# Patient Record
Sex: MALE | Race: WHITE | NOT HISPANIC OR LATINO | URBAN - METROPOLITAN AREA
[De-identification: names, ages, dates, MRNs, and addresses within clinical notes are randomized per-mention and may not be internally consistent; named-entity substitution may affect disease eponyms.]

---

## 2017-10-05 ENCOUNTER — EMERGENCY (EMERGENCY)
Facility: HOSPITAL | Age: 72
LOS: 1 days | Discharge: HOME | End: 2017-10-05
Attending: EMERGENCY MEDICINE | Admitting: EMERGENCY MEDICINE
Payer: MEDICARE

## 2017-10-05 ENCOUNTER — INPATIENT (INPATIENT)
Facility: HOSPITAL | Age: 72
LOS: 1 days | Discharge: HOME | DRG: 982 | End: 2017-10-07
Attending: OTOLARYNGOLOGY | Admitting: OTOLARYNGOLOGY
Payer: MEDICARE

## 2017-10-05 VITALS
HEART RATE: 90 BPM | WEIGHT: 190.04 LBS | DIASTOLIC BLOOD PRESSURE: 100 MMHG | HEIGHT: 71 IN | SYSTOLIC BLOOD PRESSURE: 182 MMHG | RESPIRATION RATE: 18 BRPM | TEMPERATURE: 97 F | OXYGEN SATURATION: 97 %

## 2017-10-05 VITALS
SYSTOLIC BLOOD PRESSURE: 149 MMHG | HEART RATE: 84 BPM | RESPIRATION RATE: 18 BRPM | OXYGEN SATURATION: 100 % | TEMPERATURE: 99 F | DIASTOLIC BLOOD PRESSURE: 85 MMHG

## 2017-10-05 VITALS
HEART RATE: 76 BPM | SYSTOLIC BLOOD PRESSURE: 146 MMHG | DIASTOLIC BLOOD PRESSURE: 94 MMHG | TEMPERATURE: 98 F | RESPIRATION RATE: 18 BRPM | OXYGEN SATURATION: 100 %

## 2017-10-05 LAB
ALBUMIN SERPL ELPH-MCNC: 4 G/DL — SIGNIFICANT CHANGE UP (ref 3.3–5)
ALP SERPL-CCNC: 62 U/L — SIGNIFICANT CHANGE UP (ref 40–120)
ALT FLD-CCNC: 18 U/L — SIGNIFICANT CHANGE UP (ref 10–45)
ANION GAP SERPL CALC-SCNC: 15 MMOL/L — SIGNIFICANT CHANGE UP (ref 5–17)
AST SERPL-CCNC: 25 U/L — SIGNIFICANT CHANGE UP (ref 10–40)
BASOPHILS NFR BLD AUTO: 0.1 % — SIGNIFICANT CHANGE UP (ref 0–2)
BILIRUB SERPL-MCNC: 1.3 MG/DL — HIGH (ref 0.2–1.2)
BUN SERPL-MCNC: 16 MG/DL — SIGNIFICANT CHANGE UP (ref 7–23)
CALCIUM SERPL-MCNC: 9.6 MG/DL — SIGNIFICANT CHANGE UP (ref 8.4–10.5)
CHLORIDE SERPL-SCNC: 102 MMOL/L — SIGNIFICANT CHANGE UP (ref 96–108)
CO2 SERPL-SCNC: 23 MMOL/L — SIGNIFICANT CHANGE UP (ref 22–31)
CREAT SERPL-MCNC: 1.01 MG/DL — SIGNIFICANT CHANGE UP (ref 0.5–1.3)
EOSINOPHIL NFR BLD AUTO: 0.4 % — SIGNIFICANT CHANGE UP (ref 0–6)
GLUCOSE SERPL-MCNC: 135 MG/DL — HIGH (ref 70–99)
HCT VFR BLD CALC: 42 % — SIGNIFICANT CHANGE UP (ref 39–50)
HGB BLD-MCNC: 14.8 G/DL — SIGNIFICANT CHANGE UP (ref 13–17)
LYMPHOCYTES # BLD AUTO: 18 % — SIGNIFICANT CHANGE UP (ref 13–44)
MCHC RBC-ENTMCNC: 30.4 PG — SIGNIFICANT CHANGE UP (ref 27–34)
MCHC RBC-ENTMCNC: 35.2 G/DL — SIGNIFICANT CHANGE UP (ref 32–36)
MCV RBC AUTO: 86.2 FL — SIGNIFICANT CHANGE UP (ref 80–100)
MONOCYTES NFR BLD AUTO: 6.5 % — SIGNIFICANT CHANGE UP (ref 2–14)
NEUTROPHILS NFR BLD AUTO: 75 % — SIGNIFICANT CHANGE UP (ref 43–77)
PLATELET # BLD AUTO: 207 K/UL — SIGNIFICANT CHANGE UP (ref 150–400)
POTASSIUM SERPL-MCNC: 4.2 MMOL/L — SIGNIFICANT CHANGE UP (ref 3.5–5.3)
POTASSIUM SERPL-SCNC: 4.2 MMOL/L — SIGNIFICANT CHANGE UP (ref 3.5–5.3)
PROT SERPL-MCNC: 7.7 G/DL — SIGNIFICANT CHANGE UP (ref 6–8.3)
RBC # BLD: 4.87 M/UL — SIGNIFICANT CHANGE UP (ref 4.2–5.8)
RBC # FLD: 13.5 % — SIGNIFICANT CHANGE UP (ref 10.3–16.9)
SODIUM SERPL-SCNC: 140 MMOL/L — SIGNIFICANT CHANGE UP (ref 135–145)
WBC # BLD: 10.5 K/UL — SIGNIFICANT CHANGE UP (ref 3.8–10.5)
WBC # FLD AUTO: 10.5 K/UL — SIGNIFICANT CHANGE UP (ref 3.8–10.5)

## 2017-10-05 PROCEDURE — 99284 EMERGENCY DEPT VISIT MOD MDM: CPT | Mod: 25

## 2017-10-05 PROCEDURE — 30903 CONTROL OF NOSEBLEED: CPT | Mod: 50

## 2017-10-05 PROCEDURE — 99285 EMERGENCY DEPT VISIT HI MDM: CPT

## 2017-10-05 RX ORDER — MORPHINE SULFATE 50 MG/1
2 CAPSULE, EXTENDED RELEASE ORAL ONCE
Qty: 0 | Refills: 0 | Status: DISCONTINUED | OUTPATIENT
Start: 2017-10-05 | End: 2017-10-05

## 2017-10-05 RX ORDER — LOSARTAN POTASSIUM 100 MG/1
50 TABLET, FILM COATED ORAL ONCE
Qty: 0 | Refills: 0 | Status: COMPLETED | OUTPATIENT
Start: 2017-10-05 | End: 2017-10-05

## 2017-10-05 RX ORDER — METOPROLOL TARTRATE 50 MG
25 TABLET ORAL ONCE
Qty: 0 | Refills: 0 | Status: COMPLETED | OUTPATIENT
Start: 2017-10-05 | End: 2017-10-05

## 2017-10-05 RX ADMIN — MORPHINE SULFATE 2 MILLIGRAM(S): 50 CAPSULE, EXTENDED RELEASE ORAL at 21:48

## 2017-10-05 RX ADMIN — Medication 0.5 MILLIGRAM(S): at 14:21

## 2017-10-05 RX ADMIN — LOSARTAN POTASSIUM 50 MILLIGRAM(S): 100 TABLET, FILM COATED ORAL at 07:32

## 2017-10-05 RX ADMIN — Medication 25 MILLIGRAM(S): at 07:32

## 2017-10-05 NOTE — ED PROVIDER NOTE - ENMT, MLM
Airway patent, Nasal mucosa clear. Mouth with normal mucosa.  pos epistaxis  - mild trickling of blood noted--Throat has no vesicles, no oropharyngeal exudates and uvula is midline.

## 2017-10-05 NOTE — ED ADULT NURSE NOTE - CHPI ED SYMPTOMS NEG
no chills/no blurred vision/no weakness/no numbness/no change in level of consciousness/no vomiting/no loss of consciousness/no syncope/no fever/no nausea

## 2017-10-05 NOTE — ED ADULT NURSE REASSESSMENT NOTE - NS ED NURSE REASSESS COMMENT FT1
Received pt from Night CHARITO Cochran, patient stable, in NAD, ENT at bedside.  Will continue with plan of care.

## 2017-10-05 NOTE — ED PROVIDER NOTE - MEDICAL DECISION MAKING DETAILS
70 y/o m epistaxis right nostril; ENT called after blood in posterior pharynx and oozing post packing in ED, bleeding controlled, pt stable for d/c to f/u with ENT

## 2017-10-05 NOTE — ED ADULT NURSE NOTE - OBJECTIVE STATEMENT
71 yr old male presents to the ed with c.o intermittent epistaxis since yesterday morning. states he has hx of epistatxis since 2008. denies any headache, pain, injury, difficulty in breathing, fever/chills or any other medical complaints. pt tx by anthony Rueda in triage area, packing placed in nostrils.

## 2017-10-05 NOTE — ED ADULT TRIAGE NOTE - CHIEF COMPLAINT QUOTE
nose bleeding since yesterday three times  hx of previous epistaxis in 2008, HTN. pt takes aspirin 81 mg, denies H/A, dizziness, no N/V

## 2017-10-05 NOTE — CONSULT NOTE ADULT - SUBJECTIVE AND OBJECTIVE BOX
HPI: 71y male with h/o HTN/HLD and prior epistaxis presenting with intermittent epistaxis x 1 day. He reports a history of frequent epistaxis many years ago for which he was cauterized in the office on multiple occasions resulting in some nasal deformity. He underwent skin grafting of the septum at [facility] in 2001 after which he did well. He then had an episode of severe epistaxis in 2008 and underwent embolization. He has not had any episodes since that time. Yesterday morning he was in [place] and had a mild nosebleed in the morning. He applied ice and held pressure with resolution. He flew home yesterday, used Afrin prior to getting on the plane and had no issues. Then woke up around 3am with significant bleeding and presented to the ER. On arrival BP was 190/100, came down to 150s with medication. Denies trouble breathing, not coughing or swallowing blood, spitting up some clots. Surgicel placed in bilateral nasal cavities by ER prior to consultation.     Allergies    Vibramycin (Hives)    MEDICATIONS:  Antiinfectives: none      Hematologic/Anticoagulation: Aspirin 81mg      Pain medications/Neuro: none      IV fluids: none      Endocrine Medications: none      All other standing medications:       All other PRN medications:      Vital Signs Last 24 Hrs  T(C): 37 (05 Oct 2017 13:29), Max: 37 (05 Oct 2017 13:29)  T(F): 98.6 (05 Oct 2017 13:29), Max: 98.6 (05 Oct 2017 13:29)  HR: 84 (05 Oct 2017 13:29) (73 - 90)  BP: 149/85 (05 Oct 2017 13:29) (135/81 - 182/100)  BP(mean): --  RR: 18 (05 Oct 2017 13:29) (17 - 18)  SpO2: 100% (05 Oct 2017 13:29) (96% - 100%)    LABS:  CBC-    10-05    140  |  102  |  16  ----------------------------<  135<H>  4.2   |  23  |  1.01    Ca    9.6      05 Oct 2017 07:29    TPro  7.7  /  Alb  4.0  /  TBili  1.3<H>  /  DBili  x   /  AST  25  /  ALT  18  /  AlkPhos  62  10-05    Coagulation Studies-  PT/INR - ( 05 Oct 2017 07:47 )   PT: 12.0 sec;   INR: 1.08          PTT - ( 05 Oct 2017 07:47 )  PTT:30.3 sec  Endocrine Panel-  --  --  9.6 mg/dL        PHYSICAL EXAM:    ENT EXAM-   NAD, AAOx3  Breathing comfortably on room air   Surgicel packing removed from bilateral nasal cavities   Large clot suctioned from nasopharynx   Afrin soaked pledgets placed in the nose  No active bleeding seen     On repeat exam patient with active bleeding from R side  Scope exam showed source of bleeding from posterior middle turbinate on the right, cauterized with silver nitrate   Fibrillar placed between middle turbinate and septum and along floor of nasal cavity    Upon leaving the hospital mild dripping noted from the nose.   Re-examined, no active bleeding, some drops of residual blood coming out on tissue   Surgiflo placed in bilateral nasal cavities   No further bleeding        Assessment/Plan:  71y Male with epistaxis s/p cauterization of R posterior middle turbinate and placement of absorbable packing and surgiflo.   - Instructed to avoid nose blowing, bearing down, or any strenuous activity   - If bleeding at home spray afrin and hold pressure, if doesn't stop come back to ER   - Follow up on Tuesday with Dr. Louis, contact information given to patient   - Sleep with humidifier, apply bacitracin to inside of nares twice daily   - Discussed with attending Dr. Isael Reeves ENT at [phone] with any questions/concerns.

## 2017-10-05 NOTE — ED PROVIDER NOTE - PROGRESS NOTE DETAILS
Pt seen by ent and r nare cauterized with good hemostasis, no further bleeding. Pt cleared for dc to home w/ outpt f/u. Pt requesting med for anxiety- will give low dose ativan and pt to f/u with his pcp/ psych walk in clinic at [facility] for re-evaluation.

## 2017-10-05 NOTE — ED PROVIDER NOTE - ATTENDING CONTRIBUTION TO CARE
Pt is a 72yo m, h/o htn, skin graft to r nare 2/2 epistaxis in '00, embolization to r nare in '08, who p/w intermittent atraumatic r sided epistaxis starting last night, now with bleeding from b/l nares r > l, not relieved w/ ice. + blood to back of throat. No cp, sob. Pt is not on ac. Afebrile. Hypertensive on arrival, improved with oral bp meds in ed. Packing w/ surgicel placed to b/l nares by PA. WNWD m in nad. + slight ooze from nares despite packing. + blood to posterior pharynx. + s1, s2, rrr. Lungs cta b/l. Labs reviewed w/ findings of stable hg/hct and plts. Nasal tong applied for compression. ENT consult requested for evaluation for possible posterior epistaxis.

## 2017-10-05 NOTE — ED ADULT TRIAGE NOTE - CHIEF COMPLAINT QUOTE
Patient status post repeating nose bleed.  Was here earlier for same symptoms. Denies difficulty breathing.

## 2017-10-06 LAB
HCT VFR BLD CALC: 37.2 % — LOW (ref 39–50)
HGB BLD-MCNC: 12.8 G/DL — LOW (ref 13–17)
MCHC RBC-ENTMCNC: 29.8 PG — SIGNIFICANT CHANGE UP (ref 27–34)
MCHC RBC-ENTMCNC: 34.4 G/DL — SIGNIFICANT CHANGE UP (ref 32–36)
MCV RBC AUTO: 86.7 FL — SIGNIFICANT CHANGE UP (ref 80–100)
PLATELET # BLD AUTO: 205 K/UL — SIGNIFICANT CHANGE UP (ref 150–400)
RBC # BLD: 4.29 M/UL — SIGNIFICANT CHANGE UP (ref 4.2–5.8)
RBC # FLD: 13.5 % — SIGNIFICANT CHANGE UP (ref 10.3–16.9)
WBC # BLD: 13.4 K/UL — HIGH (ref 3.8–10.5)
WBC # FLD AUTO: 13.4 K/UL — HIGH (ref 3.8–10.5)

## 2017-10-06 PROCEDURE — 75894 X-RAYS TRANSCATH THERAPY: CPT | Mod: 26

## 2017-10-06 PROCEDURE — 61626 TCAT PERM OCCLS/EMBOL NONCNS: CPT

## 2017-10-06 PROCEDURE — 36227 PLACE CATH XTRNL CAROTID: CPT | Mod: RT

## 2017-10-06 PROCEDURE — 36223 PLACE CATH CAROTID/INOM ART: CPT | Mod: RT

## 2017-10-06 PROCEDURE — 75898 FOLLOW-UP ANGIOGRAPHY: CPT | Mod: 26

## 2017-10-06 RX ORDER — METOPROLOL TARTRATE 50 MG
25 TABLET ORAL DAILY
Qty: 0 | Refills: 0 | Status: DISCONTINUED | OUTPATIENT
Start: 2017-10-06 | End: 2017-10-07

## 2017-10-06 RX ORDER — LABETALOL HCL 100 MG
100 TABLET ORAL EVERY 4 HOURS
Qty: 0 | Refills: 0 | Status: DISCONTINUED | OUTPATIENT
Start: 2017-10-06 | End: 2017-10-07

## 2017-10-06 RX ORDER — ONDANSETRON 8 MG/1
4 TABLET, FILM COATED ORAL EVERY 6 HOURS
Qty: 0 | Refills: 0 | Status: DISCONTINUED | OUTPATIENT
Start: 2017-10-06 | End: 2017-10-07

## 2017-10-06 RX ORDER — CEFAZOLIN SODIUM 1 G
1000 VIAL (EA) INJECTION ONCE
Qty: 0 | Refills: 0 | Status: COMPLETED | OUTPATIENT
Start: 2017-10-06 | End: 2017-10-06

## 2017-10-06 RX ORDER — INFLUENZA VIRUS VACCINE 15; 15; 15; 15 UG/.5ML; UG/.5ML; UG/.5ML; UG/.5ML
0.5 SUSPENSION INTRAMUSCULAR ONCE
Qty: 0 | Refills: 0 | Status: DISCONTINUED | OUTPATIENT
Start: 2017-10-06 | End: 2017-10-06

## 2017-10-06 RX ORDER — CEFAZOLIN SODIUM 1 G
VIAL (EA) INJECTION
Qty: 0 | Refills: 0 | Status: DISCONTINUED | OUTPATIENT
Start: 2017-10-06 | End: 2017-10-07

## 2017-10-06 RX ORDER — CEFAZOLIN SODIUM 1 G
1000 VIAL (EA) INJECTION EVERY 8 HOURS
Qty: 0 | Refills: 0 | Status: DISCONTINUED | OUTPATIENT
Start: 2017-10-06 | End: 2017-10-07

## 2017-10-06 RX ORDER — OXYCODONE AND ACETAMINOPHEN 5; 325 MG/1; MG/1
1 TABLET ORAL EVERY 6 HOURS
Qty: 0 | Refills: 0 | Status: DISCONTINUED | OUTPATIENT
Start: 2017-10-06 | End: 2017-10-07

## 2017-10-06 RX ORDER — ACETAMINOPHEN 500 MG
650 TABLET ORAL EVERY 6 HOURS
Qty: 0 | Refills: 0 | Status: DISCONTINUED | OUTPATIENT
Start: 2017-10-06 | End: 2017-10-07

## 2017-10-06 RX ORDER — DEXTROSE MONOHYDRATE, SODIUM CHLORIDE, AND POTASSIUM CHLORIDE 50; .745; 4.5 G/1000ML; G/1000ML; G/1000ML
1000 INJECTION, SOLUTION INTRAVENOUS
Qty: 0 | Refills: 0 | Status: DISCONTINUED | OUTPATIENT
Start: 2017-10-06 | End: 2017-10-06

## 2017-10-06 RX ORDER — SODIUM CHLORIDE 9 MG/ML
1000 INJECTION INTRAMUSCULAR; INTRAVENOUS; SUBCUTANEOUS
Qty: 0 | Refills: 0 | Status: DISCONTINUED | OUTPATIENT
Start: 2017-10-06 | End: 2017-10-07

## 2017-10-06 RX ORDER — LOSARTAN POTASSIUM 100 MG/1
50 TABLET, FILM COATED ORAL DAILY
Qty: 0 | Refills: 0 | Status: DISCONTINUED | OUTPATIENT
Start: 2017-10-06 | End: 2017-10-07

## 2017-10-06 RX ORDER — INFLUENZA VIRUS VACCINE 15; 15; 15; 15 UG/.5ML; UG/.5ML; UG/.5ML; UG/.5ML
0.5 SUSPENSION INTRAMUSCULAR ONCE
Qty: 0 | Refills: 0 | Status: DISCONTINUED | OUTPATIENT
Start: 2017-10-06 | End: 2017-10-07

## 2017-10-06 RX ORDER — MORPHINE SULFATE 50 MG/1
2 CAPSULE, EXTENDED RELEASE ORAL EVERY 4 HOURS
Qty: 0 | Refills: 0 | Status: DISCONTINUED | OUTPATIENT
Start: 2017-10-06 | End: 2017-10-07

## 2017-10-06 RX ADMIN — OXYCODONE AND ACETAMINOPHEN 1 TABLET(S): 5; 325 TABLET ORAL at 02:00

## 2017-10-06 RX ADMIN — DEXTROSE MONOHYDRATE, SODIUM CHLORIDE, AND POTASSIUM CHLORIDE 75 MILLILITER(S): 50; .745; 4.5 INJECTION, SOLUTION INTRAVENOUS at 01:09

## 2017-10-06 RX ADMIN — Medication 650 MILLIGRAM(S): at 04:01

## 2017-10-06 RX ADMIN — Medication 1 MILLIGRAM(S): at 23:00

## 2017-10-06 RX ADMIN — Medication 25 MILLIGRAM(S): at 06:41

## 2017-10-06 RX ADMIN — LOSARTAN POTASSIUM 50 MILLIGRAM(S): 100 TABLET, FILM COATED ORAL at 06:41

## 2017-10-06 RX ADMIN — Medication 0.5 MILLIGRAM(S): at 11:23

## 2017-10-06 RX ADMIN — OXYCODONE AND ACETAMINOPHEN 1 TABLET(S): 5; 325 TABLET ORAL at 01:09

## 2017-10-06 RX ADMIN — Medication 100 MILLIGRAM(S): at 12:07

## 2017-10-06 RX ADMIN — DEXTROSE MONOHYDRATE, SODIUM CHLORIDE, AND POTASSIUM CHLORIDE 75 MILLILITER(S): 50; .745; 4.5 INJECTION, SOLUTION INTRAVENOUS at 22:21

## 2017-10-06 NOTE — H&P ADULT - ASSESSMENT
71M with right-sided epistaxis refractory to cautery in ED requiring nasal packing. Given several repeat bleeds and poor accibility to area of bleeding without operative intervention, will observe in hospital for further bleeding and also control BP.   -NPO w/ meds  -BP control  -IV fluids  -will restart home BP meds in AM  -nasal precautions

## 2017-10-06 NOTE — H&P ADULT - HISTORY OF PRESENT ILLNESS
HPI: 71y male with h/o HTN/HLD and prior epistaxis presenting with intermittent epistaxis x 1 day. He reports a history of frequent epistaxis many years ago for which he was cauterized in the office on multiple occasions resulting in some nasal deformity. He underwent skin grafting of the septum at [facility] in 2001 after which he did well. He then had an episode of severe epistaxis in 2008 and underwent embolization. He has not had any episodes since that time. Yesterday morning he was in [place] and had a mild nosebleed in the morning. He applied ice and held pressure with resolution. He flew home yesterday, used Afrin prior to getting on the plane and had no issues. Then woke up around 3am with significant bleeding and presented to the ER. On arrival BP was 190/100, came down to 150s with medication. Denies trouble breathing, not coughing or swallowing blood, spitting up some clots. Surgicel placed in bilateral nasal cavities by ER prior to consultation. The patient was cauterized twice in the ED and discharged early afternoon on 10/5. Pt returned with repeat bleed at 8pm.     Exam:  Constant moderate bleeding from right nasal cavity.  Right: small caudal septal perforation superiorly. Area of previous skin graft evident on septum. Area of bleeding on septum directly adjacent to middle turbinate. Area not directly visualized 2/2 mild septal deflection. Area packed with gelfoam and small merocel.  Left: no active bleeding. Old clot suctioned.   OC/OP: several large clots suctioned. after packing placed, no active bleeding in OP.

## 2017-10-06 NOTE — BRIEF OPERATIVE NOTE - PROCEDURE
<<-----Click on this checkbox to enter Procedure Cerebral angiography  10/06/2017  Cerebral angiography with embolization of right sphenopallatine artery and glue of right infraorbital artery.  [facility]

## 2017-10-06 NOTE — PATIENT PROFILE ADULT. - ABILITY TO HEAR (WITH HEARING AID OR HEARING APPLIANCE IF NORMALLY USED):
Mildly to Moderately Impaired: difficulty hearing in some environments or speaker may need to increase volume or speak distinctly/Hard of hearing

## 2017-10-06 NOTE — CONSULT NOTE ADULT - SUBJECTIVE AND OBJECTIVE BOX
NP Note Neuro Endovascualr Dr Jorgensen        Consult  Note     HPI: 71y male with h/o HTN/HLD and prior epistaxis presenting with intermittent epistaxis x 1 day. He reports a history of frequent epistaxis many years ago for which he was cauterized in the office on multiple occasions resulting in some nasal deformity. He underwent skin grafting of the septum at [facility] in 2001 after which he did well. He then had an episode of severe epistaxis in 2008 and underwent embolization. He has not had any episodes since that time. Yesterday morning he was in [place] and had a mild nosebleed in the morning. He applied ice and held pressure with resolution. He flew home yesterday, used Afrin prior to getting on the plane and had no issues. Then woke up around 3am with significant bleeding and presented to the ER. On arrival BP was 190/100, came down to 150s with medication. Denies trouble breathing, not coughing or swallowing blood, spitting up some clots. Surgicel placed in bilateral nasal cavities by ER prior to consultation. The patient was cauterized twice in the ED and discharged early afternoon on 10/5. Pt returned with repeat bleed at 8pm.   Allergies and Intolerances:        Allergies:  	Vibramycin: Drug, Hives  Past Medical History:  Hyperlipidemia, unspecified hyperlipidemia type    Hypertension, unspecified type.      VSS    MEDICATIONS  (STANDING):  ceFAZolin   IVPB      ceFAZolin   IVPB 1000 milliGRAM(s) IV Intermittent once  ceFAZolin   IVPB 1000 milliGRAM(s) IV Intermittent every 8 hours  dextrose 5% + sodium chloride 0.45% with potassium chloride 20 mEq/L 1000 milliLiter(s) (75 mL/Hr) IV Continuous <Continuous>  influenza  Vaccine (HIGH DOSE) 0.5 milliLiter(s) IntraMuscular once  losartan 50 milliGRAM(s) Oral daily  metoprolol succinate ER 25 milliGRAM(s) Oral daily    MEDICATIONS  (PRN):  acetaminophen   Tablet. 650 milliGRAM(s) Oral every 6 hours PRN Mild Pain (1 - 3)  labetalol 100 milliGRAM(s) Oral every 4 hours PRN SBP>150  morphine  - Injectable 2 milliGRAM(s) IV Push every 4 hours PRN Severe Pain (7 - 10)  ondansetron Injectable 4 milliGRAM(s) IV Push every 6 hours PRN Nausea and/or Vomiting  oxyCODONE    5 mG/acetaminophen 325 mG 1 Tablet(s) Oral every 6 hours PRN Moderate Pain (4 - 6)    Activated Partial Thromboplastin Time (10.05.17 @ 07:47)    Activated Partial Thromboplastin Time: 30.3:   Prothrombin Time, Plasma: 12.0: Effective March 21st, the reference range for PT has changed. sec (10.05.17 @ 07:47)    Prothrombin Time and INR, Plasma (10.05.17 @ 07:47)      INR: 1.08:   Comprehensive Metabolic Panel (10.05.17 @ 07:29)    Sodium, Serum: 140 mmol/L    Potassium, Serum: 4.2 mmol/L    Chloride, Serum: 102 mmol/L    Carbon Dioxide, Serum: 23 mmol/L    Anion Gap, Serum: 15 mmol/L    Blood Urea Nitrogen, Serum: 16 mg/dL    Creatinine, Serum: 1.01 mg/dL    Glucose, Serum: 135 mg/dL    Calcium, Total Serum: 9.6 mg/dL    Protein Total, Serum: 7.7 g/dL    Albumin, Serum: 4.0 g/dL    Bilirubin Total, Serum: 1.3 mg/dL    Alkaline Phosphatase, Serum: 62 U/L    Aspartate Aminotransferase (AST/SGOT): 25 U/L    Alanine Aminotransferase (ALT/SGPT): 18 U/L    eGFR if Non : 75: The units for eGFR are ml/min/1.73m2 (normalized body surface area). The  eGFR is calculated from a serum creatinine using the CKD-EPI equation.  Other variables required for calculation are race, age and sex. Among  patients with chronic kidney disease (CKD), the eGFR is useful in  determining the stage of disease according to KDOQI CKD classification.  All eGFR results are reported numerically with the following  interpretation.          GFR                    With                        Without     (ml/min/1.73 m2)    Kidney Damage       Kidney Damage        >= 90                    Stage 1                     Normal        60-89                    Stage 2                     Decreased GFR        30-59                    Stage 3         Stage 3        15-29                    Stage 4                     Stage 4        < 15                      Stage 5                     Stage 5  Each stage of CKD assumes that the associated GFR level has been in  effect for at least 3 months. Determination of stages one and two (with  eGFR > 59 ml/min/m2) requires estimation of kidney damage for at least 3  months as defined by structural or functional abnormalities.  Limitations: All estimates of GFR will be less accurate for patientsat  extremes of muscle mass (including but not limited to frail elderly,  critically ill, or cancer patients), those with unusual diets, and those  with conditions associated with reduced secretion or extrarenal  elimination of creatinine. The eGFR equation is not recommended for use  in patients with unstable creatinine levels. mL/min/1.73M2    eGFR if : 87: The units for eGFR are ml/min/1.73m2 (normalized body surface area). The  eGFR is calculated from a serum creatinine using the CKD-EPI equation.  Other variables required for calculation are race, age and sex. Among  patients with chronic kidney disease (CKD), the eGFR is useful in  determining the stage of disease according to KDOQI CKD classification.  All eGFR results are reported numerically with the following  interpretation.          GFR                    With                        Without     (ml/min/1.73 m2)    Kidney Damage       Kidney Damage        >= 90                    Stage 1                     Normal        60-89                    Stage 2                     Decreased GFR        30-59                    Stage 3         Stage 3        15-29                    Stage 4                     Stage 4        < 15                      Stage 5                     Stage 5  Complete Blood Count + Automated Diff (10.05.17 @ 07:29)    WBC Count: 10.5 K/uL    RBC Count: 4.87 M/uL    Hemoglobin: 14.8 g/dL    Hematocrit: 42.0 %    Mean Cell Volume: 86.2 fL    Mean Cell Hemoglobin: 30.4 pg    Mean Cell Hemoglobin Conc: 35.2 g/dL    Red Cell Distrib Width: 13.5 %    Platelet Count - Automated: 207 K/uL       ROS:  CV: RRR  Pulm: Lungs CTAB O2 Sats 100% on Room air  PV:+ peripheal pulsebilaterally warm to touch + Capillary refill  GI: Abdomen round soft +BS NPO  : Voiding without difficulty  Skin warm and ddry  R nasal packing in palce small amounts blood staining from nare  Neuro: A&OX3 Cranial nerves intact  LIRA 5/5 without drift or dysmetria            Assessment:  · 		  71M with right-sided epistaxis refractory to cautery in ED requiring nasal packing. Given several repeat bleeds and poor accibility to area of bleeding without operative intervention, will observe in hospital for further bleeding and also control BP.     -NPO w/ meds  -BP control  -IV fluids  -will restart home BP meds in AM  -nasal precautions  -Will discuss for any endovascular intervention with  Dr Jorgensen

## 2017-10-06 NOTE — BRIEF OPERATIVE NOTE - OPERATION/FINDINGS
Right CFA approach for cerebral angiography with embolization of right sphenopallatine artery with embospheres and right infraorbital artery glue embolization. Findings suspicious of small AVM. Right groin perclosed.

## 2017-10-07 ENCOUNTER — TRANSCRIPTION ENCOUNTER (OUTPATIENT)
Age: 72
End: 2017-10-07

## 2017-10-07 VITALS
OXYGEN SATURATION: 98 % | DIASTOLIC BLOOD PRESSURE: 77 MMHG | SYSTOLIC BLOOD PRESSURE: 144 MMHG | TEMPERATURE: 98 F | HEART RATE: 98 BPM | RESPIRATION RATE: 16 BRPM

## 2017-10-07 DIAGNOSIS — R04.0 EPISTAXIS: ICD-10-CM

## 2017-10-07 DIAGNOSIS — D50.0 IRON DEFICIENCY ANEMIA SECONDARY TO BLOOD LOSS (CHRONIC): ICD-10-CM

## 2017-10-07 LAB
ANION GAP SERPL CALC-SCNC: 15 MMOL/L — SIGNIFICANT CHANGE UP (ref 5–17)
BASOPHILS NFR BLD AUTO: 0 % — SIGNIFICANT CHANGE UP (ref 0–2)
BUN SERPL-MCNC: 18 MG/DL — SIGNIFICANT CHANGE UP (ref 7–23)
CALCIUM SERPL-MCNC: 9.3 MG/DL — SIGNIFICANT CHANGE UP (ref 8.4–10.5)
CHLORIDE SERPL-SCNC: 102 MMOL/L — SIGNIFICANT CHANGE UP (ref 96–108)
CO2 SERPL-SCNC: 21 MMOL/L — LOW (ref 22–31)
CREAT SERPL-MCNC: 0.87 MG/DL — SIGNIFICANT CHANGE UP (ref 0.5–1.3)
EOSINOPHIL NFR BLD AUTO: 0 % — SIGNIFICANT CHANGE UP (ref 0–6)
GLUCOSE SERPL-MCNC: 185 MG/DL — HIGH (ref 70–99)
HCT VFR BLD CALC: 36.1 % — LOW (ref 39–50)
HGB BLD-MCNC: 12.6 G/DL — LOW (ref 13–17)
LYMPHOCYTES # BLD AUTO: 11.3 % — LOW (ref 13–44)
MCHC RBC-ENTMCNC: 30 PG — SIGNIFICANT CHANGE UP (ref 27–34)
MCHC RBC-ENTMCNC: 34.9 G/DL — SIGNIFICANT CHANGE UP (ref 32–36)
MCV RBC AUTO: 86 FL — SIGNIFICANT CHANGE UP (ref 80–100)
MONOCYTES NFR BLD AUTO: 4.2 % — SIGNIFICANT CHANGE UP (ref 2–14)
NEUTROPHILS NFR BLD AUTO: 84.5 % — HIGH (ref 43–77)
PLATELET # BLD AUTO: 198 K/UL — SIGNIFICANT CHANGE UP (ref 150–400)
POTASSIUM SERPL-MCNC: 4.4 MMOL/L — SIGNIFICANT CHANGE UP (ref 3.5–5.3)
POTASSIUM SERPL-SCNC: 4.4 MMOL/L — SIGNIFICANT CHANGE UP (ref 3.5–5.3)
RBC # BLD: 4.2 M/UL — SIGNIFICANT CHANGE UP (ref 4.2–5.8)
RBC # FLD: 13.6 % — SIGNIFICANT CHANGE UP (ref 10.3–16.9)
SODIUM SERPL-SCNC: 138 MMOL/L — SIGNIFICANT CHANGE UP (ref 135–145)
WBC # BLD: 8.1 K/UL — SIGNIFICANT CHANGE UP (ref 3.8–10.5)
WBC # FLD AUTO: 8.1 K/UL — SIGNIFICANT CHANGE UP (ref 3.8–10.5)

## 2017-10-07 PROCEDURE — 99221 1ST HOSP IP/OBS SF/LOW 40: CPT

## 2017-10-07 RX ORDER — DOCUSATE SODIUM 100 MG
1 CAPSULE ORAL
Qty: 28 | Refills: 0
Start: 2017-10-07 | End: 2017-10-21

## 2017-10-07 RX ORDER — SODIUM CHLORIDE 0.65 %
1 AEROSOL, SPRAY (ML) NASAL EVERY 4 HOURS
Qty: 0 | Refills: 0 | Status: DISCONTINUED | OUTPATIENT
Start: 2017-10-07 | End: 2017-10-07

## 2017-10-07 RX ORDER — LOSARTAN POTASSIUM 100 MG/1
50 TABLET, FILM COATED ORAL DAILY
Qty: 0 | Refills: 0 | Status: DISCONTINUED | OUTPATIENT
Start: 2017-10-07 | End: 2017-10-07

## 2017-10-07 RX ORDER — SENNA PLUS 8.6 MG/1
1 TABLET ORAL DAILY
Qty: 0 | Refills: 0 | Status: DISCONTINUED | OUTPATIENT
Start: 2017-10-07 | End: 2017-10-07

## 2017-10-07 RX ORDER — BACITRACIN ZINC 500 UNIT/G
1 OINTMENT IN PACKET (EA) TOPICAL
Qty: 1 | Refills: 3 | OUTPATIENT
Start: 2017-10-07 | End: 2018-02-03

## 2017-10-07 RX ORDER — BACITRACIN ZINC 500 UNIT/G
1 OINTMENT IN PACKET (EA) TOPICAL
Qty: 0 | Refills: 0 | Status: DISCONTINUED | OUTPATIENT
Start: 2017-10-07 | End: 2017-10-07

## 2017-10-07 RX ORDER — DOCUSATE SODIUM 100 MG
100 CAPSULE ORAL
Qty: 0 | Refills: 0 | Status: DISCONTINUED | OUTPATIENT
Start: 2017-10-07 | End: 2017-10-07

## 2017-10-07 RX ADMIN — Medication 1 SPRAY(S): at 12:24

## 2017-10-07 RX ADMIN — Medication 25 MILLIGRAM(S): at 05:30

## 2017-10-07 RX ADMIN — Medication 1 APPLICATION(S): at 12:27

## 2017-10-07 RX ADMIN — OXYCODONE AND ACETAMINOPHEN 1 TABLET(S): 5; 325 TABLET ORAL at 05:37

## 2017-10-07 RX ADMIN — Medication 1 SPRAY(S): at 14:51

## 2017-10-07 RX ADMIN — SENNA PLUS 1 TABLET(S): 8.6 TABLET ORAL at 12:27

## 2017-10-07 RX ADMIN — OXYCODONE AND ACETAMINOPHEN 1 TABLET(S): 5; 325 TABLET ORAL at 06:07

## 2017-10-07 RX ADMIN — Medication 100 MILLIGRAM(S): at 04:30

## 2017-10-07 RX ADMIN — LOSARTAN POTASSIUM 50 MILLIGRAM(S): 100 TABLET, FILM COATED ORAL at 05:30

## 2017-10-07 NOTE — CONSULT NOTE ADULT - ASSESSMENT
Mild Anemia post nose bleed, discussed with pt and wife need to increase Iron in diet, and f/u with me as needed.

## 2017-10-07 NOTE — DISCHARGE NOTE ADULT - HOSPITAL COURSE
HPI: 71y male with h/o HTN/HLD and prior epistaxis presenting with intermittent epistaxis x 1 day. He reports a history of frequent epistaxis many years ago for which he was cauterized in the office on multiple occasions resulting in some nasal deformity. He underwent skin grafting of the septum at [facility] in 2001 after which he did well. He then had an episode of severe epistaxis in 2008 and underwent embolization. He has not had any episodes since that time. Yesterday morning he was in [place] and had a mild nosebleed in the morning. He applied ice and held pressure with resolution. He flew home yesterday, used Afrin prior to getting on the plane and had no issues. Then woke up around 3am with significant bleeding and presented to the ER. On arrival BP was 190/100, came down to 150s with medication. Denies trouble breathing, not coughing or swallowing blood, spitting up some clots. Surgicel placed in bilateral nasal cavities by ER prior to consultation. The patient was cauterized twice in the ED and discharged early afternoon on 10/5. Pt returned with repeat bleed at 8pm.     10/6: Patient with persistent intermittent bleeding from the right side of the nose. Merocel packing removed and replaced with vaseline guaze. Neurosurgery consulted for angiography and embolization which was performed. Right sphenopalatine artery embolized with embospheres and glue placed in right infraorbital artery. Angiography showed findings suspicious for possible AVM.   10/7: Stable overnight following angio/embo. Gauze packing removed with no further episodes of bleeding. H/H stable. Patient went for MRI for further evaluation of possible AVM but was unable to tolerate due to discomfort in the nose and anxiety. Will have MRI as outpatient and follow up with Dr. Jorgensen, plan confirmed with neurosurgery team. The patient was stable for discharge.      Discharge Exam:  NAD, AAOx3  Breathing comfortably on room air  OC/OPx clear, no drainage down posterior wall of oropharynx   Nose with some scabs and crusting bilaterally, no active drainage or bleeding, bacitracin applies to nares

## 2017-10-07 NOTE — PROGRESS NOTE ADULT - SUBJECTIVE AND OBJECTIVE BOX
Neurosurgery Post-Op Check    HPI:  HPI: 71y male with h/o HTN/HLD and prior epistaxis presenting with intermittent epistaxis x 1 day. He reports a history of frequent epistaxis many years ago for which he was cauterized in the office on multiple occasions resulting in some nasal deformity. He underwent skin grafting of the septum at [facility] in 2001 after which he did well. He then had an episode of severe epistaxis in 2008 and underwent embolization. He has not had any episodes since that time. Yesterday morning he was in [place] and had a mild nosebleed in the morning. He applied ice and held pressure with resolution. He flew home yesterday, used Afrin prior to getting on the plane and had no issues. Then woke up around 3am with significant bleeding and presented to the ER. On arrival BP was 190/100, came down to 150s with medication. Denies trouble breathing, not coughing or swallowing blood, spitting up some clots. Surgicel placed in bilateral nasal cavities by ER prior to consultation. The patient was cauterized twice in the ED and discharged early afternoon on 10/5. Pt returned with repeat bleed at 8pm.       Sub: Patient without significant complaints of pain, only discomfort with britt catheter. Interested in PO diet.      Vital Signs Last 24 Hrs  T(C): 37 (06 Oct 2017 23:48), Max: 37 (06 Oct 2017 23:48)  T(F): 98.6 (06 Oct 2017 23:48), Max: 98.6 (06 Oct 2017 23:48)  HR: 98 (06 Oct 2017 23:48) (75 - 110)  BP: 115/73 (06 Oct 2017 23:48) (115/73 - 156/96)  BP(mean): --  RR: 16 (06 Oct 2017 23:26) (16 - 18)  SpO2: 97% (06 Oct 2017 23:26) (95% - 99%)    I&O's Summary    05 Oct 2017 07:01  -  06 Oct 2017 07:00  --------------------------------------------------------  IN: 525 mL / OUT: 600 mL / NET: -75 mL    06 Oct 2017 07:01  -  07 Oct 2017 00:37  --------------------------------------------------------  IN: 450 mL / OUT: 475 mL / NET: -25 mL        PHYSICAL EXAM:  NAD, AAOx3  PERRL. EOMI. VF grossly intact.  Neck FROM, nontender  Right groin C/D/I w/ dressing. Pulses 2+ throughout  CNs II-XII intact. 5/5 str x4 extremities. Sensation to LT intact. Following commands.    TUBES/LINES:  [] Britt  [] Lumbar Drain  [] Wound Drains  [] Others      DIET:  [] NPO  [] Mechanical  [] Tube feeds    LABS:                        12.8   13.4  )-----------( 205      ( 06 Oct 2017 17:29 )             37.2     10-05    140  |  102  |  16  ----------------------------<  135<H>  4.2   |  23  |  1.01    Ca    9.6      05 Oct 2017 07:29    TPro  7.7  /  Alb  4.0  /  TBili  1.3<H>  /  DBili  x   /  AST  25  /  ALT  18  /  AlkPhos  62  10-05    PT/INR - ( 05 Oct 2017 07:47 )   PT: 12.0 sec;   INR: 1.08          PTT - ( 05 Oct 2017 07:47 )  PTT:30.3 sec        CAPILLARY BLOOD GLUCOSE          Drug Levels: [] N/A    CSF Analysis: [] N/A      Allergies    Vibramycin (Hives)    Intolerances      MEDICATIONS:  Antibiotics:  ceFAZolin   IVPB      ceFAZolin   IVPB 1000 milliGRAM(s) IV Intermittent every 8 hours    Neuro:  acetaminophen   Tablet. 650 milliGRAM(s) Oral every 6 hours PRN  LORazepam     Tablet 0.5 milliGRAM(s) Oral every 6 hours PRN  LORazepam   Injectable 1 milliGRAM(s) IV Push once PRN  morphine  - Injectable 2 milliGRAM(s) IV Push every 4 hours PRN  ondansetron Injectable 4 milliGRAM(s) IV Push every 6 hours PRN  oxyCODONE    5 mG/acetaminophen 325 mG 1 Tablet(s) Oral every 6 hours PRN    Anticoagulation:    OTHER:  influenza  Vaccine (HIGH DOSE) 0.5 milliLiter(s) IntraMuscular once  labetalol 100 milliGRAM(s) Oral every 4 hours PRN  losartan 50 milliGRAM(s) Oral daily  metoprolol succinate ER 25 milliGRAM(s) Oral daily    IVF:  sodium chloride 0.9%. 1000 milliLiter(s) IV Continuous <Continuous>    CULTURES:    RADIOLOGY & ADDITIONAL TESTS:      ASSESSMENT:  71y Male with Epistaxis now s/p cerebral angiogram with embolization of right sphenopallatine and infarorbital arteries      -Standard post-angio orders - bedrest x4 hours, neuro/vascular checks,  -D/C britt due to patient discomfort,  -PO as tolerated, continue IVF,  -Monitor for visual changes, especially in right eye,  -MRI Brain w/ and w/o Gadolinium to r/o AVM,  -ENT for discharge planning, clear from Dr. Jorgensen on POD#1.

## 2017-10-07 NOTE — DISCHARGE NOTE ADULT - PLAN OF CARE
Resolution of epistaxis - You may resume a regular diet.   - No strenuous activity or lifting > 15 pounds for 2 weeks. Do not blow your nose.   - Use nasal saline spray 4x daily. Apply bacitracin to the front of both nostrils twice daily, do not place anything inside the nose. Sleep with a humidifier.   - If you experience bleeding from the nose you can spray Afrin nasal spray and hold pressure. If the bleeding does not resolve call your doctor or come to the ER.   - Arrange for an MRI as an outpatient with Dr. Jorgensen's office, schedule follow up in 2 weeks.   - Follow up with Dr. Louis in the office on Tuesday.

## 2017-10-07 NOTE — DISCHARGE NOTE ADULT - CARE PROVIDER_API CALL
Jonny Louis), Otolaryngology  [address]  [address] [place] [address]  Phone: [phone]  Fax: [phone]    Reynaldo Jorgensen (MD), Neurology; Vascular Neurology  [address] [address]  Phone: [phone]  Fax: [phone]

## 2017-10-07 NOTE — DISCHARGE NOTE ADULT - PATIENT PORTAL LINK FT
“You can access the [facility] Patient Portal, offered by [facility], by registering with the following website: [email or web address]”

## 2017-10-07 NOTE — DISCHARGE NOTE ADULT - CARE PLAN
Principal Discharge DX:	Epistaxis  Goal:	Resolution of epistaxis  Instructions for follow-up, activity and diet:	- You may resume a regular diet.   - No strenuous activity or lifting > 15 pounds for 2 weeks. Do not blow your nose.   - Use nasal saline spray 4x daily. Apply bacitracin to the front of both nostrils twice daily, do not place anything inside the nose. Sleep with a humidifier.   - If you experience bleeding from the nose you can spray Afrin nasal spray and hold pressure. If the bleeding does not resolve call your doctor or come to the ER.   - Arrange for an MRI as an outpatient with Dr. Jorgensen's office, schedule follow up in 2 weeks.   - Follow up with Dr. Louis in the office on Tuesday.

## 2017-10-07 NOTE — PROGRESS NOTE ADULT - SUBJECTIVE AND OBJECTIVE BOX
Subjective: Seen/evaluated at bedside.  Doing well.  No issues overnight.  Refusing MRI    T(C): 36.8 (10-07-17 @ 05:42), Max: 37 (10-06-17 @ 23:48)  HR: 92 (10-07-17 @ 05:42) (79 - 110)  BP: 126/79 (10-07-17 @ 05:42) (115/73 - 142/83)  RR: 15 (10-07-17 @ 05:42) (15 - 16)  SpO2: 98% (10-07-17 @ 05:42) (95% - 99%)  Wt(kg): --    Exam: A/Ox3, FC, speech clear  LIRA 5/5 strength  Sensation intact light touch all dermatomes  Face symmetric    CBC Full  -  ( 07 Oct 2017 07:45 )  WBC Count : 8.1 K/uL  Hemoglobin : 12.6 g/dL  Hematocrit : 36.1 %  Platelet Count - Automated : 198 K/uL  Mean Cell Volume : 86.0 fL  Mean Cell Hemoglobin : 30.0 pg  Mean Cell Hemoglobin Concentration : 34.9 g/dL  Auto Neutrophil # : x  Auto Lymphocyte # : x  Auto Monocyte # : x  Auto Eosinophil # : x  Auto Basophil # : x  Auto Neutrophil % : 84.5 %  Auto Lymphocyte % : 11.3 %  Auto Monocyte % : 4.2 %  Auto Eosinophil % : 0.0 %  Auto Basophil % : 0.0 %    10-07    138  |  102  |  18  ----------------------------<  185<H>  4.4   |  21<L>  |  0.87    Ca    9.3      07 Oct 2017 07:45            Wound: groin C/D/I, no hematoma    Imaging:    Assessment/Plan:  -PT/OOB  -May have MRI as outpatient  -Dispo planning  -D/W Dr. Jorgensen

## 2017-10-07 NOTE — DISCHARGE NOTE ADULT - CARE PROVIDERS DIRECT ADDRESSES
,[email or web address]

## 2017-10-07 NOTE — DISCHARGE NOTE ADULT - MEDICATION SUMMARY - MEDICATIONS TO TAKE
I will START or STAY ON the medications listed below when I get home from the hospital:    Cozaar 50 mg oral tablet  -- 1 tab(s) by mouth once a day  -- Indication: For Hypertension, unspecified type    LORazepam 0.5 mg oral tablet  -- 1 tab(s) by mouth every 6 hours, As needed, Anxiety  -- Indication: For Epistaxis    Toprol-XL 25 mg oral tablet, extended release  -- 1 tab(s) by mouth once a day  -- Indication: For Hypertension, unspecified type    bacitracin 500 units/g topical ointment  -- Apply on skin to bilateral nares twice daily   -- For external use only.    -- Indication: For EIPSTAXIS    Colace 50 mg oral capsule  -- 1 cap(s) by mouth 2 times a day   -- Medication should be taken with plenty of water.    -- Indication: For EIPSTAXIS

## 2017-10-07 NOTE — CONSULT NOTE ADULT - SUBJECTIVE AND OBJECTIVE BOX
HPI:  HPI: 71y male with h/o HTN/HLD and prior epistaxis presenting with intermittent epistaxis x 1 day. He reports a history of frequent epistaxis many years ago for which he was cauterized in the office on multiple occasions resulting in some nasal deformity. He underwent skin grafting of the septum at [facility] in 2001 after which he did well. He then had an episode of severe epistaxis in 2008 and underwent embolization. He has not had any episodes since that time. Yesterday morning he was in [place] and had a mild nosebleed in the morning. He applied ice and held pressure with resolution. He flew home yesterday, used Afrin prior to getting on the plane and had no issues. Then woke up around 3am with significant bleeding and presented to the ER. On arrival BP was 190/100, came down to 150s with medication. Denies trouble breathing, not coughing or swallowing blood, spitting up some clots. Surgicel placed in bilateral nasal cavities by ER prior to consultation. The patient was cauterized twice in the ED and discharged early afternoon on 10/5. Pt returned with repeat bleed at 8pm.     Exam:  Constant moderate bleeding from right nasal cavity.  Right: small caudal septal perforation superiorly. Area of previous skin graft evident on septum. Area of bleeding on septum directly adjacent to middle turbinate. Area not directly visualized 2/2 mild septal deflection. Area packed with gelfoam and small merocel.  Left: no active bleeding. Old clot suctioned.   OC/OP: several large clots suctioned. after packing placed, no active bleeding in OP. (06 Oct 2017 00:11)      PAST MEDICAL & SURGICAL HISTORY:  Hyperlipidemia, unspecified hyperlipidemia type  Hypertension, unspecified type       Review of Systems:  · General	negative  · Skin/Breast	negative  · Ophthalmologic	negative  · ENMT	negative  · Respiratory and Thorax	negative  · Cardiovascular	negative  · Gastrointestinal	negative  · Genitourinary	negative  · Musculoskeletal Comments	negative  · Neurological	negative      MEDICATIONS  (STANDING):  BACItracin   Ointment 1 Application(s) Topical two times a day  docusate sodium 100 milliGRAM(s) Oral two times a day  influenza  Vaccine (HIGH DOSE) 0.5 milliLiter(s) IntraMuscular once  losartan 50 milliGRAM(s) Oral daily  metoprolol succinate ER 25 milliGRAM(s) Oral daily  senna 1 Tablet(s) Oral daily  sodium chloride 0.65% Nasal 1 Spray(s) Both Nostrils every 4 hours  sodium chloride 0.9%. 1000 milliLiter(s) (75 mL/Hr) IV Continuous <Continuous>    MEDICATIONS  (PRN):  acetaminophen   Tablet. 650 milliGRAM(s) Oral every 6 hours PRN Mild Pain (1 - 3)  labetalol 100 milliGRAM(s) Oral every 4 hours PRN SBP>150  LORazepam     Tablet 0.5 milliGRAM(s) Oral every 6 hours PRN Anxiety  ondansetron Injectable 4 milliGRAM(s) IV Push every 6 hours PRN Nausea and/or Vomiting  oxyCODONE    5 mG/acetaminophen 325 mG 1 Tablet(s) Oral every 6 hours PRN Moderate Pain (4 - 6)      Allergies    Vibramycin (Hives)    Intolerances        SOCIAL HISTORY:    FAMILY HISTORY:      Vital Signs Last 24 Hrs  T(C): 36.7 (07 Oct 2017 08:19), Max: 37 (06 Oct 2017 23:48)  T(F): 98 (07 Oct 2017 08:19), Max: 98.6 (06 Oct 2017 23:48)  HR: 98 (07 Oct 2017 08:19) (92 - 110)  BP: 144/77 (07 Oct 2017 08:19) (115/73 - 144/77)  BP(mean): --  RR: 16 (07 Oct 2017 08:19) (15 - 16)  SpO2: 98% (07 Oct 2017 08:19) (96% - 99%)     Physical Exam:  · Constitutional	detailed exam  · Constitutional Details	well-developed; well-groomed  · Eyes	EOMI; PERRL; no drainage or redness  · ENMT Comments	dry mucous membranes  · Respiratory	detailed exam  · Respiratory Comments	normal breath sounds at the lung bases bilaterally  · Cardiovascular	Regular rate & rhythm, normal S1, S2; no murmurs, gallops or rubs; no S3, S4  · Abd-Soft non tender  ·Ext-no edema, clubbing or cyanosis      LABS:                        12.6   8.1   )-----------( 198      ( 07 Oct 2017 07:45 )             36.1     10-07    138  |  102  |  18  ----------------------------<  185<H>  4.4   |  21<L>  |  0.87    Ca    9.3      07 Oct 2017 07:45            RADIOLOGY & ADDITIONAL STUDIES:

## 2017-10-09 ENCOUNTER — TRANSCRIPTION ENCOUNTER (OUTPATIENT)
Age: 72
End: 2017-10-09

## 2017-10-10 ENCOUNTER — APPOINTMENT (OUTPATIENT)
Dept: OTOLARYNGOLOGY | Facility: CLINIC | Age: 72
End: 2017-10-10
Payer: MEDICARE

## 2017-10-10 VITALS
WEIGHT: 190 LBS | SYSTOLIC BLOOD PRESSURE: 127 MMHG | HEART RATE: 64 BPM | BODY MASS INDEX: 26.9 KG/M2 | HEIGHT: 70.5 IN | DIASTOLIC BLOOD PRESSURE: 78 MMHG

## 2017-10-10 DIAGNOSIS — I51.9 HEART DISEASE, UNSPECIFIED: ICD-10-CM

## 2017-10-10 DIAGNOSIS — Z82.49 FAMILY HISTORY OF ISCHEMIC HEART DISEASE AND OTHER DISEASES OF THE CIRCULATORY SYSTEM: ICD-10-CM

## 2017-10-10 DIAGNOSIS — Z83.3 FAMILY HISTORY OF DIABETES MELLITUS: ICD-10-CM

## 2017-10-10 DIAGNOSIS — E78.00 PURE HYPERCHOLESTEROLEMIA, UNSPECIFIED: ICD-10-CM

## 2017-10-10 PROBLEM — Z00.00 ENCOUNTER FOR PREVENTIVE HEALTH EXAMINATION: Status: ACTIVE | Noted: 2017-10-10

## 2017-10-10 PROCEDURE — 31231 NASAL ENDOSCOPY DX: CPT

## 2017-10-10 PROCEDURE — 99204 OFFICE O/P NEW MOD 45 MIN: CPT | Mod: 25

## 2017-10-13 DIAGNOSIS — E78.5 HYPERLIPIDEMIA, UNSPECIFIED: ICD-10-CM

## 2017-10-13 DIAGNOSIS — D50.0 IRON DEFICIENCY ANEMIA SECONDARY TO BLOOD LOSS (CHRONIC): ICD-10-CM

## 2017-10-13 DIAGNOSIS — R04.0 EPISTAXIS: ICD-10-CM

## 2017-10-13 DIAGNOSIS — Z28.21 IMMUNIZATION NOT CARRIED OUT BECAUSE OF PATIENT REFUSAL: ICD-10-CM

## 2017-10-13 DIAGNOSIS — I10 ESSENTIAL (PRIMARY) HYPERTENSION: ICD-10-CM

## 2017-10-13 DIAGNOSIS — Q27.30 ARTERIOVENOUS MALFORMATION, SITE UNSPECIFIED: ICD-10-CM

## 2017-10-16 PROBLEM — Z82.49 FAMILY HISTORY OF HYPERTENSION: Status: ACTIVE | Noted: 2017-10-10

## 2017-10-16 PROBLEM — E78.00 HIGH CHOLESTEROL: Status: ACTIVE | Noted: 2017-10-10

## 2017-10-16 PROBLEM — Z83.3 FAMILY HISTORY OF DIABETES MELLITUS: Status: ACTIVE | Noted: 2017-10-10

## 2017-10-16 PROBLEM — I51.9 HEART PROBLEM: Status: ACTIVE | Noted: 2017-10-10

## 2017-10-16 RX ORDER — METOPROLOL SUCCINATE 25 MG/1
25 TABLET, EXTENDED RELEASE ORAL
Refills: 0 | Status: ACTIVE | COMMUNITY

## 2017-10-16 RX ORDER — LOSARTAN POTASSIUM 50 MG/1
50 TABLET, FILM COATED ORAL
Refills: 0 | Status: ACTIVE | COMMUNITY

## 2017-10-17 ENCOUNTER — APPOINTMENT (OUTPATIENT)
Dept: MRI IMAGING | Facility: CLINIC | Age: 72
End: 2017-10-17

## 2017-10-22 ENCOUNTER — OUTPATIENT (OUTPATIENT)
Dept: OUTPATIENT SERVICES | Facility: HOSPITAL | Age: 72
LOS: 1 days | End: 2017-10-22
Payer: MEDICARE

## 2017-10-22 PROCEDURE — A9585: CPT

## 2017-10-22 PROCEDURE — 70543 MRI ORBT/FAC/NCK W/O &W/DYE: CPT | Mod: 26

## 2017-10-22 PROCEDURE — 70543 MRI ORBT/FAC/NCK W/O &W/DYE: CPT

## 2017-10-24 ENCOUNTER — APPOINTMENT (OUTPATIENT)
Dept: NEUROSURGERY | Facility: CLINIC | Age: 72
End: 2017-10-24
Payer: MEDICARE

## 2017-10-24 VITALS
WEIGHT: 188.25 LBS | HEIGHT: 70.5 IN | SYSTOLIC BLOOD PRESSURE: 135 MMHG | HEART RATE: 66 BPM | DIASTOLIC BLOOD PRESSURE: 72 MMHG | OXYGEN SATURATION: 100 % | BODY MASS INDEX: 26.65 KG/M2

## 2017-10-24 PROCEDURE — 99215 OFFICE O/P EST HI 40 MIN: CPT

## 2017-10-24 RX ORDER — ALPRAZOLAM 0.25 MG/1
0.25 TABLET ORAL
Qty: 2 | Refills: 0 | Status: COMPLETED | COMMUNITY
Start: 2017-10-17 | End: 2017-10-24

## 2017-10-24 RX ORDER — SIMVASTATIN 20 MG/1
20 TABLET, FILM COATED ORAL
Refills: 0 | Status: ACTIVE | COMMUNITY

## 2017-11-07 ENCOUNTER — APPOINTMENT (OUTPATIENT)
Dept: OTOLARYNGOLOGY | Facility: CLINIC | Age: 72
End: 2017-11-07
Payer: MEDICARE

## 2017-11-07 VITALS
HEART RATE: 60 BPM | DIASTOLIC BLOOD PRESSURE: 72 MMHG | SYSTOLIC BLOOD PRESSURE: 132 MMHG | WEIGHT: 190 LBS | HEIGHT: 70.5 IN | BODY MASS INDEX: 26.9 KG/M2

## 2017-11-07 PROCEDURE — 99213 OFFICE O/P EST LOW 20 MIN: CPT | Mod: 25

## 2017-11-07 PROCEDURE — 31231 NASAL ENDOSCOPY DX: CPT

## 2017-12-08 NOTE — ED PROVIDER NOTE - OBJECTIVE STATEMENT
Keep the cast/splint/dressing clean and dry./Instructed patient/caregiver to follow-up with primary care physician./Verbal/written post procedure instructions were given to patient/caregiver./Instructed patient/caregiver regarding signs and symptoms of infection.
72 yo male with recurrent epistaxis tody seen earlier in Ed by ENT  went home  and began to bleed again- no packing was placed- no hx of coagulopathies-- no ASA or coumadin or NOACS-- no fevers or chills  no headache--

## 2017-12-19 PROCEDURE — 85730 THROMBOPLASTIN TIME PARTIAL: CPT

## 2017-12-19 PROCEDURE — C1760: CPT

## 2017-12-19 PROCEDURE — 86850 RBC ANTIBODY SCREEN: CPT

## 2017-12-19 PROCEDURE — C1894: CPT

## 2017-12-19 PROCEDURE — 80053 COMPREHEN METABOLIC PANEL: CPT

## 2017-12-19 PROCEDURE — 36415 COLL VENOUS BLD VENIPUNCTURE: CPT

## 2017-12-19 PROCEDURE — 80048 BASIC METABOLIC PNL TOTAL CA: CPT

## 2017-12-19 PROCEDURE — 85027 COMPLETE CBC AUTOMATED: CPT

## 2017-12-19 PROCEDURE — 85025 COMPLETE CBC W/AUTO DIFF WBC: CPT

## 2017-12-19 PROCEDURE — C1769: CPT

## 2017-12-19 PROCEDURE — 99285 EMERGENCY DEPT VISIT HI MDM: CPT | Mod: 25

## 2017-12-19 PROCEDURE — 86901 BLOOD TYPING SEROLOGIC RH(D): CPT

## 2017-12-19 PROCEDURE — C1889: CPT

## 2017-12-19 PROCEDURE — 96374 THER/PROPH/DIAG INJ IV PUSH: CPT

## 2017-12-19 PROCEDURE — 85610 PROTHROMBIN TIME: CPT

## 2017-12-19 PROCEDURE — 86900 BLOOD TYPING SEROLOGIC ABO: CPT

## 2017-12-19 PROCEDURE — C1887: CPT

## 2018-01-27 ENCOUNTER — INPATIENT (INPATIENT)
Facility: HOSPITAL | Age: 73
LOS: 5 days | Discharge: HOME | DRG: 856 | End: 2018-02-02
Attending: RADIOLOGY | Admitting: RADIOLOGY
Payer: MEDICARE

## 2018-01-27 VITALS
RESPIRATION RATE: 18 BRPM | SYSTOLIC BLOOD PRESSURE: 156 MMHG | OXYGEN SATURATION: 97 % | HEART RATE: 87 BPM | DIASTOLIC BLOOD PRESSURE: 99 MMHG

## 2018-01-27 LAB
ALBUMIN SERPL ELPH-MCNC: 3.6 G/DL — SIGNIFICANT CHANGE UP (ref 3.3–5)
ALP SERPL-CCNC: 50 U/L — SIGNIFICANT CHANGE UP (ref 40–120)
ALT FLD-CCNC: 15 U/L — SIGNIFICANT CHANGE UP (ref 10–45)
ANION GAP SERPL CALC-SCNC: 13 MMOL/L — SIGNIFICANT CHANGE UP (ref 5–17)
ANION GAP SERPL CALC-SCNC: 16 MMOL/L — SIGNIFICANT CHANGE UP (ref 5–17)
APPEARANCE UR: CLEAR — SIGNIFICANT CHANGE UP
APTT BLD: 25.9 SEC — LOW (ref 27.5–37.4)
APTT BLD: 26 SEC — LOW (ref 27.5–37.4)
AST SERPL-CCNC: 24 U/L — SIGNIFICANT CHANGE UP (ref 10–40)
BASE EXCESS BLDA CALC-SCNC: -0.3 MMOL/L — SIGNIFICANT CHANGE UP (ref -2–3)
BASE EXCESS BLDA CALC-SCNC: -3.9 MMOL/L — LOW (ref -2–3)
BASOPHILS NFR BLD AUTO: 0.2 % — SIGNIFICANT CHANGE UP (ref 0–2)
BILIRUB SERPL-MCNC: 0.9 MG/DL — SIGNIFICANT CHANGE UP (ref 0.2–1.2)
BILIRUB UR-MCNC: (no result)
BUN SERPL-MCNC: 13 MG/DL — SIGNIFICANT CHANGE UP (ref 7–23)
BUN SERPL-MCNC: 15 MG/DL — SIGNIFICANT CHANGE UP (ref 7–23)
CA-I BLDA-SCNC: 1.02 MMOL/L — LOW (ref 1.12–1.3)
CALCIUM SERPL-MCNC: 8.2 MG/DL — LOW (ref 8.4–10.5)
CALCIUM SERPL-MCNC: 8.7 MG/DL — SIGNIFICANT CHANGE UP (ref 8.4–10.5)
CHLORIDE SERPL-SCNC: 101 MMOL/L — SIGNIFICANT CHANGE UP (ref 96–108)
CHLORIDE SERPL-SCNC: 97 MMOL/L — SIGNIFICANT CHANGE UP (ref 96–108)
CO2 SERPL-SCNC: 20 MMOL/L — LOW (ref 22–31)
CO2 SERPL-SCNC: 26 MMOL/L — SIGNIFICANT CHANGE UP (ref 22–31)
COHGB MFR BLDA: 0.3 % — SIGNIFICANT CHANGE UP
COLOR SPEC: YELLOW — SIGNIFICANT CHANGE UP
CREAT SERPL-MCNC: 0.77 MG/DL — SIGNIFICANT CHANGE UP (ref 0.5–1.3)
CREAT SERPL-MCNC: 0.87 MG/DL — SIGNIFICANT CHANGE UP (ref 0.5–1.3)
DIFF PNL FLD: NEGATIVE — SIGNIFICANT CHANGE UP
EOSINOPHIL NFR BLD AUTO: 0.2 % — SIGNIFICANT CHANGE UP (ref 0–6)
GAS PNL BLDA: SIGNIFICANT CHANGE UP
GAS PNL BLDA: SIGNIFICANT CHANGE UP
GAS PNL BLDV: SIGNIFICANT CHANGE UP
GLUCOSE BLDC GLUCOMTR-MCNC: 116 MG/DL — HIGH (ref 70–99)
GLUCOSE SERPL-MCNC: 122 MG/DL — HIGH (ref 70–99)
GLUCOSE SERPL-MCNC: 176 MG/DL — HIGH (ref 70–99)
GLUCOSE UR QL: NEGATIVE — SIGNIFICANT CHANGE UP
HCO3 BLDA-SCNC: 21 MMOL/L — SIGNIFICANT CHANGE UP (ref 21–28)
HCO3 BLDA-SCNC: 23 MMOL/L — SIGNIFICANT CHANGE UP (ref 21–28)
HCT VFR BLD CALC: 26.6 % — LOW (ref 39–50)
HCT VFR BLD CALC: 29.2 % — LOW (ref 39–50)
HGB BLD-MCNC: 10.1 G/DL — LOW (ref 13–17)
HGB BLD-MCNC: 9 G/DL — LOW (ref 13–17)
HGB BLDA-MCNC: 9.3 G/DL — LOW (ref 13–17)
INR BLD: 1.16 — SIGNIFICANT CHANGE UP (ref 0.88–1.16)
INR BLD: 1.2 — HIGH (ref 0.88–1.16)
KETONES UR-MCNC: (no result) MG/DL
LACTATE SERPL-SCNC: 1.4 MMOL/L — SIGNIFICANT CHANGE UP (ref 0.5–2)
LACTATE SERPL-SCNC: 2.3 MMOL/L — HIGH (ref 0.5–2)
LEUKOCYTE ESTERASE UR-ACNC: NEGATIVE — SIGNIFICANT CHANGE UP
LYMPHOCYTES # BLD AUTO: 13.4 % — SIGNIFICANT CHANGE UP (ref 13–44)
MCHC RBC-ENTMCNC: 29.5 PG — SIGNIFICANT CHANGE UP (ref 27–34)
MCHC RBC-ENTMCNC: 29.6 PG — SIGNIFICANT CHANGE UP (ref 27–34)
MCHC RBC-ENTMCNC: 33.8 G/DL — SIGNIFICANT CHANGE UP (ref 32–36)
MCHC RBC-ENTMCNC: 34.6 G/DL — SIGNIFICANT CHANGE UP (ref 32–36)
MCV RBC AUTO: 85.6 FL — SIGNIFICANT CHANGE UP (ref 80–100)
MCV RBC AUTO: 87.2 FL — SIGNIFICANT CHANGE UP (ref 80–100)
METHGB MFR BLDA: 0.3 % — SIGNIFICANT CHANGE UP
MONOCYTES NFR BLD AUTO: 13.9 % — SIGNIFICANT CHANGE UP (ref 2–14)
NEUTROPHILS NFR BLD AUTO: 72.3 % — SIGNIFICANT CHANGE UP (ref 43–77)
NITRITE UR-MCNC: NEGATIVE — SIGNIFICANT CHANGE UP
O2 CT VFR BLDA CALC: SIGNIFICANT CHANGE UP (ref 15–23)
OXYHGB MFR BLDA: 99 % — SIGNIFICANT CHANGE UP (ref 94–100)
PCO2 BLDA: 33 MMHG — LOW (ref 35–48)
PCO2 BLDA: 39 MMHG — SIGNIFICANT CHANGE UP (ref 35–48)
PH BLDA: 7.36 — SIGNIFICANT CHANGE UP (ref 7.35–7.45)
PH BLDA: 7.46 — HIGH (ref 7.35–7.45)
PH UR: 5.5 — SIGNIFICANT CHANGE UP (ref 5–8)
PLATELET # BLD AUTO: 275 K/UL — SIGNIFICANT CHANGE UP (ref 150–400)
PLATELET # BLD AUTO: 292 K/UL — SIGNIFICANT CHANGE UP (ref 150–400)
PO2 BLDA: 275 MMHG — HIGH (ref 83–108)
PO2 BLDA: 83 MMHG — SIGNIFICANT CHANGE UP (ref 83–108)
POTASSIUM BLDA-SCNC: 4 MMOL/L — SIGNIFICANT CHANGE UP (ref 3.5–4.9)
POTASSIUM SERPL-MCNC: 3.7 MMOL/L — SIGNIFICANT CHANGE UP (ref 3.5–5.3)
POTASSIUM SERPL-MCNC: 4.2 MMOL/L — SIGNIFICANT CHANGE UP (ref 3.5–5.3)
POTASSIUM SERPL-SCNC: 3.7 MMOL/L — SIGNIFICANT CHANGE UP (ref 3.5–5.3)
POTASSIUM SERPL-SCNC: 4.2 MMOL/L — SIGNIFICANT CHANGE UP (ref 3.5–5.3)
PROT SERPL-MCNC: 7.3 G/DL — SIGNIFICANT CHANGE UP (ref 6–8.3)
PROT UR-MCNC: NEGATIVE MG/DL — SIGNIFICANT CHANGE UP
PROTHROM AB SERPL-ACNC: 12.9 SEC — HIGH (ref 9.8–12.7)
PROTHROM AB SERPL-ACNC: 13.4 SEC — HIGH (ref 9.8–12.7)
RBC # BLD: 3.05 M/UL — LOW (ref 4.2–5.8)
RBC # BLD: 3.41 M/UL — LOW (ref 4.2–5.8)
RBC # FLD: 12.6 % — SIGNIFICANT CHANGE UP (ref 10.3–16.9)
RBC # FLD: 13.1 % — SIGNIFICANT CHANGE UP (ref 10.3–16.9)
SAO2 % BLDA: 100 % — SIGNIFICANT CHANGE UP (ref 95–100)
SAO2 % BLDA: 95 % — SIGNIFICANT CHANGE UP (ref 95–100)
SODIUM BLDA-SCNC: 132 MMOL/L — LOW (ref 138–146)
SODIUM SERPL-SCNC: 136 MMOL/L — SIGNIFICANT CHANGE UP (ref 135–145)
SODIUM SERPL-SCNC: 137 MMOL/L — SIGNIFICANT CHANGE UP (ref 135–145)
SP GR SPEC: >=1.03 — SIGNIFICANT CHANGE UP (ref 1–1.03)
UROBILINOGEN FLD QL: 0.2 E.U./DL — SIGNIFICANT CHANGE UP
WBC # BLD: 12.8 K/UL — HIGH (ref 3.8–10.5)
WBC # BLD: 15.4 K/UL — HIGH (ref 3.8–10.5)
WBC # FLD AUTO: 12.8 K/UL — HIGH (ref 3.8–10.5)
WBC # FLD AUTO: 15.4 K/UL — HIGH (ref 3.8–10.5)

## 2018-01-27 PROCEDURE — 99232 SBSQ HOSP IP/OBS MODERATE 35: CPT | Mod: GC

## 2018-01-27 PROCEDURE — 31237 NSL/SINS NDSC SURG BX POLYPC: CPT | Mod: 50,GC

## 2018-01-27 PROCEDURE — 99285 EMERGENCY DEPT VISIT HI MDM: CPT

## 2018-01-27 PROCEDURE — 70487 CT MAXILLOFACIAL W/DYE: CPT | Mod: 26

## 2018-01-27 PROCEDURE — 71045 X-RAY EXAM CHEST 1 VIEW: CPT | Mod: 26

## 2018-01-27 RX ORDER — SODIUM CHLORIDE 9 MG/ML
1000 INJECTION, SOLUTION INTRAVENOUS
Qty: 0 | Refills: 0 | Status: DISCONTINUED | OUTPATIENT
Start: 2018-01-27 | End: 2018-01-29

## 2018-01-27 RX ORDER — INSULIN LISPRO 100/ML
VIAL (ML) SUBCUTANEOUS EVERY 6 HOURS
Qty: 0 | Refills: 0 | Status: DISCONTINUED | OUTPATIENT
Start: 2018-01-27 | End: 2018-01-30

## 2018-01-27 RX ORDER — SODIUM CHLORIDE 9 MG/ML
500 INJECTION INTRAMUSCULAR; INTRAVENOUS; SUBCUTANEOUS
Qty: 0 | Refills: 0 | Status: COMPLETED | OUTPATIENT
Start: 2018-01-27 | End: 2018-01-27

## 2018-01-27 RX ORDER — METOPROLOL TARTRATE 50 MG
5 TABLET ORAL EVERY 6 HOURS
Qty: 0 | Refills: 0 | Status: DISCONTINUED | OUTPATIENT
Start: 2018-01-27 | End: 2018-01-28

## 2018-01-27 RX ORDER — PIPERACILLIN AND TAZOBACTAM 4; .5 G/20ML; G/20ML
3.38 INJECTION, POWDER, LYOPHILIZED, FOR SOLUTION INTRAVENOUS ONCE
Qty: 0 | Refills: 0 | Status: COMPLETED | OUTPATIENT
Start: 2018-01-27 | End: 2018-01-27

## 2018-01-27 RX ORDER — VANCOMYCIN HCL 1 G
1250 VIAL (EA) INTRAVENOUS ONCE
Qty: 0 | Refills: 0 | Status: COMPLETED | OUTPATIENT
Start: 2018-01-27 | End: 2018-01-27

## 2018-01-27 RX ORDER — DEXTROSE 50 % IN WATER 50 %
25 SYRINGE (ML) INTRAVENOUS ONCE
Qty: 0 | Refills: 0 | Status: DISCONTINUED | OUTPATIENT
Start: 2018-01-27 | End: 2018-01-29

## 2018-01-27 RX ORDER — VANCOMYCIN HCL 1 G
1250 VIAL (EA) INTRAVENOUS EVERY 12 HOURS
Qty: 0 | Refills: 0 | Status: DISCONTINUED | OUTPATIENT
Start: 2018-01-27 | End: 2018-01-29

## 2018-01-27 RX ORDER — DEXTROSE 50 % IN WATER 50 %
12.5 SYRINGE (ML) INTRAVENOUS ONCE
Qty: 0 | Refills: 0 | Status: DISCONTINUED | OUTPATIENT
Start: 2018-01-27 | End: 2018-01-30

## 2018-01-27 RX ORDER — GLUCAGON INJECTION, SOLUTION 0.5 MG/.1ML
1 INJECTION, SOLUTION SUBCUTANEOUS ONCE
Qty: 0 | Refills: 0 | Status: DISCONTINUED | OUTPATIENT
Start: 2018-01-27 | End: 2018-01-29

## 2018-01-27 RX ORDER — METOPROLOL TARTRATE 50 MG
25 TABLET ORAL DAILY
Qty: 0 | Refills: 0 | Status: DISCONTINUED | OUTPATIENT
Start: 2018-01-27 | End: 2018-01-27

## 2018-01-27 RX ORDER — DEXTROSE 50 % IN WATER 50 %
1 SYRINGE (ML) INTRAVENOUS ONCE
Qty: 0 | Refills: 0 | Status: DISCONTINUED | OUTPATIENT
Start: 2018-01-27 | End: 2018-01-29

## 2018-01-27 RX ORDER — VANCOMYCIN HCL 1 G
VIAL (EA) INTRAVENOUS
Qty: 0 | Refills: 0 | Status: COMPLETED | OUTPATIENT
Start: 2018-01-27 | End: 2018-01-27

## 2018-01-27 RX ORDER — PIPERACILLIN AND TAZOBACTAM 4; .5 G/20ML; G/20ML
3.38 INJECTION, POWDER, LYOPHILIZED, FOR SOLUTION INTRAVENOUS EVERY 6 HOURS
Qty: 0 | Refills: 0 | Status: DISCONTINUED | OUTPATIENT
Start: 2018-01-27 | End: 2018-01-30

## 2018-01-27 RX ORDER — ACETAMINOPHEN 500 MG
975 TABLET ORAL ONCE
Qty: 0 | Refills: 0 | Status: COMPLETED | OUTPATIENT
Start: 2018-01-27 | End: 2018-01-27

## 2018-01-27 RX ORDER — ONDANSETRON 8 MG/1
4 TABLET, FILM COATED ORAL EVERY 6 HOURS
Qty: 0 | Refills: 0 | Status: DISCONTINUED | OUTPATIENT
Start: 2018-01-27 | End: 2018-02-02

## 2018-01-27 RX ADMIN — Medication 975 MILLIGRAM(S): at 16:25

## 2018-01-27 RX ADMIN — SODIUM CHLORIDE 2000 MILLILITER(S): 9 INJECTION INTRAMUSCULAR; INTRAVENOUS; SUBCUTANEOUS at 16:45

## 2018-01-27 RX ADMIN — SODIUM CHLORIDE 2000 MILLILITER(S): 9 INJECTION INTRAMUSCULAR; INTRAVENOUS; SUBCUTANEOUS at 16:26

## 2018-01-27 RX ADMIN — Medication 166.67 MILLIGRAM(S): at 16:23

## 2018-01-27 RX ADMIN — SODIUM CHLORIDE 2000 MILLILITER(S): 9 INJECTION INTRAMUSCULAR; INTRAVENOUS; SUBCUTANEOUS at 17:00

## 2018-01-27 RX ADMIN — PIPERACILLIN AND TAZOBACTAM 200 GRAM(S): 4; .5 INJECTION, POWDER, LYOPHILIZED, FOR SOLUTION INTRAVENOUS at 17:53

## 2018-01-27 RX ADMIN — SODIUM CHLORIDE 2000 MILLILITER(S): 9 INJECTION INTRAMUSCULAR; INTRAVENOUS; SUBCUTANEOUS at 17:06

## 2018-01-27 NOTE — H&P ADULT - PMH
Epistaxis, recurrent    Hyperlipidemia, unspecified hyperlipidemia type    Hypertension, unspecified type

## 2018-01-27 NOTE — ED ADULT NURSE NOTE - OBJECTIVE STATEMENT
71 y/o male BIBEMS c/o epistaxis for 5 days. transferred from [place], bilateral nose packing upon arrival. no active bleeding at this time. hx obtained from wife. per wife, bleeding has been mainly from right nare. nasal packing has been in since Tuesday 1/23/18. hx of catastrophic nosebleeds, AVM and skin graft in nose,  pt nonverbal, drowsy, responds to verbal stimuli. edema beneath right and left eyes. pt able to stand with assist to use urinal.  per wife, pt was taking baby aspirin but has not taken it since bleeding began a few days ago. VSS. rectal temp 101, MD [place] aware, medicated as prescribed. 71 y/o male BIBEMS c/o epistaxis for 5 days. transferred from [place], bilateral nose packing upon arrival. no active bleeding at this time. hx obtained from wife. per wife, bleeding has been mainly from right nare. nasal packing has been in since Tuesday 1/23/18. pt transferred here to remove packing in a controlled setting per Dr. Louis. hx of catastrophic nosebleeds, AVM and skin graft in nose,  pt nonverbal, drowsy, responds to verbal stimuli.  per wife this is not pts baseline mental status, reports he is normally a&ox4. edema beneath right and left eyes. pt able to stand with assist to use urinal.  per wife, pt was taking baby aspirin but has not taken it since bleeding began a few days ago. VSS. rectal temp 101, MD [place] aware, medicated as prescribed.

## 2018-01-27 NOTE — ED ADULT TRIAGE NOTE - CHIEF COMPLAINT QUOTE
pt was a transfer from [place]. pt has Epistaxis for 4 days with packing. pt has hx of AVM & needs to me admitted under Dr. Louis.

## 2018-01-27 NOTE — H&P ADULT - HISTORY OF PRESENT ILLNESS
HPI: 72M with hx of recurrent epistaxis 2/2 arterio-venous shunting requiring angio embolization x2, most recently R SPA angioembolization at [facility] 3 months ago.  Since that time patient's epistaxis have been well controlled without further episodes of bleeding.  [place] developed brisk epistaxis which resolved after 2 hours with pressure.   On a subsequent flight epistaxis recurred, lasting ~4 hours and patient was brought to hospital where packing was placed 1/23.  He was given Augmentin and as per wife has been compliant.  During hospital stay patient was given valium for anxiety and PRN medications for BP control.  As per wife has becoming more lethargic with low grade fevers during flights back to [facility].  No bleeding since packs were placed.

## 2018-01-27 NOTE — PROGRESS NOTE ADULT - SUBJECTIVE AND OBJECTIVE BOX
ENT attending    pt seen in ED.  The whole story surrounding this pts admission is known to me.   He has h/o sinonasal AVMs and has undergone multiple intranasal procedures 10-20 yrs ago at OSH for epistaxis and most recently admitted to [facility] 10/2017 for right epistaxis and underwent IR embolization of right SPA and infraorbital artery/AVM and did well thereafter, until 6days ago was on a plane from [facility] to [facility] when he started with right epistaxuis and the plan had to be [place] [employer] [place] [facility]. Thereafter bilateral nasal packs were placed 4-5d ago and he was airlifted to [facility] From there, he was airlifted here for definitive management. He was evaluated by ENTs in [place] (Dr. Jamison) who felt it safe to airlift him here with the packing in place.    Over the course of the airlift here (3 separate stops - [place], [place], [place]) he has become a bit lethargic, but arousable.  On admission here tonight, rectal temp 102, otherwise vitals stable.  WBC 12, lactate 1.4, hct 29 (from 36 in oct 2017)    On exam, bilateral packs in place.  CT reviewed. pansinusitis w airfluid levels. bone overlying skull base, lamina and clivus intact.    plan:  lethargy likely due to SIRS w pansinusitis from indwelling packing iwithout antibiotics for 5 days.  -take to OR for packing removal under anesthesia w endoscopic evaluation. if no bleeding, will admit to ICU for medical optimization and antibiotics and as long as no rebleeding, will go to IR on Monday. If he rebleeds before, will go to IR then emergently. If rebleeds after packing removal in OR today will bring to IR as well.  -all was discussed at length w patient and his wife and all will proceed.

## 2018-01-27 NOTE — ED ADULT NURSE NOTE - CHIEF COMPLAINT QUOTE
pt was a transfer from [place]. pt has Epistaxis for 4 days with packing. pt has hx of AVM & needs to me admitted under Dr. Louis.

## 2018-01-27 NOTE — PROGRESS NOTE ADULT - SUBJECTIVE AND OBJECTIVE BOX
Post-op check    s/p nasal endoscopy, removal of packing, washout of wound    72M with hx of recurrent epistaxis 2/2 nasal AVM requiring angio embolization x2, and R SPA angioembolization 3 months ago ath [facility].  He developed bleeding in outside country [place] 1 week ago with limited access to specialty care.  Patient was seen at multiple hospitals and was packed Tuesday 1/23 with good hemostasis.  Since that time patient has become increasingly lethargic and intermittently febrile.  Brought to the OR today for packing removal.  Large volume of foul smelling packing removed from bilateral nasal cavities and nasopharynx, purulence noted throughout and draining from middle meatus b/l.  Nasal mucosa was macerated throughout but no clear source of bleeding identified.  Patient was extubated in OR but remains lethargic.  Admitted to SICU for close neuromonitoring and monitoring for bleeding.    PE:  Gen: awake but lethargic, inconsistently following commands, no coherent verbalization  Resp: non-labored breathing on oxygen rebreather mask, O2 sat stable in 90s  Nose: maceration of nasal sill b/l, bacitracin applied, no active bleeding  OC/OP: no active bleeding, no visible clots, uniformly dry and irritated oral mucosa    A/P:  72M w/ hx of recurrent epistaxis 2/2 to nasal AVM packed earlier this week for hemostasis and c/b fever and lethargy likely due to SIRs response from infected packing and resulting sinusitis  -Continue SICU monitoring  -q1h neurochecks  -respiratory monitoring: if breathing becomes labored or patient becomes more obtunded consider intubation   -continue broad spectrum antibiotics w/ vanc and zosyn  -f/u OR cultures  -if patient rebleeds call ENT for emergent packing placement and emergent IR embolization, Neuro IR team aware  -epistaxis precautions: nasal saline sprays q4hs, bactroban to b/l nares qhs, avoid nasal manipulation, no nose blowing, open mouth sneezes, if supplemental oxygen required administer via face tent or face mask w/ humidified air  -NPO/IVF  -Call ENT with questions or concerns  -Discussed with attending

## 2018-01-27 NOTE — ED PROVIDER NOTE - OBJECTIVE STATEMENT
71 y/o M w/hx chronic epistaxis 2/2 AVM under care of [facility] ENT Dr. Louis, re-bled on/off for past 3 days, packed as an outpt, referred to ED for evaluation of packing and likely admission for surgical management of recurrent AVM. 73 y/o M w/hx chronic epistaxis 2/2 AVM under care of [facility] ENT Dr. Louis, re-bled on/off for past 3 days, packed as an outpt, referred to ED for evaluation of packing and likely admission for surgical management of recurrent AVM. He was packed at a hospital in [facility] on 1/22 and has been on augmentin since that time. Spiked a fever 100.9 orally yesterday, taking tylenol/motrin. No CP/SOB/palpitations or cough. No abd pain or n/v. No urinary sx or changes in BMs.

## 2018-01-27 NOTE — ED PROVIDER NOTE - PHYSICAL EXAMINATION
VITAL SIGNS: I have reviewed nursing notes and confirm.  CONSTITUTIONAL: Well-developed; well-nourished; in no acute distress.  SKIN: Agree with RN documentation regarding decubitus evaluation. Remainder of skin exam is warm and dry, no acute rash.  HEAD: Normocephalic; atraumatic.  EYES: PERRL, EOM intact; conjunctiva and sclera clear.  ENT: + b/l anterior nasal packing with no drainage/bleeding  NECK: Supple; non tender.  CARD: S1, S2 normal; no murmurs, gallops, or rubs. Regular rate and rhythm.  RESP: No wheezes, rales or rhonchi.  ABD: Normal bowel sounds; soft; non-distended; non-tender; no hepatosplenomegaly.  EXT: Normal ROM. No clubbing, cyanosis or edema.  LYMPH: No acute cervical adenopathy.  NEURO: Alert, oriented. Grossly unremarkable.  PSYCH: Cooperative, appropriate.

## 2018-01-27 NOTE — CONSULT NOTE ADULT - SUBJECTIVE AND OBJECTIVE BOX
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  Since that time patient's epistaxis have been well controlled without further episodes of bleeding.  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   A day later patient was attempting to fly to [place] when during flight epistaxis recurred, lasting ~4 hours and plane was diverted - patient was brought to a local  hospital in [facility] where packing was placed 1/23.  He was given Augmentin and as per wife has been compliant.   Pt was then transferred to [place] for further intervention. During hospital stay patient was given valium for anxiety and PRN medications for BP control.  Wife arranged medical air transport back to [place]. As per wife, has been becoming more lethargic with low grade fevers during last few days.  No bleeding since packs were placed. Pt denies HA, N/V, vision changes, nuchal rigidity. Pt was given Vanco IV in ER and is being prepared to go to OR with ENT for packing removal and washout.    PAST MEDICAL & SURGICAL HISTORY:  Epistaxis, recurrent  Hyperlipidemia, unspecified hyperlipidemia type  Hypertension, unspecified type    FAMILY HISTORY:      SOCIAL HISTORY:  Tobacco Use:  EtOH use:   Substance:    Allergies    Vibramycin (Hives)    Intolerances        REVIEW OF SYSTEMS  see HPI      MEDICATIONS:  Antibiotics:    Neuro:    Anticoagulation:    OTHER:  metoprolol succinate ER 25 milliGRAM(s) Oral daily    IVF:      Vital Signs Last 24 Hrs  T(C): 36.6 (27 Jan 2018 17:58), Max: 38.3 (27 Jan 2018 16:00)  T(F): 97.9 (27 Jan 2018 17:58), Max: 101 (27 Jan 2018 16:00)  HR: 78 (27 Jan 2018 17:58) (78 - 87)  BP: 143/84 (27 Jan 2018 17:58) (143/84 - 156/99)  BP(mean): --  RR: 18 (27 Jan 2018 15:48) (18 - 18)  SpO2: 95% (27 Jan 2018 17:58) (95% - 97%)    PHYSICAL EXAM:  Lethargic but easily arousable  nasal packing in place with yellowish discharge  no active bleeding  swelling of b/l under eye area  AxOX3  FC with delay  No pronator drift  SORAYA  Able to ambulate    LABS:                        10.1   12.8  )-----------( 292      ( 27 Jan 2018 15:42 )             29.2     01-27    136  |  97  |  15  ----------------------------<  122<H>  4.2   |  26  |  0.87    Ca    8.7      27 Jan 2018 15:42    TPro  7.3  /  Alb  3.6  /  TBili  0.9  /  DBili  x   /  AST  24  /  ALT  15  /  AlkPhos  50  01-27    PT/INR - ( 27 Jan 2018 15:42 )   PT: 12.9 sec;   INR: 1.16          PTT - ( 27 Jan 2018 15:42 )  PTT:26.0 sec  Urinalysis Basic - ( 27 Jan 2018 16:30 )    Color: Yellow / Appearance: Clear / SG: >=1.030 / pH: x  Gluc: x / Ketone: Trace mg/dL  / Bili: Small / Urobili: 0.2 E.U./dL   Blood: x / Protein: NEGATIVE mg/dL / Nitrite: NEGATIVE   Leuk Esterase: NEGATIVE / RBC: x / WBC x   Sq Epi: x / Non Sq Epi: x / Bacteria: x      CULTURES:      RADIOLOGY & ADDITIONAL STUDIES: HISTORY OF PRESENT ILLNESS:   72M with hx of 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  Since that time patient's epistaxis have been well controlled without further episodes of bleeding.  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   A day later patient was attempting to fly to [place] when during flight epistaxis recurred, lasting ~4 hours and plane was diverted - patient was brought to a local  hospital in [facility] where packing was placed 1/23.  He was given Augmentin and as per wife has been compliant.   Pt was then transferred to [place] for further intervention. During hospital stay patient was given valium for anxiety and PRN medications for BP control.  Wife arranged medical air transport back to [place]. As per wife, has been becoming more lethargic with low grade fevers during last few days.  No bleeding since packs were placed. Pt denies HA, N/V, vision changes, nuchal rigidity. Pt was given Vanco IV in ER and is being prepared to go to OR with ENT for packing removal and washout. Pt taking ASA 81mg daily    PAST MEDICAL & SURGICAL HISTORY:  Epistaxis, recurrent  Hyperlipidemia, unspecified hyperlipidemia type  Hypertension, unspecified type    FAMILY HISTORY:      SOCIAL HISTORY:  Tobacco Use:  EtOH use:   Substance:    Allergies    Vibramycin (Hives)    Intolerances        REVIEW OF SYSTEMS  see HPI      MEDICATIONS:  Antibiotics:    Neuro:    Anticoagulation:    OTHER:  metoprolol succinate ER 25 milliGRAM(s) Oral daily    IVF:      Vital Signs Last 24 Hrs  T(C): 36.6 (27 Jan 2018 17:58), Max: 38.3 (27 Jan 2018 16:00)  T(F): 97.9 (27 Jan 2018 17:58), Max: 101 (27 Jan 2018 16:00)  HR: 78 (27 Jan 2018 17:58) (78 - 87)  BP: 143/84 (27 Jan 2018 17:58) (143/84 - 156/99)  BP(mean): --  RR: 18 (27 Jan 2018 15:48) (18 - 18)  SpO2: 95% (27 Jan 2018 17:58) (95% - 97%)    PHYSICAL EXAM:  Lethargic but easily arousable  nasal packing in place with yellowish discharge  no active bleeding  swelling of b/l under eye area  AxOX3  FC with delay  No pronator drift  SORAYA  Able to ambulate    LABS:                        10.1   12.8  )-----------( 292      ( 27 Jan 2018 15:42 )             29.2     01-27    136  |  97  |  15  ----------------------------<  122<H>  4.2   |  26  |  0.87    Ca    8.7      27 Jan 2018 15:42    TPro  7.3  /  Alb  3.6  /  TBili  0.9  /  DBili  x   /  AST  24  /  ALT  15  /  AlkPhos  50  01-27    PT/INR - ( 27 Jan 2018 15:42 )   PT: 12.9 sec;   INR: 1.16          PTT - ( 27 Jan 2018 15:42 )  PTT:26.0 sec  Urinalysis Basic - ( 27 Jan 2018 16:30 )    Color: Yellow / Appearance: Clear / SG: >=1.030 / pH: x  Gluc: x / Ketone: Trace mg/dL  / Bili: Small / Urobili: 0.2 E.U./dL   Blood: x / Protein: NEGATIVE mg/dL / Nitrite: NEGATIVE   Leuk Esterase: NEGATIVE / RBC: x / WBC x   Sq Epi: x / Non Sq Epi: x / Bacteria: x      CULTURES:      RADIOLOGY & ADDITIONAL STUDIES:

## 2018-01-27 NOTE — H&P ADULT - ASSESSMENT
72M with hx of recurrent epistaxis 2/2 arterio-venous shunting w/ acute bleed s/p packing now concern for infection 2/2 packing  -NPO/IVF  -book for endoscopic control of epistaxis, possible SPA ligation today with Dr. Louis  -broad spectrum antibx with Vanc/zosyn  -pan-culture: blood, urine  -f/u CXR, EKG  -BP control  -CT sinus w/ contrast  -plan to admit to ICU post-operatively

## 2018-01-27 NOTE — BRIEF OPERATIVE NOTE - PROCEDURE
<<-----Click on this checkbox to enter Procedure Endoscopic nasal cauterization  01/27/2018  removal of nasal packing  Active  [id number]

## 2018-01-27 NOTE — H&P ADULT - NSHPPHYSICALEXAM_GEN_ALL_CORE
T: 102 HR: 87  Gen: lethargic, arousable but falls back asleep easily, follows commands  b/l lower eyelid edema, EOMI  Resp: non-labored breathing on RA  Nose: packing in place b/l, not saturated, no active bleeding  OP: no active bleeding or clots noted in

## 2018-01-28 ENCOUNTER — RESULT REVIEW (OUTPATIENT)
Age: 73
End: 2018-01-28

## 2018-01-28 LAB
ANION GAP SERPL CALC-SCNC: 13 MMOL/L — SIGNIFICANT CHANGE UP (ref 5–17)
APTT BLD: 24.6 SEC — LOW (ref 27.5–37.4)
BUN SERPL-MCNC: 12 MG/DL — SIGNIFICANT CHANGE UP (ref 7–23)
CALCIUM SERPL-MCNC: 8.3 MG/DL — LOW (ref 8.4–10.5)
CHLORIDE SERPL-SCNC: 99 MMOL/L — SIGNIFICANT CHANGE UP (ref 96–108)
CO2 SERPL-SCNC: 24 MMOL/L — SIGNIFICANT CHANGE UP (ref 22–31)
CREAT SERPL-MCNC: 0.88 MG/DL — SIGNIFICANT CHANGE UP (ref 0.5–1.3)
GLUCOSE BLDC GLUCOMTR-MCNC: 106 MG/DL — HIGH (ref 70–99)
GLUCOSE BLDC GLUCOMTR-MCNC: 113 MG/DL — HIGH (ref 70–99)
GLUCOSE BLDC GLUCOMTR-MCNC: 123 MG/DL — HIGH (ref 70–99)
GLUCOSE BLDC GLUCOMTR-MCNC: 183 MG/DL — HIGH (ref 70–99)
GLUCOSE SERPL-MCNC: 120 MG/DL — HIGH (ref 70–99)
HBA1C BLD-MCNC: 5.7 % — HIGH (ref 4–5.6)
HCT VFR BLD CALC: 24.5 % — LOW (ref 39–50)
HGB BLD-MCNC: 8.5 G/DL — LOW (ref 13–17)
INR BLD: 1.22 — HIGH (ref 0.88–1.16)
LACTATE SERPL-SCNC: 1 MMOL/L — SIGNIFICANT CHANGE UP (ref 0.5–2)
MAGNESIUM SERPL-MCNC: 2 MG/DL — SIGNIFICANT CHANGE UP (ref 1.6–2.6)
MCHC RBC-ENTMCNC: 29.4 PG — SIGNIFICANT CHANGE UP (ref 27–34)
MCHC RBC-ENTMCNC: 34.7 G/DL — SIGNIFICANT CHANGE UP (ref 32–36)
MCV RBC AUTO: 84.8 FL — SIGNIFICANT CHANGE UP (ref 80–100)
PHOSPHATE SERPL-MCNC: 2.6 MG/DL — SIGNIFICANT CHANGE UP (ref 2.5–4.5)
PLATELET # BLD AUTO: 264 K/UL — SIGNIFICANT CHANGE UP (ref 150–400)
POTASSIUM SERPL-MCNC: 4 MMOL/L — SIGNIFICANT CHANGE UP (ref 3.5–5.3)
POTASSIUM SERPL-SCNC: 4 MMOL/L — SIGNIFICANT CHANGE UP (ref 3.5–5.3)
PROTHROM AB SERPL-ACNC: 13.6 SEC — HIGH (ref 9.8–12.7)
RBC # BLD: 2.89 M/UL — LOW (ref 4.2–5.8)
RBC # FLD: 13 % — SIGNIFICANT CHANGE UP (ref 10.3–16.9)
SODIUM SERPL-SCNC: 136 MMOL/L — SIGNIFICANT CHANGE UP (ref 135–145)
WBC # BLD: 12.2 K/UL — HIGH (ref 3.8–10.5)
WBC # FLD AUTO: 12.2 K/UL — HIGH (ref 3.8–10.5)

## 2018-01-28 PROCEDURE — 99233 SBSQ HOSP IP/OBS HIGH 50: CPT | Mod: GC

## 2018-01-28 PROCEDURE — 71045 X-RAY EXAM CHEST 1 VIEW: CPT | Mod: 26

## 2018-01-28 PROCEDURE — 93970 EXTREMITY STUDY: CPT | Mod: 26

## 2018-01-28 RX ORDER — SODIUM CHLORIDE 9 MG/ML
500 INJECTION INTRAMUSCULAR; INTRAVENOUS; SUBCUTANEOUS ONCE
Qty: 0 | Refills: 0 | Status: COMPLETED | OUTPATIENT
Start: 2018-01-28 | End: 2018-01-28

## 2018-01-28 RX ORDER — HYDRALAZINE HCL 50 MG
10 TABLET ORAL ONCE
Qty: 0 | Refills: 0 | Status: COMPLETED | OUTPATIENT
Start: 2018-01-28 | End: 2018-01-28

## 2018-01-28 RX ORDER — METOPROLOL TARTRATE 50 MG
5 TABLET ORAL EVERY 6 HOURS
Qty: 0 | Refills: 0 | Status: DISCONTINUED | OUTPATIENT
Start: 2018-01-28 | End: 2018-01-28

## 2018-01-28 RX ORDER — ACETAMINOPHEN 500 MG
1000 TABLET ORAL ONCE
Qty: 0 | Refills: 0 | Status: COMPLETED | OUTPATIENT
Start: 2018-01-28 | End: 2018-01-28

## 2018-01-28 RX ORDER — ACETAMINOPHEN 500 MG
650 TABLET ORAL EVERY 6 HOURS
Qty: 0 | Refills: 0 | Status: DISCONTINUED | OUTPATIENT
Start: 2018-01-28 | End: 2018-02-02

## 2018-01-28 RX ADMIN — PIPERACILLIN AND TAZOBACTAM 200 GRAM(S): 4; .5 INJECTION, POWDER, LYOPHILIZED, FOR SOLUTION INTRAVENOUS at 05:46

## 2018-01-28 RX ADMIN — PIPERACILLIN AND TAZOBACTAM 200 GRAM(S): 4; .5 INJECTION, POWDER, LYOPHILIZED, FOR SOLUTION INTRAVENOUS at 18:37

## 2018-01-28 RX ADMIN — Medication 5 MILLIGRAM(S): at 00:10

## 2018-01-28 RX ADMIN — SODIUM CHLORIDE 1000 MILLILITER(S): 9 INJECTION INTRAMUSCULAR; INTRAVENOUS; SUBCUTANEOUS at 00:11

## 2018-01-28 RX ADMIN — Medication 10 MILLIGRAM(S): at 01:13

## 2018-01-28 RX ADMIN — PIPERACILLIN AND TAZOBACTAM 200 GRAM(S): 4; .5 INJECTION, POWDER, LYOPHILIZED, FOR SOLUTION INTRAVENOUS at 01:13

## 2018-01-28 RX ADMIN — SODIUM CHLORIDE 125 MILLILITER(S): 9 INJECTION, SOLUTION INTRAVENOUS at 05:47

## 2018-01-28 RX ADMIN — PIPERACILLIN AND TAZOBACTAM 200 GRAM(S): 4; .5 INJECTION, POWDER, LYOPHILIZED, FOR SOLUTION INTRAVENOUS at 23:14

## 2018-01-28 RX ADMIN — PIPERACILLIN AND TAZOBACTAM 200 GRAM(S): 4; .5 INJECTION, POWDER, LYOPHILIZED, FOR SOLUTION INTRAVENOUS at 12:33

## 2018-01-28 RX ADMIN — Medication 400 MILLIGRAM(S): at 05:33

## 2018-01-28 RX ADMIN — Medication 166.67 MILLIGRAM(S): at 18:37

## 2018-01-28 RX ADMIN — Medication 5 MILLIGRAM(S): at 05:46

## 2018-01-28 RX ADMIN — Medication 166.67 MILLIGRAM(S): at 05:45

## 2018-01-28 RX ADMIN — Medication 650 MILLIGRAM(S): at 15:30

## 2018-01-28 NOTE — PROGRESS NOTE ADULT - SUBJECTIVE AND OBJECTIVE BOX
S/Overnight events:    Overnight nasal packings removed, and cultures obtained. Mental status improving. Spiked fever 102. 3 F at 5am. On vanco and zosyn.     Hospital Course:   1/28: Overnight nasal packings removed, and cultures obtained. Mental status improving. Spiked fever 102. 3 F at 5am. On vanco and zosyn.     Vital Signs Last 24 Hrs  T(C): 37.2 (28 Jan 2018 09:00), Max: 39.1 (28 Jan 2018 05:00)  T(F): 99 (28 Jan 2018 09:00), Max: 102.3 (28 Jan 2018 05:00)  HR: 96 (28 Jan 2018 12:01) (70 - 110)  BP: 114/62 (28 Jan 2018 09:01) (114/62 - 156/99)  BP(mean): 78 (28 Jan 2018 09:01) (78 - 115)  RR: 16 (28 Jan 2018 12:01) (16 - 29)  SpO2: 96% (28 Jan 2018 12:01) (93% - 100%)    I&O's Detail    27 Jan 2018 07:01  -  28 Jan 2018 07:00  --------------------------------------------------------  IN:    IV PiggyBack: 1050 mL    lactated ringers.: 850 mL  Total IN: 1900 mL    OUT:    Indwelling Catheter - Urethral: 2325 mL  Total OUT: 2325 mL    Total NET: -425 mL      28 Jan 2018 07:01  -  28 Jan 2018 12:34  --------------------------------------------------------  IN:    lactated ringers.: 375 mL  Total IN: 375 mL    OUT:    Voided: 350 mL  Total OUT: 350 mL    Total NET: 25 mL        I&O's Summary    27 Jan 2018 07:01  -  28 Jan 2018 07:00  --------------------------------------------------------  IN: 1900 mL / OUT: 2325 mL / NET: -425 mL    28 Jan 2018 07:01  -  28 Jan 2018 12:34  --------------------------------------------------------  IN: 375 mL / OUT: 350 mL / NET: 25 mL    PHYSICAL EXAM:  Neurological: AAOx3, FC, speech coherent  CNII-XII: EOM intact, PERRL, face symmetric, togue midline, (baseline decreased hearing)  Motor: MAEx4 5/5 UE and LE B/L  ENT: No active bleeding/drainage from nose noted         [x] warm well perfused; capillary refill <3 seconds   Sensation: [x] intact to light touch  [] decreased:     TUBES/LINES:  [] CVC  [] A-line  [] Lumbar Drain  [] Ventriculostomy  [] Other    DIET:  [] NPO  [x] Mechanical  [] Tube feeds    LABS:                        8.5    12.2  )-----------( 264      ( 28 Jan 2018 06:33 )             24.5     01-28    136  |  99  |  12  ----------------------------<  120<H>  4.0   |  24  |  0.88    Ca    8.3<L>      28 Jan 2018 06:33  Phos  2.6     01-28  Mg     2.0     01-28    TPro  7.3  /  Alb  3.6  /  TBili  0.9  /  DBili  x   /  AST  24  /  ALT  15  /  AlkPhos  50  01-27    PT/INR - ( 28 Jan 2018 06:33 )   PT: 13.6 sec;   INR: 1.22          PTT - ( 28 Jan 2018 06:33 )  PTT:24.6 sec  Urinalysis Basic - ( 27 Jan 2018 16:30 )    Color: Yellow / Appearance: Clear / SG: >=1.030 / pH: x  Gluc: x / Ketone: Trace mg/dL  / Bili: Small / Urobili: 0.2 E.U./dL   Blood: x / Protein: NEGATIVE mg/dL / Nitrite: NEGATIVE   Leuk Esterase: NEGATIVE / RBC: x / WBC x   Sq Epi: x / Non Sq Epi: x / Bacteria: x          CAPILLARY BLOOD GLUCOSE      POCT Blood Glucose.: 106 mg/dL (28 Jan 2018 11:08)  POCT Blood Glucose.: 113 mg/dL (28 Jan 2018 06:06)  POCT Blood Glucose.: 116 mg/dL (27 Jan 2018 20:50)      Drug Levels: [] N/A    CSF Analysis: [] N/A      Allergies    Vibramycin (Hives)    Intolerances      MEDICATIONS:  Antibiotics:  piperacillin/tazobactam IVPB. 3.375 Gram(s) IV Intermittent every 6 hours  vancomycin  IVPB 1250 milliGRAM(s) IV Intermittent every 12 hours    Neuro:  ondansetron Injectable 4 milliGRAM(s) IV Push every 6 hours PRN    Anticoagulation:    OTHER:  dextrose 50% Injectable 12.5 Gram(s) IV Push once  dextrose 50% Injectable 25 Gram(s) IV Push once  dextrose Gel 1 Dose(s) Oral once PRN  glucagon  Injectable 1 milliGRAM(s) IntraMuscular once PRN  insulin lispro (HumaLOG) corrective regimen sliding scale   SubCutaneous every 6 hours  metoprolol    tartrate Injectable 5 milliGRAM(s) IV Push every 6 hours PRN    IVF:  dextrose 5%. 1000 milliLiter(s) IV Continuous <Continuous>  lactated ringers. 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 Jrogensen

## 2018-01-28 NOTE — CONSULT NOTE ADULT - SUBJECTIVE AND OBJECTIVE BOX
HPI:  HPI: 72M with hx of recurrent epistaxis 2/2 arterio-venous shunting requiring angio embolization x2, most recently R SPA angioembolization at [facility] 3 months ago.  Since that time patient's epistaxis have been well controlled without further episodes of bleeding.  [place] brisk epistaxis which resolved after 2 hours with pressure.   On a subsequent flight epistaxis recurred, lasting ~4 hours and patient was brought to hospital where packing was placed 1/23.  He was given Augmentin and as per wife has been compliant.  During hospital stay patient was given valium for anxiety and PRN medications for BP control.  As per wife has becoming more lethargic with low grade fevers during flights back to [place] by [facility].  No bleeding since packs were placed. (27 Jan 2018 16:32)    Patient went to the OR and underwent endoscopic nasal cauterization and removal of nasal packing. Extubated post-op and transferred to SICU for airway monitoring.    PAST MEDICAL & SURGICAL HISTORY:  Epistaxis, recurrent  Hyperlipidemia, unspecified hyperlipidemia type  Hypertension, unspecified type      ROS: See HPI    MEDICATIONS:  ALLERGIES:    SOCIAL HISTORY:  Smoke: Never Smoker  EtOH: occasional    Vital Signs Last 24 Hrs  T(C): 37.2 (27 Jan 2018 22:00), Max: 38.3 (27 Jan 2018 16:00)  T(F): 99 (27 Jan 2018 22:00), Max: 101 (27 Jan 2018 16:00)  HR: 86 (28 Jan 2018 00:00) (70 - 98)  BP: 152/78 (28 Jan 2018 00:00) (139/71 - 156/99)  BP(mean): 101 (28 Jan 2018 00:00) (92 - 115)  RR: 26 (28 Jan 2018 00:00) (18 - 29)  SpO2: 100% (28 Jan 2018 00:00) (93% - 100%)    PHYSICAL EXAM  Gen: NAD, agitated  Head: PERRLA, EOMI, no active bleeding from nostrils  CV: RRR, hypertensive  Pulm: CTAB, NR  Abd: Soft, NT/ND    LABS:                        9.0    15.4  )-----------( 275      ( 27 Jan 2018 22:27 )             26.6     01-27    137  |  101  |  13  ----------------------------<  176<H>  3.7   |  20<L>  |  0.77    Ca    8.2<L>      27 Jan 2018 22:27    TPro  7.3  /  Alb  3.6  /  TBili  0.9  /  DBili  x   /  AST  24  /  ALT  15  /  AlkPhos  50  01-27    PT/INR - ( 27 Jan 2018 22:27 )   PT: 13.4 sec;   INR: 1.20          PTT - ( 27 Jan 2018 22:27 )  PTT:25.9 sec  Urinalysis Basic - ( 27 Jan 2018 16:30 )    Color: Yellow / Appearance: Clear / SG: >=1.030 / pH: x  Gluc: x / Ketone: Trace mg/dL  / Bili: Small / Urobili: 0.2 E.U./dL   Blood: x / Protein: NEGATIVE mg/dL / Nitrite: NEGATIVE   Leuk Esterase: NEGATIVE / RBC: x / WBC x   Sq Epi: x / Non Sq Epi: x / Bacteria: x        ASSESSMENT AND PLAN:  71 yo M with hx of epistaxis 2/2 AVM, s/p embolization 3 months ago, now with recurrent bleed while abroad treated with 5-days of nasal packing, now s/p packing removal and endoscopic nasal cauterization    Neuro: neurochecks@1hr  CV: normotensive goals, lopressor prn  Pulm: on NR, airway monitoring  GI: NPO, LR@125  : [place]  ID: Vanc/Zosyn (1/27--)  Endo: ISS  HEME: hold SQH  PPx: SCD  Lines: JACK[place], [place] (1/27--)  Wounds: None

## 2018-01-28 NOTE — PROGRESS NOTE ADULT - SUBJECTIVE AND OBJECTIVE BOX
S: No new issues/events overnight, no new med c/o    O: ICU Vital Signs Last 24 Hrs  T(F): 99 (01-28-18 @ 09:00), Max: 102.3 (01-28-18 @ 05:00)  HR: 88 (01-28-18 @ 11:00) (70 - 110)  BP: 114/62 (01-28-18 @ 09:01) (114/62 - 156/99)  BP(mean): 78 (01-28-18 @ 09:01) (78 - 115)  ABP: 140/60 (01-28-18 @ 11:00)  RR: 16 (01-28-18 @ 11:00) (16 - 29)  SpO2: 95% (01-28-18 @ 11:00) (93% - 100%)    PHYSICAL EXAM:     Neurological: AAOx3, CNII-XII intact,  strength 5/5 b/l  Cardiovascular: RRR  Respiratory: CTA  Gastrointestinal: soft, NT, ND, BS+  Extremities: warm, no dependent edema  Vascular: no cyanosis/erythema    LABS:    01-28    136  |  99  |  12  ----------------------------<  120<H>  4.0   |  24  |  0.88    Ca    8.3<L>      28 Jan 2018 06:33  Phos  2.6     01-28  Mg     2.0     01-28    TPro  7.3  /  Alb  3.6  /  TBili  0.9  /  DBili  x   /  AST  24  /  ALT  15  /  AlkPhos  50  01-27  LIVER FUNCTIONS - ( 27 Jan 2018 15:42 )  Alb: 3.6 g/dL / Pro: 7.3 g/dL / ALK PHOS: 50 U/L / ALT: 15 U/L / AST: 24 U/L / GGT: x                               8.5    12.2  )-----------( 264      ( 28 Jan 2018 06:33 )             24.5   PT/INR - ( 28 Jan 2018 06:33 )   PT: 13.6 sec;   INR: 1.22          PTT - ( 28 Jan 2018 06:33 )  PTT:24.6 sec  ABG - ( 27 Jan 2018 22:33 )  pH: 7.36  /  pCO2: 39    /  pO2: 83    / HCO3: 21    / Base Excess: -3.9  /  SaO2: 95              Urinalysis Basic - ( 27 Jan 2018 16:30 )    Color: Yellow / Appearance: Clear / SG: >=1.030 / pH: x  Gluc: x / Ketone: Trace mg/dL  / Bili: Small / Urobili: 0.2 E.U./dL   Blood: x / Protein: NEGATIVE mg/dL / Nitrite: NEGATIVE   Leuk Esterase: NEGATIVE / RBC: x / WBC x   Sq Epi: x / Non Sq Epi: x / Bacteria: x    CAPILLARY BLOOD GLUCOSE      POCT Blood Glucose.: 106 mg/dL (28 Jan 2018 11:08)  POCT Blood Glucose.: 113 mg/dL (28 Jan 2018 06:06)  POCT Blood Glucose.: 116 mg/dL (27 Jan 2018 20:50)    MEDICATIONS  (STANDING):  dextrose 5%. 1000 milliLiter(s) (50 mL/Hr) IV Continuous <Continuous>  dextrose 50% Injectable 12.5 Gram(s) IV Push once  dextrose 50% Injectable 25 Gram(s) IV Push once  insulin lispro (HumaLOG) corrective regimen sliding scale   SubCutaneous every 6 hours  lactated ringers. 1000 milliLiter(s) (125 mL/Hr) IV Continuous <Continuous>  piperacillin/tazobactam IVPB. 3.375 Gram(s) IV Intermittent every 6 hours  vancomycin  IVPB 1250 milliGRAM(s) IV Intermittent every 12 hours    MEDICATIONS  (PRN):  dextrose Gel 1 Dose(s) Oral once PRN Blood Glucose LESS THAN 70 milliGRAM(s)/deciliter  glucagon  Injectable 1 milliGRAM(s) IntraMuscular once PRN Glucose LESS THAN 70 milligrams/deciliter  metoprolol    tartrate Injectable 5 milliGRAM(s) IV Push every 6 hours PRN SBP > 140  ondansetron Injectable 4 milliGRAM(s) IV Push every 6 hours PRN Nausea      Reardon:	  [ ] None	[ ] Daily Reardon Order Placed	   Indication:	  [ ] Strict I and O's    [ ] Obstruction     [ ] Incontinence + Stage 3 or 4 Decubitus  Central Line:  [ ] None	   [ ]  Medication / TPN Administration     [ ] No Peripheral IV pt is a 72M recurrent epistaxis 2/2 arterio-venous shunting requiring angio embolization x2, most recently R SPA angioembolization at [facility] Oct 2017. pt found to have epistaxis again while traveling in [place], went to hospital for nasal packing placement 1/23 and ppx Augmentin. pt was also given valium for anxiety and BP meds for HTN while abroad. pt found to have progressive lethargy w/ low grade temps enroute back to [place] 1/27, taken to [facility], taken to OR by ENT for nasal packing removal, no source of bleeding identified, but found macerated mucosa bilaterally w/purulent drainage from middle meatus b/l and foul smelling drainage. transferred to SICU post-op around 10pm, pt was intubated for packing removal, but extubated prior to coming to SICU. he was agitated and combative overnight, placed on 1:1 observation, but not medicated for agitation.     S: pt seen/examined this morning, awake alert, calm, without signs of agitation.     O: ICU Vital Signs Last 24 Hrs  T(F): 99 (01-28-18 @ 09:00), Max: 102.3 (01-28-18 @ 05:00)  HR: 88 (01-28-18 @ 11:00) (70 - 110)  BP: 114/62 (01-28-18 @ 09:01) (114/62 - 156/99)  BP(mean): 78 (01-28-18 @ 09:01) (78 - 115)  ABP: 140/60 (01-28-18 @ 11:00)  RR: 16 (01-28-18 @ 11:00) (16 - 29)  SpO2: 95% (01-28-18 @ 11:00) (93% - 100%)    PHYSICAL EXAM:     Neurological: AAOx3, CNII-XII intact,  strength 5/5 b/l  ENT: no signs of bleeding from nares at this time.   Cardiovascular: RRR  Respiratory: CTA  Gastrointestinal: soft, NT, ND, BS+  Extremities: warm, no dependent edema  Vascular: no cyanosis/erythema    LABS:    01-28    136  |  99  |  12  ----------------------------<  120<H>  4.0   |  24  |  0.88    Ca    8.3<L>      28 Jan 2018 06:33  Phos  2.6     01-28  Mg     2.0     01-28    TPro  7.3  /  Alb  3.6  /  TBili  0.9  /  DBili  x   /  AST  24  /  ALT  15  /  AlkPhos  50  01-27  LIVER FUNCTIONS - ( 27 Jan 2018 15:42 )  Alb: 3.6 g/dL / Pro: 7.3 g/dL / ALK PHOS: 50 U/L / ALT: 15 U/L / AST: 24 U/L / GGT: x                               8.5    12.2  )-----------( 264      ( 28 Jan 2018 06:33 )             24.5   PT/INR - ( 28 Jan 2018 06:33 )   PT: 13.6 sec;   INR: 1.22          PTT - ( 28 Jan 2018 06:33 )  PTT:24.6 sec  ABG - ( 27 Jan 2018 22:33 )  pH: 7.36  /  pCO2: 39    /  pO2: 83    / HCO3: 21    / Base Excess: -3.9  /  SaO2: 95          Urinalysis Basic - ( 27 Jan 2018 16:30 )    Color: Yellow / Appearance: Clear / SG: >=1.030 / pH: x  Gluc: x / Ketone: Trace mg/dL  / Bili: Small / Urobili: 0.2 E.U./dL   Blood: x / Protein: NEGATIVE mg/dL / Nitrite: NEGATIVE   Leuk Esterase: NEGATIVE / RBC: x / WBC x   Sq Epi: x / Non Sq Epi: x / Bacteria: x    CAPILLARY BLOOD GLUCOSE      POCT Blood Glucose.: 106 mg/dL (28 Jan 2018 11:08)  POCT Blood Glucose.: 113 mg/dL (28 Jan 2018 06:06)  POCT Blood Glucose.: 116 mg/dL (27 Jan 2018 20:50)    MEDICATIONS  (STANDING):  insulin lispro (HumaLOG) corrective regimen sliding scale   SubCutaneous every 6 hours  lactated ringers. 1000 milliLiter(s) (125 mL/Hr) IV Continuous <Continuous>  piperacillin/tazobactam IVPB. 3.375 Gram(s) IV Intermittent every 6 hours  vancomycin  IVPB 1250 milliGRAM(s) IV Intermittent every 12 hours    MEDICATIONS  (PRN):  dextrose Gel 1 Dose(s) Oral once PRN Blood Glucose LESS THAN 70 milliGRAM(s)/deciliter  glucagon  Injectable 1 milliGRAM(s) IntraMuscular once PRN Glucose LESS THAN 70 milligrams/deciliter  metoprolol    tartrate Injectable 5 milliGRAM(s) IV Push every 6 hours PRN SBP > 140  ondansetron Injectable 4 milliGRAM(s) IV Push every 6 hours PRN Nausea      Reardon:	  [ ] None	[ x] Daily Reardon Order Placed	   Indication:	  [x ] Strict I and O's    [ ] Obstruction     [ ] Incontinence + Stage 3 or 4 Decubitus  Central Line:  [ x] None	   [ ]  Medication / TPN Administration     [ ] No Peripheral IV pt is a 72M recurrent epistaxis due to arterio-venous shunting requiring angio embolization x2, most recently R SPA angioembolization at [facility] Oct 2017. pt found to have epistaxis again while traveling in [place], went to hospital for nasal packing placement 1/23 and ppx Augmentin. pt was also given valium for anxiety and BP meds for HTN while abroad. pt found to have progressive lethargy w/ low grade temps [place]route back to [place] 1/27, taken to [facility], taken to OR by ENT for nasal packing removal, no source of bleeding identified, but found macerated mucosa bilaterally w/purulent drainage from middle meatus b/l and foul smelling drainage. transferred to SICU post-op around 10pm, pt was intubated for packing removal, but extubated prior to coming to SICU. he was agitated and combative overnight, placed on 1:1 observation, but not medicated for agitation.     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 HL, HTN,   MEDS: ativan PRN, colace, toprol 25qd, cozaar 50qd.     SURGERIES:  10/6/17: (Neurosurg) angio embo suspicious for AVM, s/p embo of R SPA and glue in R infraorbital artery.       S: pt seen/examined this morning, awake alert, calm, without signs of agitation.     O: ICU Vital Signs Last 24 Hrs  T(F): 99 (01-28-18 @ 09:00), Max: 102.3 (01-28-18 @ 05:00)  HR: 88 (01-28-18 @ 11:00) (70 - 110)  BP: 114/62 (01-28-18 @ 09:01) (114/62 - 156/99)  BP(mean): 78 (01-28-18 @ 09:01) (78 - 115)  ABP: 140/60 (01-28-18 @ 11:00)  RR: 16 (01-28-18 @ 11:00) (16 - 29)  SpO2: 95% (01-28-18 @ 11:00) (93% - 100%)    PHYSICAL EXAM:     Neurological: AAOx3, CNII-XII intact,  strength 5/5 b/l  ENT: no signs of bleeding from nares at this time.   Cardiovascular: RRR  Respiratory: CTA  Gastrointestinal: soft, NT, ND, BS+  Extremities: warm, no dependent edema  Vascular: no cyanosis/erythema    LABS:    01-28    136  |  99  |  12  ----------------------------<  120<H>  4.0   |  24  |  0.88    Ca    8.3<L>      28 Jan 2018 06:33  Phos  2.6     01-28  Mg     2.0     01-28    TPro  7.3  /  Alb  3.6  /  TBili  0.9  /  DBili  x   /  AST  24  /  ALT  15  /  AlkPhos  50  01-27  LIVER FUNCTIONS - ( 27 Jan 2018 15:42 )  Alb: 3.6 g/dL / Pro: 7.3 g/dL / ALK PHOS: 50 U/L / ALT: 15 U/L / AST: 24 U/L / GGT: x                               8.5    12.2  )-----------( 264      ( 28 Jan 2018 06:33 )             24.5   PT/INR - ( 28 Jan 2018 06:33 )   PT: 13.6 sec;   INR: 1.22          PTT - ( 28 Jan 2018 06:33 )  PTT:24.6 sec  ABG - ( 27 Jan 2018 22:33 )  pH: 7.36  /  pCO2: 39    /  pO2: 83    / HCO3: 21    / Base Excess: -3.9  /  SaO2: 95          Urinalysis Basic - ( 27 Jan 2018 16:30 )    Color: Yellow / Appearance: Clear / SG: >=1.030 / pH: x  Gluc: x / Ketone: Trace mg/dL  / Bili: Small / Urobili: 0.2 E.U./dL   Blood: x / Protein: NEGATIVE mg/dL / Nitrite: NEGATIVE   Leuk Esterase: NEGATIVE / RBC: x / WBC x   Sq Epi: x / Non Sq Epi: x / Bacteria: x    CAPILLARY BLOOD GLUCOSE      POCT Blood Glucose.: 106 mg/dL (28 Jan 2018 11:08)  POCT Blood Glucose.: 113 mg/dL (28 Jan 2018 06:06)  POCT Blood Glucose.: 116 mg/dL (27 Jan 2018 20:50)    MEDICATIONS  (STANDING):  insulin lispro (HumaLOG) corrective regimen sliding scale   SubCutaneous every 6 hours  lactated ringers. 1000 milliLiter(s) (125 mL/Hr) IV Continuous <Continuous>  piperacillin/tazobactam IVPB. 3.375 Gram(s) IV Intermittent every 6 hours  vancomycin  IVPB 1250 milliGRAM(s) IV Intermittent every 12 hours    MEDICATIONS  (PRN):  dextrose Gel 1 Dose(s) Oral once PRN Blood Glucose LESS THAN 70 milliGRAM(s)/deciliter  glucagon  Injectable 1 milliGRAM(s) IntraMuscular once PRN Glucose LESS THAN 70 milligrams/deciliter  metoprolol    tartrate Injectable 5 milliGRAM(s) IV Push every 6 hours PRN SBP > 140  ondansetron Injectable 4 milliGRAM(s) IV Push every 6 hours PRN Nausea      Reardon:	  [ ] None	[ x] Daily Reardon Order Placed	   Indication:	  [x ] Strict I and O's    [ ] Obstruction     [ ] Incontinence + Stage 3 or 4 Decubitus  Central Line:  [ x] None	   [ ]  Medication / TPN Administration     [ ] No Peripheral IV pt is a 72M recurrent epistaxis due to arterio-venous shunting requiring angio embolization x2, most recently R SPA angioembolization at [facility] Oct 2017. pt found to have epistaxis again while traveling in [place], went to hospital for nasal packing placement 1/23 and ppx Augmentin. pt was also given valium for anxiety and BP meds for HTN while abroad. pt found to have progressive lethargy w/ low grade temps enroute back to [place] 1/27, taken to [facility], taken to OR by ENT for nasal packing removal, no source of bleeding identified, but found macerated mucosa bilaterally w/purulent drainage from middle meatus b/l and foul smelling drainage. transferred to SICU post-op around 10pm, pt was intubated for packing removal, but extubated prior to coming to SICU. he was agitated and combative overnight, placed on 1:1 observation, but not medicated for agitation. also had high BP last night - given hydralazine PRN and started on lopressor IV.     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 Oct 2017. HL, HTN,   MEDS: ativan PRN, colace, toprol 25qd, cozaar 50qd.     SURGERIES:  10/6/17: (Neurosurg) angio embo suspicious for AVM, s/p embo of R SPA and glue in R infraorbital artery.       S: pt seen/examined this morning, awake alert, calm, without signs of agitation.     O: ICU Vital Signs Last 24 Hrs  T(F): 99 (01-28-18 @ 09:00), Max: 102.3 (01-28-18 @ 05:00)  HR: 88 (01-28-18 @ 11:00) (70 - 110)  BP: 114/62 (01-28-18 @ 09:01) (114/62 - 156/99)  BP(mean): 78 (01-28-18 @ 09:01) (78 - 115)  ABP: 140/60 (01-28-18 @ 11:00)  RR: 16 (01-28-18 @ 11:00) (16 - 29)  SpO2: 95% (01-28-18 @ 11:00) (93% - 100%)    PHYSICAL EXAM:     Neurological: AAOx3, CNII-XII intact,  strength 5/5 b/l  ENT: no signs of bleeding from nares at this time.   Cardiovascular: RRR  Respiratory: CTA  Gastrointestinal: soft, NT, ND, BS+  Extremities: warm, no dependent edema  Vascular: no cyanosis/erythema    LABS:    01-28    136  |  99  |  12  ----------------------------<  120<H>  4.0   |  24  |  0.88    Ca    8.3<L>      28 Jan 2018 06:33  Phos  2.6     01-28  Mg     2.0     01-28    TPro  7.3  /  Alb  3.6  /  TBili  0.9  /  DBili  x   /  AST  24  /  ALT  15  /  AlkPhos  50  01-27  LIVER FUNCTIONS - ( 27 Jan 2018 15:42 )  Alb: 3.6 g/dL / Pro: 7.3 g/dL / ALK PHOS: 50 U/L / ALT: 15 U/L / AST: 24 U/L / GGT: x                               8.5    12.2  )-----------( 264      ( 28 Jan 2018 06:33 )             24.5   PT/INR - ( 28 Jan 2018 06:33 )   PT: 13.6 sec;   INR: 1.22          PTT - ( 28 Jan 2018 06:33 )  PTT:24.6 sec  ABG - ( 27 Jan 2018 22:33 )  pH: 7.36  /  pCO2: 39    /  pO2: 83    / HCO3: 21    / Base Excess: -3.9  /  SaO2: 95          Urinalysis Basic - ( 27 Jan 2018 16:30 )    Color: Yellow / Appearance: Clear / SG: >=1.030 / pH: x  Gluc: x / Ketone: Trace mg/dL  / Bili: Small / Urobili: 0.2 E.U./dL   Blood: x / Protein: NEGATIVE mg/dL / Nitrite: NEGATIVE   Leuk Esterase: NEGATIVE / RBC: x / WBC x   Sq Epi: x / Non Sq Epi: x / Bacteria: x    CAPILLARY BLOOD GLUCOSE      POCT Blood Glucose.: 106 mg/dL (28 Jan 2018 11:08)  POCT Blood Glucose.: 113 mg/dL (28 Jan 2018 06:06)  POCT Blood Glucose.: 116 mg/dL (27 Jan 2018 20:50)    MEDICATIONS  (STANDING):  insulin lispro (HumaLOG) corrective regimen sliding scale   SubCutaneous every 6 hours  lactated ringers. 1000 milliLiter(s) (125 mL/Hr) IV Continuous <Continuous>  piperacillin/tazobactam IVPB. 3.375 Gram(s) IV Intermittent every 6 hours  vancomycin  IVPB 1250 milliGRAM(s) IV Intermittent every 12 hours    MEDICATIONS  (PRN):  dextrose Gel 1 Dose(s) Oral once PRN Blood Glucose LESS THAN 70 milliGRAM(s)/deciliter  glucagon  Injectable 1 milliGRAM(s) IntraMuscular once PRN Glucose LESS THAN 70 milligrams/deciliter  metoprolol    tartrate Injectable 5 milliGRAM(s) IV Push every 6 hours PRN SBP > 140  ondansetron Injectable 4 milliGRAM(s) IV Push every 6 hours PRN Nausea      Reardon:	  [ ] None	[ x] Daily Reardon Order Placed	   Indication:	  [x ] Strict I and O's    [ ] Obstruction     [ ] Incontinence + Stage 3 or 4 Decubitus  Central Line:  [ x] None	   [ ]  Medication / TPN Administration     [ ] No Peripheral IV

## 2018-01-28 NOTE — PROGRESS NOTE ADULT - ASSESSMENT
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   GI: soft diet, NPO after MN, LR@125  : TOV  ID: infected nasal packing: Vanc/Zosyn (1/27--)  Endo: ISS  HEME: hold SQH  PPx: SCD  Lines: PIV, [place] (1/27--)  Wounds: None 73 yo M with hx of epistaxis due to AVM, s/p embolization 3 months ago, now with recurrent bleed while abroad treated with 5-days of nasal packing, now s/p removal of infected nasal packing    Neuro: no issues  CV: normotensive goals, lopressor 5q6h  Pulm: humified face tent.   GI: soft diet, NPO after MN, LR@125  : TOV  ID: infected nasal packing: Vanc/Zosyn (1/27--)  Endo: ISS  HEME: hold SQH  PPx: SCD  Lines: PIV, [place] (1/27--)  Wounds: None 73 yo M with hx of epistaxis due to AVM, s/p embolization 3 months ago, now with recurrent bleed while [place] treated with 5-days of nasal packing, now s/p OR removal of infected nasal packing    Neuro: no issues  CV: normotensive goals, continue lopressor 5q6h, give PRN hydralazine for SBP >140.   Pulm: humified face tent.   GI: soft diet, NPO after MN for angio tomorrow. LR@125  : theo britt TOANGELICA  ID: infected nasal packing: Vanc/Zosyn (1/27--), f/u bld cx result and OR cx result.   Endo: ISS  HEME: hold SQH due to epistaxis.   PPx: SCD  Lines: Sravani PAZ (1/27--)  Wounds: None

## 2018-01-28 NOTE — PROGRESS NOTE ADULT - SUBJECTIVE AND OBJECTIVE BOX
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  [place] with limited access to specialty care.  Patient was seen at multiple hospitals and was packed Tuesday 1/23 with good hemostasis.  Since that time patient has become increasingly lethargic and intermittently febrile.  Brought to the OR on 1/27 for packing removal.  Large volume of foul smelling packing removed from bilateral nasal cavities and nasopharynx, purulence noted throughout and draining from middle meatus b/l.  Nasal mucosa was macerated throughout but no clear source of bleeding identified.  Patient was extubated in OR but remains lethargic.  Admitted to SICU for close neuromonitoring and monitoring for bleeding.  1/28: Mental status improved. A&Ox3. no longer agitated. No bleeding from nose since OR. Spiked a fever to 102 degrees at 5am in the morning s/p pan culture. Was tachycardic and hypertensive over night which resolved in the morning. Currently on Vanc/zosyn.       MEDICATIONS:  Antiinfectives:   piperacillin/tazobactam IVPB. 3.375 Gram(s) IV Intermittent every 6 hours  vancomycin  IVPB 1250 milliGRAM(s) IV Intermittent every 12 hours    IV fluids:  dextrose 5%. 1000 milliLiter(s) IV Continuous <Continuous>  lactated ringers. 1000 milliLiter(s) IV Continuous <Continuous>    Pain medications/Neuro:  ondansetron Injectable 4 milliGRAM(s) IV Push every 6 hours PRN    Endocrine Medications:   dextrose 50% Injectable 12.5 Gram(s) IV Push once  dextrose 50% Injectable 25 Gram(s) IV Push once  dextrose Gel 1 Dose(s) Oral once PRN  glucagon  Injectable 1 milliGRAM(s) IntraMuscular once PRN  insulin lispro (HumaLOG) corrective regimen sliding scale   SubCutaneous every 6 hours    All other PRN medications:  metoprolol    tartrate Injectable 5 milliGRAM(s) IV Push every 6 hours PRN    Vital Signs Last 24 Hrs  T(C): 37.2 (28 Jan 2018 09:00), Max: 39.1 (28 Jan 2018 05:00)  T(F): 99 (28 Jan 2018 09:00), Max: 102.3 (28 Jan 2018 05:00)  HR: 92 (28 Jan 2018 08:00) (70 - 110)  BP: 149/74 (28 Jan 2018 01:00) (139/71 - 156/99)  BP(mean): 102 (28 Jan 2018 01:00) (92 - 115)  RR: 18 (28 Jan 2018 08:00) (16 - 29)  SpO2: 98% (28 Jan 2018 08:00) (93% - 100%)      01-27 @ 07:01  -  01-28 @ 07:00  --------------------------------------------------------  IN:    IV PiggyBack: 1050 mL    lactated ringers.: 850 mL  Total IN: 1900 mL    OUT:    Indwelling Catheter - Urethral: 2325 mL  Total OUT: 2325 mL    Total NET: -425 mL      01-28 @ 07:01  -  01-28 @ 10:16  --------------------------------------------------------  IN:  Total IN: 0 mL    OUT:    Voided: 130 mL  Total OUT: 130 mL    Total NET: -130 mL        PHYSICAL EXAM:  Gen: A&Ox3  Resp: non-labored breathing on oxygen rebreather mask, O2 sat stable in 90s  Nose: maceration of nasal sill b/l, bacitracin applied, no active bleeding  OC/OP: no active bleeding, no visible clots, uniformly dry and irritated oral mucosa    LABS:  CBC-             8.5    12.2  )-----------( 264      ( 28 Jan 2018 06:33 )             24.5     01-28    136  |  99  |  12  ----------------------------<  120<H>  4.0   |  24  |  0.88    Ca    8.3<L>      28 Jan 2018 06:33  Phos  2.6     01-28  Mg     2.0     01-28    TPro  7.3  /  Alb  3.6  /  TBili  0.9  /  DBili  x   /  AST  24  /  ALT  15  /  AlkPhos  50  01-27    Coagulation Studies-  PT/INR - ( 28 Jan 2018 06:33 )   PT: 13.6 sec;   INR: 1.22     PTT - ( 28 Jan 2018 06:33 )  PTT:24.6 sec        A/P:  72M w/ hx of recurrent epistaxis 2/2 to nasal AVM packed earlier this week for hemostasis and c/b fever and lethargy likely due to SIRs response from infected packing and resulting sinusitis  - Continue SICU monitoring  - q1h neurochecks  - respiratory monitoring: if breathing becomes labored or patient becomes more obtunded consider intubation   - continue broad spectrum antibiotics w/ vanc and zosyn  - f/u OR cultures  - f/u overnight cultures  - if patient rebleeds call ENT for emergent packing placement and emergent IR embolization, Neuro IR team aware  - epistaxis precautions: nasal saline sprays q4hs, bactroban to b/l nares qhs, avoid nasal manipulation, no nose blowing, open mouth sneezes, if supplemental oxygen required administer via face tent or face mask w/ humidified air  - plan for IR on Monday- ensure that patient is NPO at midnight  - Call ENT with questions or concerns  - Discussed with attending

## 2018-01-29 LAB
ANION GAP SERPL CALC-SCNC: 11 MMOL/L — SIGNIFICANT CHANGE UP (ref 5–17)
APTT BLD: 24.6 SEC — LOW (ref 27.5–37.4)
BUN SERPL-MCNC: 11 MG/DL — SIGNIFICANT CHANGE UP (ref 7–23)
CALCIUM SERPL-MCNC: 8.3 MG/DL — LOW (ref 8.4–10.5)
CHLORIDE SERPL-SCNC: 102 MMOL/L — SIGNIFICANT CHANGE UP (ref 96–108)
CO2 SERPL-SCNC: 27 MMOL/L — SIGNIFICANT CHANGE UP (ref 22–31)
CREAT SERPL-MCNC: 1.02 MG/DL — SIGNIFICANT CHANGE UP (ref 0.5–1.3)
CULTURE RESULTS: NO GROWTH — SIGNIFICANT CHANGE UP
GLUCOSE BLDC GLUCOMTR-MCNC: 201 MG/DL — HIGH (ref 70–99)
GLUCOSE BLDC GLUCOMTR-MCNC: 87 MG/DL — SIGNIFICANT CHANGE UP (ref 70–99)
GLUCOSE BLDC GLUCOMTR-MCNC: 91 MG/DL — SIGNIFICANT CHANGE UP (ref 70–99)
GLUCOSE SERPL-MCNC: 104 MG/DL — HIGH (ref 70–99)
GRAM STN FLD: SIGNIFICANT CHANGE UP
HCT VFR BLD CALC: 22.5 % — LOW (ref 39–50)
HGB BLD-MCNC: 7.4 G/DL — LOW (ref 13–17)
INR BLD: 1.14 — SIGNIFICANT CHANGE UP (ref 0.88–1.16)
LACTATE SERPL-SCNC: 1.3 MMOL/L — SIGNIFICANT CHANGE UP (ref 0.5–2)
MAGNESIUM SERPL-MCNC: 2 MG/DL — SIGNIFICANT CHANGE UP (ref 1.6–2.6)
MCHC RBC-ENTMCNC: 28.9 PG — SIGNIFICANT CHANGE UP (ref 27–34)
MCHC RBC-ENTMCNC: 32.9 G/DL — SIGNIFICANT CHANGE UP (ref 32–36)
MCV RBC AUTO: 87.9 FL — SIGNIFICANT CHANGE UP (ref 80–100)
PHOSPHATE SERPL-MCNC: 3 MG/DL — SIGNIFICANT CHANGE UP (ref 2.5–4.5)
PLATELET # BLD AUTO: 232 K/UL — SIGNIFICANT CHANGE UP (ref 150–400)
POTASSIUM SERPL-MCNC: 3.8 MMOL/L — SIGNIFICANT CHANGE UP (ref 3.5–5.3)
POTASSIUM SERPL-SCNC: 3.8 MMOL/L — SIGNIFICANT CHANGE UP (ref 3.5–5.3)
PROTHROM AB SERPL-ACNC: 12.7 SEC — SIGNIFICANT CHANGE UP (ref 9.8–12.7)
RBC # BLD: 2.56 M/UL — LOW (ref 4.2–5.8)
RBC # FLD: 12.7 % — SIGNIFICANT CHANGE UP (ref 10.3–16.9)
SODIUM SERPL-SCNC: 140 MMOL/L — SIGNIFICANT CHANGE UP (ref 135–145)
SPECIMEN SOURCE: SIGNIFICANT CHANGE UP
VANCOMYCIN TROUGH SERPL-MCNC: 13 UG/ML — SIGNIFICANT CHANGE UP (ref 10–20)
WBC # BLD: 7.7 K/UL — SIGNIFICANT CHANGE UP (ref 3.8–10.5)
WBC # FLD AUTO: 7.7 K/UL — SIGNIFICANT CHANGE UP (ref 3.8–10.5)

## 2018-01-29 PROCEDURE — 99233 SBSQ HOSP IP/OBS HIGH 50: CPT | Mod: GC

## 2018-01-29 RX ORDER — POLYETHYLENE GLYCOL 3350 17 G/17G
17 POWDER, FOR SOLUTION ORAL DAILY
Qty: 0 | Refills: 0 | Status: DISCONTINUED | OUTPATIENT
Start: 2018-01-29 | End: 2018-02-02

## 2018-01-29 RX ORDER — POTASSIUM CHLORIDE 20 MEQ
40 PACKET (EA) ORAL ONCE
Qty: 0 | Refills: 0 | Status: COMPLETED | OUTPATIENT
Start: 2018-01-29 | End: 2018-01-29

## 2018-01-29 RX ORDER — SODIUM CHLORIDE 9 MG/ML
1000 INJECTION, SOLUTION INTRAVENOUS
Qty: 0 | Refills: 0 | Status: DISCONTINUED | OUTPATIENT
Start: 2018-01-29 | End: 2018-01-29

## 2018-01-29 RX ORDER — VANCOMYCIN HCL 1 G
1250 VIAL (EA) INTRAVENOUS EVERY 12 HOURS
Qty: 0 | Refills: 0 | Status: DISCONTINUED | OUTPATIENT
Start: 2018-01-29 | End: 2018-02-02

## 2018-01-29 RX ADMIN — PIPERACILLIN AND TAZOBACTAM 200 GRAM(S): 4; .5 INJECTION, POWDER, LYOPHILIZED, FOR SOLUTION INTRAVENOUS at 12:05

## 2018-01-29 RX ADMIN — Medication 166.67 MILLIGRAM(S): at 06:07

## 2018-01-29 RX ADMIN — POLYETHYLENE GLYCOL 3350 17 GRAM(S): 17 POWDER, FOR SOLUTION ORAL at 16:43

## 2018-01-29 RX ADMIN — PIPERACILLIN AND TAZOBACTAM 200 GRAM(S): 4; .5 INJECTION, POWDER, LYOPHILIZED, FOR SOLUTION INTRAVENOUS at 18:00

## 2018-01-29 RX ADMIN — Medication 166.67 MILLIGRAM(S): at 18:53

## 2018-01-29 RX ADMIN — PIPERACILLIN AND TAZOBACTAM 200 GRAM(S): 4; .5 INJECTION, POWDER, LYOPHILIZED, FOR SOLUTION INTRAVENOUS at 06:06

## 2018-01-29 RX ADMIN — Medication 40 MILLIEQUIVALENT(S): at 12:05

## 2018-01-29 RX ADMIN — Medication 4: at 12:04

## 2018-01-29 NOTE — DIETITIAN INITIAL EVALUATION ADULT. - NS AS NUTRI INTERV MEALS SNACK
Continue current diet - pt able to meet needs with current diet, if PO intake < 50% of meals suggest trialing ONS

## 2018-01-29 NOTE — DIETITIAN INITIAL EVALUATION ADULT. - ENERGY NEEDS
89 kg ABW; 81 kg IBW; BMI 26; 110% of IBW; 183 cm;   ABW used due to pt between % of IBW.   Needs 2/2 advanced age & sepsis.

## 2018-01-29 NOTE — PROGRESS NOTE ADULT - SUBJECTIVE AND OBJECTIVE BOX
S: has baseline hearing loss, but c/o worsened of his hearing loss?? --- ENT is aware.   denies headaches, no nuchal rigidity. no other new med c/o.     O: ICU Vital Signs Last 24 Hrs  T(F): 98.7 (01-29-18 @ 09:50), Max: 101.1 (01-28-18 @ 15:24)  HR: 96 (01-29-18 @ 11:00) (70 - 118)  BP: 133/67 (01-29-18 @ 10:00) (83/47 - 140/75)  BP(mean): 77 (01-29-18 @ 10:00) (57 - 96)  ABP: 124/48 (01-29-18 @ 11:00)  RR: 16 (01-29-18 @ 11:00) (12 - 18)  SpO2: 95% (01-29-18 @ 11:00) (85% - 99%)    PHYSICAL EXAM:     Neurological: AAOx3, CNII-XII intact,  strength 5/5 b/l, no active bleeding from nares  Cardiovascular: RRR  Respiratory: CTA  Gastrointestinal: soft, NT, ND, BS+  Extremities: warm, no dependent edema  Vascular: no cyanosis/erythema    LABS:    01-29    140  |  102  |  11  ----------------------------<  104<H>  3.8   |  27  |  1.02    Ca    8.3<L>      29 Jan 2018 06:01  Phos  3.0     01-29  Mg     2.0     01-29    TPro  7.3  /  Alb  3.6  /  TBili  0.9  /  DBili  x   /  AST  24  /  ALT  15  /  AlkPhos  50  01-27  LIVER FUNCTIONS - ( 27 Jan 2018 15:42 )  Alb: 3.6 g/dL / Pro: 7.3 g/dL / ALK PHOS: 50 U/L / ALT: 15 U/L / AST: 24 U/L / GGT: x                               7.4    7.7   )-----------( 232      ( 29 Jan 2018 06:01 )             22.5   PT/INR - ( 29 Jan 2018 06:01 )   PT: 12.7 sec;   INR: 1.14          PTT - ( 29 Jan 2018 06:01 )  PTT:24.6 sec  ABG - ( 27 Jan 2018 22:33 )  pH: 7.36  /  pCO2: 39    /  pO2: 83    / HCO3: 21    / Base Excess: -3.9  /  SaO2: 95              Urinalysis Basic - ( 27 Jan 2018 16:30 )    Color: Yellow / Appearance: Clear / SG: >=1.030 / pH: x  Gluc: x / Ketone: Trace mg/dL  / Bili: Small / Urobili: 0.2 E.U./dL   Blood: x / Protein: NEGATIVE mg/dL / Nitrite: NEGATIVE   Leuk Esterase: NEGATIVE / RBC: x / WBC x   Sq Epi: x / Non Sq Epi: x / Bacteria: x    CAPILLARY BLOOD GLUCOSE      POCT Blood Glucose.: 91 mg/dL (29 Jan 2018 05:46)  POCT Blood Glucose.: 183 mg/dL (28 Jan 2018 21:46)  POCT Blood Glucose.: 123 mg/dL (28 Jan 2018 16:56)    MEDICATIONS  (STANDING):  dextrose 50% Injectable 12.5 Gram(s) IV Push once  insulin lispro (HumaLOG) corrective regimen sliding scale   SubCutaneous every 6 hours  piperacillin/tazobactam IVPB. 3.375 Gram(s) IV Intermittent every 6 hours  potassium chloride   Powder 40 milliEquivalent(s) Oral once  vancomycin  IVPB 1250 milliGRAM(s) IV Intermittent every 12 hours    MEDICATIONS  (PRN):  acetaminophen   Tablet 650 milliGRAM(s) Oral every 6 hours PRN For Temp greater than 38 C (100.4 F)  ondansetron Injectable 4 milliGRAM(s) IV Push every 6 hours PRN Nausea      [place]:	  [ ] None	[ ] Daily Reardon Order Placed	   Indication:	  [ ] Strict I and O's    [ ] Obstruction     [ ] Incontinence + Stage 3 or 4 Decubitus  Central Line:  [ ] None	   [ ]  Medication / TPN Administration     [ ] No Peripheral IV

## 2018-01-29 NOTE — DIETITIAN INITIAL EVALUATION ADULT. - OTHER INFO
72M w/ hx of recurrent epistaxis 2/2 to nasal AVM packed earlier this week for hemostasis and c/b fever and lethargy likely due to SIRs response from infected packing and resulting sinusitis. Pt's wife reports a good appetite both PTA & currently. States normal weight is 195-200#, however wt dropped to 191# yesterday. No n/v/d/c or chewing/swallowing reported. States pt is a picky eater however she is able to manage his diet & help with menu selection. RD offered menu.

## 2018-01-29 NOTE — PROGRESS NOTE ADULT - SUBJECTIVE AND OBJECTIVE BOX
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  [place] access to specialty care.  Patient was seen at multiple hospitals and was packed Tuesday 1/23 with good hemostasis.  Since that time patient has become increasingly lethargic and intermittently febrile.  Brought to the OR on 1/27 for packing removal.  Large volume of foul smelling packing removed from bilateral nasal cavities and nasopharynx, purulence noted throughout and draining from middle meatus b/l.  Nasal mucosa was macerated throughout but no clear source of bleeding identified.  Patient was extubated in OR but remains lethargic.  Admitted to SICU for close neuromonitoring and monitoring for bleeding.  1/28: Mental status improved. A&Ox3. no longer agitated. No bleeding from nose since OR. Spiked a fever to 102 degrees at 5am in the morning s/p pan culture. Was tachycardic and hypertensive over night which resolved in the morning. Currently on Vanc/zosyn.   1/29: Clinically stable. no bleeding noted. pending cx. AF since 1500.     Vital Signs Last 24 Hrs  T(C): 37.2 (29 Jan 2018 04:44), Max: 38.4 (28 Jan 2018 15:24)  T(F): 98.9 (29 Jan 2018 04:44), Max: 101.1 (28 Jan 2018 15:24)  HR: 76 (29 Jan 2018 07:00) (70 - 100)  BP: 134/76 (29 Jan 2018 07:00) (83/47 - 140/75)  BP(mean): 93 (29 Jan 2018 07:00) (57 - 94)  RR: 16 (29 Jan 2018 07:00) (12 - 18)  SpO2: 89% (29 Jan 2018 07:00) (85% - 99%)    PHYSICAL EXAM:  Gen: A&Ox3  Resp: non-labored breathing on oxygen rebreather mask, O2 sat stable in 90s  Nose: maceration of nasal sill b/l, bacitracin applied, no active bleeding  OC/OP: no active bleeding, no visible clots, uniformly dry and irritated oral mucosa                          7.4    7.7   )-----------( 232      ( 29 Jan 2018 06:01 )             22.5   01-29    140  |  102  |  11  ----------------------------<  104<H>  3.8   |  27  |  1.02    Ca    8.3<L>      29 Jan 2018 06:01  Phos  3.0     01-29  Mg     2.0     01-29    TPro  7.3  /  Alb  3.6  /  TBili  0.9  /  DBili  x   /  AST  24  /  ALT  15  /  AlkPhos  50  01-27    A/P:  72M w/ hx of recurrent epistaxis 2/2 to nasal AVM packed earlier this week for hemostasis and c/b fever and lethargy likely due to SIRs response from infected packing and resulting sinusitis  - Continue SICU monitoring  - q1h neurochecks  - respiratory monitoring: if breathing becomes labored or patient becomes more obtunded consider intubation   - continue broad spectrum antibiotics w/ vanc and zosyn  - f/u OR cultures  - if patient rebleeds call ENT for emergent packing placement and emergent IR embolization, Neuro IR team aware  - epistaxis precautions: nasal saline sprays q4hs, bactroban to b/l nares qhs, avoid nasal manipulation, no nose blowing, open mouth sneezes, if supplemental oxygen required administer via face tent or face mask w/ humidified air  - No plans for further IR intervention  - Dispo: Pt can be transfered to SDU  - Discharge pending cx  - Call ENT with questions or concerns  - Discussed with attending

## 2018-01-30 LAB
-  AMPICILLIN/SULBACTAM: SIGNIFICANT CHANGE UP
-  AMPICILLIN/SULBACTAM: SIGNIFICANT CHANGE UP
-  AMPICILLIN: SIGNIFICANT CHANGE UP
-  AMPICILLIN: SIGNIFICANT CHANGE UP
-  CEFAZOLIN: SIGNIFICANT CHANGE UP
-  CEFAZOLIN: SIGNIFICANT CHANGE UP
-  CEFTRIAXONE: SIGNIFICANT CHANGE UP
-  CEFTRIAXONE: SIGNIFICANT CHANGE UP
-  CIPROFLOXACIN: SIGNIFICANT CHANGE UP
-  CIPROFLOXACIN: SIGNIFICANT CHANGE UP
-  GENTAMICIN: SIGNIFICANT CHANGE UP
-  GENTAMICIN: SIGNIFICANT CHANGE UP
-  PIPERACILLIN/TAZOBACTAM: SIGNIFICANT CHANGE UP
-  PIPERACILLIN/TAZOBACTAM: SIGNIFICANT CHANGE UP
-  TOBRAMYCIN: SIGNIFICANT CHANGE UP
-  TOBRAMYCIN: SIGNIFICANT CHANGE UP
-  TRIMETHOPRIM/SULFAMETHOXAZOLE: SIGNIFICANT CHANGE UP
-  TRIMETHOPRIM/SULFAMETHOXAZOLE: SIGNIFICANT CHANGE UP
ANION GAP SERPL CALC-SCNC: 10 MMOL/L — SIGNIFICANT CHANGE UP (ref 5–17)
BLD GP AB SCN SERPL QL: NEGATIVE — SIGNIFICANT CHANGE UP
BUN SERPL-MCNC: 9 MG/DL — SIGNIFICANT CHANGE UP (ref 7–23)
CALCIUM SERPL-MCNC: 8.5 MG/DL — SIGNIFICANT CHANGE UP (ref 8.4–10.5)
CHLORIDE SERPL-SCNC: 101 MMOL/L — SIGNIFICANT CHANGE UP (ref 96–108)
CO2 SERPL-SCNC: 25 MMOL/L — SIGNIFICANT CHANGE UP (ref 22–31)
CREAT SERPL-MCNC: 0.91 MG/DL — SIGNIFICANT CHANGE UP (ref 0.5–1.3)
GLUCOSE BLDC GLUCOMTR-MCNC: 107 MG/DL — HIGH (ref 70–99)
GLUCOSE BLDC GLUCOMTR-MCNC: 125 MG/DL — HIGH (ref 70–99)
GLUCOSE BLDC GLUCOMTR-MCNC: 125 MG/DL — HIGH (ref 70–99)
GLUCOSE SERPL-MCNC: 120 MG/DL — HIGH (ref 70–99)
HCT VFR BLD CALC: 22.6 % — LOW (ref 39–50)
HGB BLD-MCNC: 8 G/DL — LOW (ref 13–17)
MAGNESIUM SERPL-MCNC: 1.9 MG/DL — SIGNIFICANT CHANGE UP (ref 1.6–2.6)
MCHC RBC-ENTMCNC: 29.5 PG — SIGNIFICANT CHANGE UP (ref 27–34)
MCHC RBC-ENTMCNC: 35.4 G/DL — SIGNIFICANT CHANGE UP (ref 32–36)
MCV RBC AUTO: 83.4 FL — SIGNIFICANT CHANGE UP (ref 80–100)
METHOD TYPE: SIGNIFICANT CHANGE UP
METHOD TYPE: SIGNIFICANT CHANGE UP
NIGHT BLUE STAIN TISS: SIGNIFICANT CHANGE UP
PHOSPHATE SERPL-MCNC: 3 MG/DL — SIGNIFICANT CHANGE UP (ref 2.5–4.5)
PLATELET # BLD AUTO: 265 K/UL — SIGNIFICANT CHANGE UP (ref 150–400)
POTASSIUM SERPL-MCNC: 4 MMOL/L — SIGNIFICANT CHANGE UP (ref 3.5–5.3)
POTASSIUM SERPL-SCNC: 4 MMOL/L — SIGNIFICANT CHANGE UP (ref 3.5–5.3)
RBC # BLD: 2.71 M/UL — LOW (ref 4.2–5.8)
RBC # FLD: 13.2 % — SIGNIFICANT CHANGE UP (ref 10.3–16.9)
RH IG SCN BLD-IMP: POSITIVE — SIGNIFICANT CHANGE UP
SODIUM SERPL-SCNC: 136 MMOL/L — SIGNIFICANT CHANGE UP (ref 135–145)
SPECIMEN SOURCE: SIGNIFICANT CHANGE UP
WBC # BLD: 7.8 K/UL — SIGNIFICANT CHANGE UP (ref 3.8–10.5)
WBC # FLD AUTO: 7.8 K/UL — SIGNIFICANT CHANGE UP (ref 3.8–10.5)

## 2018-01-30 PROCEDURE — 99231 SBSQ HOSP IP/OBS SF/LOW 25: CPT

## 2018-01-30 PROCEDURE — 99221 1ST HOSP IP/OBS SF/LOW 40: CPT | Mod: GC

## 2018-01-30 RX ORDER — SODIUM CHLORIDE 0.65 %
3 AEROSOL, SPRAY (ML) NASAL THREE TIMES A DAY
Qty: 0 | Refills: 0 | Status: DISCONTINUED | OUTPATIENT
Start: 2018-01-30 | End: 2018-02-02

## 2018-01-30 RX ORDER — METOPROLOL TARTRATE 50 MG
50 TABLET ORAL DAILY
Qty: 0 | Refills: 0 | Status: DISCONTINUED | OUTPATIENT
Start: 2018-01-30 | End: 2018-02-02

## 2018-01-30 RX ORDER — DIPHENHYDRAMINE HCL 50 MG
25 CAPSULE ORAL ONCE
Qty: 0 | Refills: 0 | Status: COMPLETED | OUTPATIENT
Start: 2018-01-30 | End: 2018-01-30

## 2018-01-30 RX ORDER — AMPICILLIN SODIUM AND SULBACTAM SODIUM 250; 125 MG/ML; MG/ML
3 INJECTION, POWDER, FOR SUSPENSION INTRAMUSCULAR; INTRAVENOUS EVERY 6 HOURS
Qty: 0 | Refills: 0 | Status: DISCONTINUED | OUTPATIENT
Start: 2018-01-30 | End: 2018-02-02

## 2018-01-30 RX ORDER — SODIUM CHLORIDE 9 MG/ML
1000 INJECTION INTRAMUSCULAR; INTRAVENOUS; SUBCUTANEOUS
Qty: 0 | Refills: 0 | Status: DISCONTINUED | OUTPATIENT
Start: 2018-01-31 | End: 2018-02-01

## 2018-01-30 RX ORDER — METOPROLOL TARTRATE 50 MG
5 TABLET ORAL ONCE
Qty: 0 | Refills: 0 | Status: COMPLETED | OUTPATIENT
Start: 2018-01-30 | End: 2018-01-30

## 2018-01-30 RX ORDER — SIMVASTATIN 20 MG/1
20 TABLET, FILM COATED ORAL AT BEDTIME
Qty: 0 | Refills: 0 | Status: DISCONTINUED | OUTPATIENT
Start: 2018-01-30 | End: 2018-02-02

## 2018-01-30 RX ORDER — LOSARTAN POTASSIUM 100 MG/1
50 TABLET, FILM COATED ORAL DAILY
Qty: 0 | Refills: 0 | Status: DISCONTINUED | OUTPATIENT
Start: 2018-01-30 | End: 2018-02-02

## 2018-01-30 RX ADMIN — LOSARTAN POTASSIUM 50 MILLIGRAM(S): 100 TABLET, FILM COATED ORAL at 11:52

## 2018-01-30 RX ADMIN — Medication 650 MILLIGRAM(S): at 20:01

## 2018-01-30 RX ADMIN — PIPERACILLIN AND TAZOBACTAM 200 GRAM(S): 4; .5 INJECTION, POWDER, LYOPHILIZED, FOR SOLUTION INTRAVENOUS at 12:46

## 2018-01-30 RX ADMIN — Medication 50 MILLIGRAM(S): at 11:52

## 2018-01-30 RX ADMIN — AMPICILLIN SODIUM AND SULBACTAM SODIUM 200 GRAM(S): 250; 125 INJECTION, POWDER, FOR SUSPENSION INTRAMUSCULAR; INTRAVENOUS at 21:57

## 2018-01-30 RX ADMIN — Medication 166.67 MILLIGRAM(S): at 18:34

## 2018-01-30 RX ADMIN — PIPERACILLIN AND TAZOBACTAM 200 GRAM(S): 4; .5 INJECTION, POWDER, LYOPHILIZED, FOR SOLUTION INTRAVENOUS at 00:30

## 2018-01-30 RX ADMIN — Medication 3 SPRAY(S): at 18:34

## 2018-01-30 RX ADMIN — PIPERACILLIN AND TAZOBACTAM 200 GRAM(S): 4; .5 INJECTION, POWDER, LYOPHILIZED, FOR SOLUTION INTRAVENOUS at 06:49

## 2018-01-30 RX ADMIN — Medication 3 SPRAY(S): at 22:22

## 2018-01-30 RX ADMIN — Medication 25 MILLIGRAM(S): at 23:54

## 2018-01-30 RX ADMIN — SIMVASTATIN 20 MILLIGRAM(S): 20 TABLET, FILM COATED ORAL at 22:22

## 2018-01-30 RX ADMIN — Medication 166.67 MILLIGRAM(S): at 06:49

## 2018-01-30 NOTE — CONSULT NOTE ADULT - SUBJECTIVE AND OBJECTIVE BOX
MEDICINE CONSULT NOTE    HPI:  HPI: 72M with hx of recurrent epistaxis 2/2 arterio-venous shunting requiring angio embolization x2, most recently R SPA angioembolization at [facility] 3 months ago.  Since that time patient's epistaxis have been well controlled without further episodes of bleeding.  [place] which resolved after 2 hours with pressure.   On a subsequent flight epistaxis recurred, lasting ~4 hours and patient was brought to hospital where packing was placed 1/23.  He was given Augmentin and as per wife has been compliant.  During hospital stay patient was given valium for anxiety and PRN medications for BP control.  As per wife has becoming more lethargic with low grade fevers during flights back to [facility].  No bleeding since packs were placed. (27 Jan 2018 16:32)        VITAL SIGNS:  T(F): 100.4 (01-30-18 @ 17:28)  HR: 80 (01-30-18 @ 17:29)  BP: 150/76 (01-30-18 @ 17:29)  RR: 19 (01-30-18 @ 17:29)  SpO2: 96% (01-30-18 @ 17:29)  Wt(kg): --  I&O's Summary    29 Jan 2018 07:01  -  30 Jan 2018 07:00  --------------------------------------------------------  IN: 770 mL / OUT: 1150 mL / NET: -380 mL    30 Jan 2018 07:01  -  30 Jan 2018 21:15  --------------------------------------------------------  IN: 1330 mL / OUT: 175 mL / NET: 1155 mL      PHYSICAL EXAM:  Constitutional: NAD, well-groomed, well-developed  HEENT: PERRLA, EOMI, Normal Hearing, MMM  Neck: No LAD, No JVD  Back: Normal spine flexure, No CVA tenderness  Respiratory: CTAB  Cardiovascular: S1 and S2, RRR, no M/G/R  Gastrointestinal: BS+, soft, NT/ND  Extremities: No peripheral edema  Vascular: 2+ peripheral pulses  Neurological: A/O x 3, no focal deficits  Psychiatric: Normal mood, normal affect  Musculoskeletal: 5/5 strength b/l upper and lower extremities  Skin: No rashes        MEDICATIONS  (STANDING):  ampicillin/sulbactam  IVPB 3 Gram(s) IV Intermittent every 6 hours  losartan 50 milliGRAM(s) Oral daily  metoprolol succinate ER 50 milliGRAM(s) Oral daily  simvastatin 20 milliGRAM(s) Oral at bedtime  sodium chloride 0.65% Nasal 3 Spray(s) Both Nostrils three times a day  vancomycin  IVPB 1250 milliGRAM(s) IV Intermittent every 12 hours    MEDICATIONS  (PRN):  acetaminophen   Tablet 650 milliGRAM(s) Oral every 6 hours PRN For Temp greater than 38 C (100.4 F)  ondansetron Injectable 4 milliGRAM(s) IV Push every 6 hours PRN Nausea  polyethylene glycol 3350 17 Gram(s) Oral daily PRN Constipation      Allergies    Vibramycin (Hives)    Intolerances        LABS:                        8.0    7.8   )-----------( 265      ( 30 Jan 2018 05:53 )             22.6     01-30    136  |  101  |  9   ----------------------------<  120<H>  4.0   |  25  |  0.91    Ca    8.5      30 Jan 2018 05:53  Phos  3.0     01-30  Mg     1.9     01-30      PT/INR - ( 29 Jan 2018 06:01 )   PT: 12.7 sec;   INR: 1.14          PTT - ( 29 Jan 2018 06:01 )  PTT:24.6 sec      RADIOLOGY & ADDITIONAL TESTS:        A/P:    General anesthesia on 1/27 w/o complications  RCRI 0 - 0.4% risk of major cardiac event  MET    Thank you for this consult. 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  Since that time patient's epistaxis have been well controlled without further episodes of bleeding.  [place] patient developed brisk epistaxis which resolved after 2 hours with pressure.   On a subsequent flight epistaxis recurred, lasting ~4 hours and patient was brought to hospital where packing was placed 1/23.  He was given Augmentin and as per wife has been compliant.  During hospital stay patient was given valium for anxiety and PRN medications for BP control.  As per wife has becoming more lethargic with low grade fevers during flights back to [facility].  No bleeding since packs were placed. (27 Jan 2018 16:32)    medicine being consulted for pre-op clearance for cerebral angiogram tomorrow, that is going to be done under general anesthesia. Of note pt already underwent a procedure under general anesthesia on 1/27 w/o adverse events.   Pt at the time reports no complaints he denies ever having any CP, SOB, ZIEGLER, no cardiac history reported. He says he was told 7 yrs ago he has a high calcium score by CT coronaries that was done by his doctor, but has never had any symptoms. No personal or fh of MIs, premature CAD. He reprots everyone in his family has trouble with diabetes and obesity.    VITAL SIGNS:  T(F): 100.4 (01-30-18 @ 17:28)  HR: 80 (01-30-18 @ 17:29)  BP: 150/76 (01-30-18 @ 17:29)  RR: 19 (01-30-18 @ 17:29)  SpO2: 96% (01-30-18 @ 17:29)  Wt(kg): --  I&O's Summary    29 Jan 2018 07:01  -  30 Jan 2018 07:00  --------------------------------------------------------  IN: 770 mL / OUT: 1150 mL / NET: -380 mL    30 Jan 2018 07:01  -  30 Jan 2018 21:15  --------------------------------------------------------  IN: 1330 mL / OUT: 175 mL / NET: 1155 mL      PHYSICAL EXAM:  Constitutional: NAD, well-developed  HEENT: PERRLA, EOMI, Normal Hearing, MMM, nose dried blood noted  Neck: No LAD, no JVD  Back: Normal spine flexure, No CVA tenderness  Respiratory: LLL crackles  Cardiovascular: S1 and S2, RRR, no M/G/R  Gastrointestinal: BS+, soft, NT/ND  Extremities: No peripheral edema  Vascular: 2+ peripheral pulses  Neurological: A/O x 3, no focal deficits  Psychiatric: Normal mood, normal affect  Musculoskeletal: 5/5 strength b/l upper and lower extremities  Skin: No rashes        MEDICATIONS  (STANDING):  ampicillin/sulbactam  IVPB 3 Gram(s) IV Intermittent every 6 hours  losartan 50 milliGRAM(s) Oral daily  metoprolol succinate ER 50 milliGRAM(s) Oral daily  simvastatin 20 milliGRAM(s) Oral at bedtime  sodium chloride 0.65% Nasal 3 Spray(s) Both Nostrils three times a day  vancomycin  IVPB 1250 milliGRAM(s) IV Intermittent every 12 hours    MEDICATIONS  (PRN):  acetaminophen   Tablet 650 milliGRAM(s) Oral every 6 hours PRN For Temp greater than 38 C (100.4 F)  ondansetron Injectable 4 milliGRAM(s) IV Push every 6 hours PRN Nausea  polyethylene glycol 3350 17 Gram(s) Oral daily PRN Constipation      Allergies    Vibramycin (Hives)    Intolerances        LABS:                        8.0    7.8   )-----------( 265      ( 30 Jan 2018 05:53 )             22.6     01-30    136  |  101  |  9   ----------------------------<  120<H>  4.0   |  25  |  0.91    Ca    8.5      30 Jan 2018 05:53  Phos  3.0     01-30  Mg     1.9     01-30      PT/INR - ( 29 Jan 2018 06:01 )   PT: 12.7 sec;   INR: 1.14          PTT - ( 29 Jan 2018 06:01 )  PTT:24.6 sec      RADIOLOGY & ADDITIONAL TESTS:  EKG:        A/P:  73 yo M with HTN, HLD, CAD, recurrent epistaxis will undergo cerebral angiogram tomorrow with . Pt reports having yearly echocardiograms done by his cardiologist, he can't explain it why but says his brother also gets them, I asked about FH of sudden death or any cardiac disease, which he denied. No Echo available to review. Pt reports his last echo was done 1 month ago and was normal. Of note he underwent a procedure under General anesthesia on 1/27 w/o complications.  Pt takes Cozar 50, Toprol XL 50 and simvastatin at home, which have been held in the hospital due to active infection.     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    Thank you for this consult. 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  Since that time patient's epistaxis have been well controlled without further episodes of bleeding.  [place] brisk epistaxis which resolved after 2 hours with pressure.   On a subsequent flight epistaxis recurred, lasting ~4 hours and patient was brought to hospital where packing was placed 1/23.  He was given Augmentin and as per wife has been compliant.  During hospital stay patient was given valium for anxiety and PRN medications for BP control.  As per wife has becoming more lethargic with low grade fevers during flights back to [facility].  No bleeding since packs were placed. (27 Jan 2018 16:32)    medicine being consulted for pre-op clearance for cerebral angiogram tomorrow, that is going to be done under general anesthesia. Of note pt already underwent a procedure under general anesthesia on 1/27 w/o adverse events.   Pt at the time reports no complaints he denies ever having any CP, SOB, ZIEGLER, no cardiac history reported. He says he was told 7 yrs ago he has a high calcium score by CT coronaries that was done by his doctor, but has never had any symptoms. No personal or fh of MIs, premature CAD. He reprots everyone in his family has trouble with diabetes and obesity.    VITAL SIGNS:  T(F): 100.4 (01-30-18 @ 17:28)  HR: 80 (01-30-18 @ 17:29)  BP: 150/76 (01-30-18 @ 17:29)  RR: 19 (01-30-18 @ 17:29)  SpO2: 96% (01-30-18 @ 17:29)  Wt(kg): --  I&O's Summary    29 Jan 2018 07:01  -  30 Jan 2018 07:00  --------------------------------------------------------  IN: 770 mL / OUT: 1150 mL / NET: -380 mL    30 Jan 2018 07:01  -  30 Jan 2018 21:15  --------------------------------------------------------  IN: 1330 mL / OUT: 175 mL / NET: 1155 mL      PHYSICAL EXAM:  Constitutional: NAD, well-developed  HEENT: PERRLA, EOMI, Normal Hearing, MMM, nose dried blood noted  Neck: No LAD, no JVD  Back: Normal spine flexure, No CVA tenderness  Respiratory: LLL crackles  Cardiovascular: S1 and S2, RRR, no M/G/R  Gastrointestinal: BS+, soft, NT/ND  Extremities: No peripheral edema  Vascular: 2+ peripheral pulses  Neurological: A/O x 3, no focal deficits  Psychiatric: Normal mood, normal affect  Musculoskeletal: 5/5 strength b/l upper and lower extremities  Skin: No rashes        MEDICATIONS  (STANDING):  ampicillin/sulbactam  IVPB 3 Gram(s) IV Intermittent every 6 hours  losartan 50 milliGRAM(s) Oral daily  metoprolol succinate ER 50 milliGRAM(s) Oral daily  simvastatin 20 milliGRAM(s) Oral at bedtime  sodium chloride 0.65% Nasal 3 Spray(s) Both Nostrils three times a day  vancomycin  IVPB 1250 milliGRAM(s) IV Intermittent every 12 hours    MEDICATIONS  (PRN):  acetaminophen   Tablet 650 milliGRAM(s) Oral every 6 hours PRN For Temp greater than 38 C (100.4 F)  ondansetron Injectable 4 milliGRAM(s) IV Push every 6 hours PRN Nausea  polyethylene glycol 3350 17 Gram(s) Oral daily PRN Constipation      Allergies    Vibramycin (Hives)    Intolerances        LABS:                        8.0    7.8   )-----------( 265      ( 30 Jan 2018 05:53 )             22.6     01-30    136  |  101  |  9   ----------------------------<  120<H>  4.0   |  25  |  0.91    Ca    8.5      30 Jan 2018 05:53  Phos  3.0     01-30  Mg     1.9     01-30      PT/INR - ( 29 Jan 2018 06:01 )   PT: 12.7 sec;   INR: 1.14          PTT - ( 29 Jan 2018 06:01 )  PTT:24.6 sec      RADIOLOGY & ADDITIONAL TESTS:  EKG: Sinus tachy 106/min, nl WI and QRS intervals no QW, ST changes, Normal axis        A/P:  71 yo M with HTN, HLD, CAD, recurrent epistaxis will undergo cerebral angiogram tomorrow with . Pt reports having yearly echocardiograms done by his cardiologist, he can't explain it why but says his brother also gets them, I asked about FH of sudden death or any cardiac disease, which he denied. No Echo available to review. Pt reports his last echo was done 1 month ago and was normal. Of note he underwent a procedure under General anesthesia on 1/27 w/o complications.  Pt takes Cozar 50, Toprol XL 50 and simvastatin at home, which have been held in the hospital due to active infection.     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    Recommend obtaining CXR given LLL crackles noted on exam to r/o infiltrate and possible developing PNA.    Thank you for this consult. 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 3 months ago.  Since that time patient's epistaxis have been well controlled without further episodes of bleeding.  [place] developed brisk epistaxis which resolved after 2 hours with pressure.   On a subsequent flight epistaxis recurred, lasting ~4 hours and patient was brought to hospital where packing was placed 1/23.  He was given Augmentin and as per wife has been compliant.  During hospital stay patient was given valium for anxiety and PRN medications for BP control.  As per wife has becoming more lethargic with low grade fevers during flights back to [facility].  No bleeding since packs were placed. (27 Jan 2018 16:32)    medicine being consulted for pre-op clearance for cerebral angiogram tomorrow, that is going to be done under general anesthesia. Of note pt already underwent a procedure under general anesthesia on 1/27 w/o adverse events.   Pt at the time reports no complaints he denies ever having any CP, SOB, ZIEGLER, no cardiac history reported. He says he was told 7 yrs ago he has a high calcium score by CT coronaries that was done by his doctor, but has never had any symptoms. No personal or fh of MIs, premature CAD. He reprots everyone in his family has trouble with diabetes and obesity.    VITAL SIGNS:  T(F): 100.4 (01-30-18 @ 17:28)  HR: 80 (01-30-18 @ 17:29)  BP: 150/76 (01-30-18 @ 17:29)  RR: 19 (01-30-18 @ 17:29)  SpO2: 96% (01-30-18 @ 17:29)  Wt(kg): --  I&O's Summary    29 Jan 2018 07:01  -  30 Jan 2018 07:00  --------------------------------------------------------  IN: 770 mL / OUT: 1150 mL / NET: -380 mL    30 Jan 2018 07:01  -  30 Jan 2018 21:15  --------------------------------------------------------  IN: 1330 mL / OUT: 175 mL / NET: 1155 mL      PHYSICAL EXAM:  Constitutional: NAD, well-developed  HEENT: PERRLA, EOMI, Normal Hearing, MMM, nose dried blood noted  Neck: No LAD, no JVD  Back: Normal spine flexure, No CVA tenderness  Respiratory: LLL crackles  Cardiovascular: S1 and S2, RRR, no M/G/R  Gastrointestinal: BS+, soft, NT/ND  Extremities: No peripheral edema  Vascular: 2+ peripheral pulses  Neurological: A/O x 3, no focal deficits  Psychiatric: Normal mood, normal affect  Musculoskeletal: 5/5 strength b/l upper and lower extremities  Skin: No rashes        MEDICATIONS  (STANDING):  ampicillin/sulbactam  IVPB 3 Gram(s) IV Intermittent every 6 hours  losartan 50 milliGRAM(s) Oral daily  metoprolol succinate ER 50 milliGRAM(s) Oral daily  simvastatin 20 milliGRAM(s) Oral at bedtime  sodium chloride 0.65% Nasal 3 Spray(s) Both Nostrils three times a day  vancomycin  IVPB 1250 milliGRAM(s) IV Intermittent every 12 hours    MEDICATIONS  (PRN):  acetaminophen   Tablet 650 milliGRAM(s) Oral every 6 hours PRN For Temp greater than 38 C (100.4 F)  ondansetron Injectable 4 milliGRAM(s) IV Push every 6 hours PRN Nausea  polyethylene glycol 3350 17 Gram(s) Oral daily PRN Constipation      Allergies    Vibramycin (Hives)    Intolerances        LABS:                        8.0    7.8   )-----------( 265      ( 30 Jan 2018 05:53 )             22.6     01-30    136  |  101  |  9   ----------------------------<  120<H>  4.0   |  25  |  0.91    Ca    8.5      30 Jan 2018 05:53  Phos  3.0     01-30  Mg     1.9     01-30      PT/INR - ( 29 Jan 2018 06:01 )   PT: 12.7 sec;   INR: 1.14          PTT - ( 29 Jan 2018 06:01 )  PTT:24.6 sec      RADIOLOGY & ADDITIONAL TESTS:  EKG: Sinus tachy 106/min, nl AR and QRS intervals no QW, ST changes, Normal axis        A/P:  71 yo M with HTN, HLD, CAD, recurrent epistaxis will undergo cerebral angiogram tomorrow with . Pt reports having yearly echocardiograms done by his cardiologist, he can't explain it why but says his brother also gets them, I asked about FH of sudden death or any cardiac disease, which he denied. No Echo available to review. Pt reports his last echo was done 1 month ago and was normal. Of note he underwent a procedure under General anesthesia on 1/27 w/o complications.   Despite active infection, pt last febrile yesterday at 5PM 100.4, Cx data showing pt is being covered with appropriate antibiotics and source controle has been obtained.    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    Recommend obtaining CXR given LLL crackles noted on exam to r/o infiltrate and possible developing PNA.    Thank you for this consult. 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  Since that time patient's epistaxis have been well controlled without further episodes of bleeding.  [place] epistaxis which resolved after 2 hours with pressure.   On a subsequent flight epistaxis recurred, lasting ~4 hours and patient was brought to hospital where packing was placed 1/23.  He was given Augmentin and as per wife has been compliant.  During hospital stay patient was given valium for anxiety and PRN medications for BP control.  As per wife has becoming more lethargic with low grade fevers during flights back to [facility].  No bleeding since packs were placed. (27 Jan 2018 16:32)    medicine being consulted for pre-op clearance for cerebral angiogram tomorrow, that is going to be done under general anesthesia. Of note pt already underwent a procedure under general anesthesia on 1/27 w/o adverse events.   Pt at the time reports no complaints he denies ever having any CP, SOB, ZIEGLER, no cardiac history reported. He says he was told 7 yrs ago he has a high calcium score by CT coronaries that was done by his doctor, but has never had any symptoms. No personal or fh of MIs, premature CAD. He reprots everyone in his family has trouble with diabetes and obesity.    VITAL SIGNS:  T(F): 100.4 (01-30-18 @ 17:28)  HR: 80 (01-30-18 @ 17:29)  BP: 150/76 (01-30-18 @ 17:29)  RR: 19 (01-30-18 @ 17:29)  SpO2: 96% (01-30-18 @ 17:29)  Wt(kg): --  I&O's Summary    29 Jan 2018 07:01  -  30 Jan 2018 07:00  --------------------------------------------------------  IN: 770 mL / OUT: 1150 mL / NET: -380 mL    30 Jan 2018 07:01  -  30 Jan 2018 21:15  --------------------------------------------------------  IN: 1330 mL / OUT: 175 mL / NET: 1155 mL      PHYSICAL EXAM:  Constitutional: NAD, well-developed  HEENT: PERRLA, EOMI, Normal Hearing, MMM, nose dried blood noted  Neck: No LAD, no JVD  Back: Normal spine flexure, No CVA tenderness  Respiratory: LLL crackles  Cardiovascular: S1 and S2, RRR, no M/G/R  Gastrointestinal: BS+, soft, NT/ND  Extremities: No peripheral edema  Vascular: 2+ peripheral pulses  Neurological: A/O x 3, no focal deficits  Psychiatric: Normal mood, normal affect  Musculoskeletal: 5/5 strength b/l upper and lower extremities  Skin: No rashes        MEDICATIONS  (STANDING):  ampicillin/sulbactam  IVPB 3 Gram(s) IV Intermittent every 6 hours  losartan 50 milliGRAM(s) Oral daily  metoprolol succinate ER 50 milliGRAM(s) Oral daily  simvastatin 20 milliGRAM(s) Oral at bedtime  sodium chloride 0.65% Nasal 3 Spray(s) Both Nostrils three times a day  vancomycin  IVPB 1250 milliGRAM(s) IV Intermittent every 12 hours    MEDICATIONS  (PRN):  acetaminophen   Tablet 650 milliGRAM(s) Oral every 6 hours PRN For Temp greater than 38 C (100.4 F)  ondansetron Injectable 4 milliGRAM(s) IV Push every 6 hours PRN Nausea  polyethylene glycol 3350 17 Gram(s) Oral daily PRN Constipation      Allergies    Vibramycin (Hives)    Intolerances        LABS:                        8.0    7.8   )-----------( 265      ( 30 Jan 2018 05:53 )             22.6     01-30    136  |  101  |  9   ----------------------------<  120<H>  4.0   |  25  |  0.91    Ca    8.5      30 Jan 2018 05:53  Phos  3.0     01-30  Mg     1.9     01-30      PT/INR - ( 29 Jan 2018 06:01 )   PT: 12.7 sec;   INR: 1.14          PTT - ( 29 Jan 2018 06:01 )  PTT:24.6 sec      RADIOLOGY & ADDITIONAL TESTS:  EKG: Sinus tachy 106/min, nl MA and QRS intervals no QW, ST changes, Normal axis        A/P:  71 yo M with HTN, HLD, CAD, recurrent epistaxis will undergo cerebral angiogram tomorrow with . Pt reports having yearly echocardiograms done by his cardiologist, he can't explain it why but says his brother also gets them, I asked about FH of sudden death or any cardiac disease, which he denied. No Echo available to review. Pt reports his last echo was done 1 month ago and was normal. Of note he underwent a procedure under General anesthesia on 1/27 w/o complications.   Despite active infection, pt last febrile yesterday at 5PM 100.4, Cx data showing pt is being covered with appropriate antibiotics and source controle has been obtained.    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    Recommend obtaining CXR given LLL crackles noted on exam to r/o infiltrate and possible developing PNA.    Thank you for this consult.

## 2018-01-30 NOTE — INPATIENT CERTIFICATION FOR MEDICARE PATIENTS - RISKS OF ADVERSE EVENTS
hemorrhage/Concern for neurologic deterioration/Concern for worsening infectious process/Concern for delay in diagnosis and treatment/Other:

## 2018-01-30 NOTE — PROGRESS NOTE ADULT - SUBJECTIVE AND OBJECTIVE BOX
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  [place] with limited access to specialty care.  Patient was seen at multiple hospitals and was packed Tuesday 1/23 with good hemostasis.  Since that time patient has become increasingly lethargic and intermittently febrile.  Brought to the OR on 1/27 for packing removal.  Large volume of foul smelling packing removed from bilateral nasal cavities and nasopharynx, purulence noted throughout and draining from middle meatus b/l.  Nasal mucosa was macerated throughout but no clear source of bleeding identified.  Patient was extubated in OR but remains lethargic.  Admitted to SICU for close neuromonitoring and monitoring for bleeding.  1/28: Mental status improved. A&Ox3. no longer agitated. No bleeding from nose since OR. Spiked a fever to 102 degrees at 5am in the morning s/p pan culture. Was tachycardic and hypertensive over night which resolved in the morning. Currently on Vanc/zosyn.   1/29: Clinically stable. no bleeding noted. pending cx. AF since 1500.   1/30: Clinically stable. Seen on SDU. no bleeding noted. Left hearing diminished    Vital Signs Last 24 Hrs  T(C): 37.6 (30 Jan 2018 05:00), Max: 38 (29 Jan 2018 14:50)  T(F): 99.7 (30 Jan 2018 05:00), Max: 100.4 (29 Jan 2018 14:50)  HR: 82 (30 Jan 2018 04:50) (82 - 118)  BP: 152/77 (30 Jan 2018 04:50) (109/60 - 152/77)  BP(mean): 107 (30 Jan 2018 04:50) (75 - 107)  RR: 18 (30 Jan 2018 04:50) (13 - 24)  SpO2: 94% (30 Jan 2018 04:50) (92% - 99%)      PHYSICAL EXAM:  Gen: A&Ox3  Resp: non-labored breathing on oxygen rebreather mask, O2 sat stable in 90s  Nose: maceration of nasal sill b/l, bacitracin applied, no active bleeding  OC/OP: no active bleeding, no visible clots, uniformly dry and irritated oral mucosa    A/P:  72M w/ hx of recurrent epistaxis 2/2 to nasal AVM packed earlier this week for hemostasis and c/b fever and lethargy likely due to SIRs response from infected packing and resulting sinusitis  - respiratory monitoring: if breathing becomes labored or patient becomes more obtunded consider intubation   - continue broad spectrum antibiotics w/ vanc and zosyn  - f/u OR cultures  - if patient rebleeds call ENT for emergent packing placement and emergent IR embolization, Neuro IR team aware  - epistaxis precautions: nasal saline sprays q4hs, bactroban to b/l nares qhs, avoid nasal manipulation, no nose blowing, open mouth sneezes, if supplemental oxygen required administer via face tent or face mask w/ humidified air  - IR w Dr Jorgensen tomorrow  - NPO from [place]  - Call ENT with questions or concerns  - Discussed with attending

## 2018-01-30 NOTE — PROGRESS NOTE ADULT - SUBJECTIVE AND OBJECTIVE BOX
Surgery: femoral cerebral angiogram embolization of nasal AVM with Dr. Jorgensen  Consent: Signed by patient: Finn Duckworth                   NAME/NUMBER of HCP: Sheila Castro (wife): [phone]    Vibramycin (Hives)    T(C): 36.3 (01-30-18 @ 14:00), Max: 37.8 (01-29-18 @ 18:29)  HR: 92 (01-30-18 @ 12:50) (80 - 96)  BP: 140/74 (01-30-18 @ 12:50) (119/61 - 170/81)  RR: 19 (01-30-18 @ 08:52) (18 - 23)  SpO2: 98% (01-30-18 @ 12:50) (94% - 98%)  Wt(kg): --    EXAM:  AOx3, NAD   CN II-XII intact  PERRL   Dried blood b/l nares- no active bleeding   Normal speech   Normal gait   Strength 5/5 b/l UE and LE   Sensation intact to light touch throughout   Unlabored breathing on RA   MAEx4    01-30    136  |  101  |  9   ----------------------------<  120<H>  4.0   |  25  |  0.91    Ca    8.5      30 Jan 2018 05:53  Phos  3.0     01-30  Mg     1.9     01-30      CBC Full  -  ( 30 Jan 2018 05:53 )  WBC Count : 7.8 K/uL  Hemoglobin : 8.0 g/dL  Hematocrit : 22.6 %  Platelet Count - Automated : 265 K/uL  Mean Cell Volume : 83.4 fL  Mean Cell Hemoglobin : 29.5 pg  Mean Cell Hemoglobin Concentration : 35.4 g/dL  Auto Neutrophil # : x  Auto Lymphocyte # : x  Auto Monocyte # : x  Auto Eosinophil # : x  Auto Basophil # : x  Auto Neutrophil % : x  Auto Lymphocyte % : x  Auto Monocyte % : x  Auto Eosinophil % : x  Auto Basophil % : x    PT/INR - ( 29 Jan 2018 06:01 )   PT: 12.7 sec;   INR: 1.14          PTT - ( 29 Jan 2018 06:01 )  PTT:24.6 sec    Pregnancy test: n/a  Type & Screen (in past 72hrs): B positive, 2nd set ordered for tomorrow am     CXR: Discoid change and/or infiltrates lung bases 1/27/18, stable CXR 1/28  EKG: pending  ECHO: n/a  Medical Clearances: n/a   Other Clearances: n/a    Last dose of antiplatelet/anticoagulation drug: n/a    Implanted Devices (pacemaker, drug pump...etc):  []YES   [x] NO                  If yes --> EPS consulted to interrogate/adjust device:                 Assessment: 72M hx   HPI: 72M hx HTN, HLD and DM s/p nasal AVM embolization x2 with recurrent bleeding and infection.     Plan:  -IR procedure tomorrow 1/31 with Dr. Jorgensen for femoral-cerebral angiogram with nasal AVM embolization   -Consent in chart   -NPO/IVF after midnight   -Repeat T&S   -EKG pending  -2 units pRBCs on hold   -No need for medical optimization per ENT   -D/w Dr. Jorgensen

## 2018-01-30 NOTE — PROGRESS NOTE ADULT - SUBJECTIVE AND OBJECTIVE BOX
Pt seen and examined at bedside. Denies current complaints. No active bleeding from nose, headache, salty taste in throat, fever or chills. Has been OOB today ambulating with wife. Plan for cerebral angiogram tomorrow am with Dr. Jorgensen, consent in chart. NPO/IVF @ midnight, pre-op labs. Will be ICU status on the neurosurgery service post-op. D/w Dr. Jorgensen. Pt seen and examined at bedside. Denies current complaints. No active bleeding from nose, headache, salty taste in throat, fever or chills. Has been OOB today ambulating with wife. Plan for cerebral angiogram tomorrow am with Dr. Jorgensen, consent in chart. NPO/IVF @ midnight, pre-op labs. Will likely be ICU status post-op. D/w Dr. Jorgensen.

## 2018-01-30 NOTE — PROGRESS NOTE ADULT - ASSESSMENT
Nasal AVM status post epistaxis and nasal packing that caused infection requiring removal of packing and IV antibiotics    Craniofacial angiogram and endovascular embolization of nasal AVM tomorrow.  The procedure, benefits and risks explained.

## 2018-01-31 LAB
-  AMPICILLIN/SULBACTAM: SIGNIFICANT CHANGE UP
-  AMPICILLIN/SULBACTAM: SIGNIFICANT CHANGE UP
-  AMPICILLIN: SIGNIFICANT CHANGE UP
-  AMPICILLIN: SIGNIFICANT CHANGE UP
-  CEFAZOLIN: SIGNIFICANT CHANGE UP
-  CEFAZOLIN: SIGNIFICANT CHANGE UP
-  CEFTRIAXONE: SIGNIFICANT CHANGE UP
-  CEFTRIAXONE: SIGNIFICANT CHANGE UP
-  CIPROFLOXACIN: SIGNIFICANT CHANGE UP
-  CIPROFLOXACIN: SIGNIFICANT CHANGE UP
-  GENTAMICIN: SIGNIFICANT CHANGE UP
-  GENTAMICIN: SIGNIFICANT CHANGE UP
-  PIPERACILLIN/TAZOBACTAM: SIGNIFICANT CHANGE UP
-  PIPERACILLIN/TAZOBACTAM: SIGNIFICANT CHANGE UP
-  TOBRAMYCIN: SIGNIFICANT CHANGE UP
-  TOBRAMYCIN: SIGNIFICANT CHANGE UP
-  TRIMETHOPRIM/SULFAMETHOXAZOLE: SIGNIFICANT CHANGE UP
-  TRIMETHOPRIM/SULFAMETHOXAZOLE: SIGNIFICANT CHANGE UP
ANION GAP SERPL CALC-SCNC: 15 MMOL/L — SIGNIFICANT CHANGE UP (ref 5–17)
BASOPHILS NFR BLD AUTO: 0.1 % — SIGNIFICANT CHANGE UP (ref 0–2)
BLD GP AB SCN SERPL QL: NEGATIVE — SIGNIFICANT CHANGE UP
BUN SERPL-MCNC: 15 MG/DL — SIGNIFICANT CHANGE UP (ref 7–23)
CALCIUM SERPL-MCNC: 8.9 MG/DL — SIGNIFICANT CHANGE UP (ref 8.4–10.5)
CHLORIDE SERPL-SCNC: 97 MMOL/L — SIGNIFICANT CHANGE UP (ref 96–108)
CO2 SERPL-SCNC: 21 MMOL/L — LOW (ref 22–31)
CREAT SERPL-MCNC: 1.06 MG/DL — SIGNIFICANT CHANGE UP (ref 0.5–1.3)
CULTURE RESULTS: SIGNIFICANT CHANGE UP
GLUCOSE SERPL-MCNC: 294 MG/DL — HIGH (ref 70–99)
HCT VFR BLD CALC: 26.8 % — LOW (ref 39–50)
HGB BLD-MCNC: 8.9 G/DL — LOW (ref 13–17)
LYMPHOCYTES # BLD AUTO: 5.6 % — LOW (ref 13–44)
MCHC RBC-ENTMCNC: 28.5 PG — SIGNIFICANT CHANGE UP (ref 27–34)
MCHC RBC-ENTMCNC: 33.2 G/DL — SIGNIFICANT CHANGE UP (ref 32–36)
MCV RBC AUTO: 85.9 FL — SIGNIFICANT CHANGE UP (ref 80–100)
METHOD TYPE: SIGNIFICANT CHANGE UP
METHOD TYPE: SIGNIFICANT CHANGE UP
MONOCYTES NFR BLD AUTO: 1.2 % — LOW (ref 2–14)
NEUTROPHILS NFR BLD AUTO: 93.1 % — HIGH (ref 43–77)
ORGANISM # SPEC MICROSCOPIC CNT: SIGNIFICANT CHANGE UP
PLATELET # BLD AUTO: 405 K/UL — HIGH (ref 150–400)
POTASSIUM SERPL-MCNC: 4.6 MMOL/L — SIGNIFICANT CHANGE UP (ref 3.5–5.3)
POTASSIUM SERPL-SCNC: 4.6 MMOL/L — SIGNIFICANT CHANGE UP (ref 3.5–5.3)
RBC # BLD: 3.12 M/UL — LOW (ref 4.2–5.8)
RBC # FLD: 12.7 % — SIGNIFICANT CHANGE UP (ref 10.3–16.9)
RH IG SCN BLD-IMP: POSITIVE — SIGNIFICANT CHANGE UP
SODIUM SERPL-SCNC: 133 MMOL/L — LOW (ref 135–145)
SPECIMEN SOURCE: SIGNIFICANT CHANGE UP
SURGICAL PATHOLOGY STUDY: SIGNIFICANT CHANGE UP
VANCOMYCIN TROUGH SERPL-MCNC: 17 UG/ML — SIGNIFICANT CHANGE UP (ref 10–20)
WBC # BLD: 9.4 K/UL — SIGNIFICANT CHANGE UP (ref 3.8–10.5)
WBC # FLD AUTO: 9.4 K/UL — SIGNIFICANT CHANGE UP (ref 3.8–10.5)

## 2018-01-31 PROCEDURE — 61626 TCAT PERM OCCLS/EMBOL NONCNS: CPT

## 2018-01-31 PROCEDURE — 75894 X-RAYS TRANSCATH THERAPY: CPT | Mod: 26

## 2018-01-31 PROCEDURE — 75898 FOLLOW-UP ANGIOGRAPHY: CPT | Mod: 26

## 2018-01-31 PROCEDURE — 36223 PLACE CATH CAROTID/INOM ART: CPT | Mod: 50

## 2018-01-31 PROCEDURE — 36227 PLACE CATH XTRNL CAROTID: CPT | Mod: 50

## 2018-01-31 RX ADMIN — Medication 166.67 MILLIGRAM(S): at 06:57

## 2018-01-31 RX ADMIN — Medication 3 SPRAY(S): at 06:58

## 2018-01-31 RX ADMIN — SIMVASTATIN 20 MILLIGRAM(S): 20 TABLET, FILM COATED ORAL at 23:12

## 2018-01-31 RX ADMIN — SODIUM CHLORIDE 85 MILLILITER(S): 9 INJECTION INTRAMUSCULAR; INTRAVENOUS; SUBCUTANEOUS at 15:34

## 2018-01-31 RX ADMIN — AMPICILLIN SODIUM AND SULBACTAM SODIUM 200 GRAM(S): 250; 125 INJECTION, POWDER, FOR SUSPENSION INTRAMUSCULAR; INTRAVENOUS at 03:53

## 2018-01-31 RX ADMIN — Medication 3 SPRAY(S): at 23:12

## 2018-01-31 RX ADMIN — AMPICILLIN SODIUM AND SULBACTAM SODIUM 200 GRAM(S): 250; 125 INJECTION, POWDER, FOR SUSPENSION INTRAMUSCULAR; INTRAVENOUS at 15:33

## 2018-01-31 RX ADMIN — LOSARTAN POTASSIUM 50 MILLIGRAM(S): 100 TABLET, FILM COATED ORAL at 06:58

## 2018-01-31 RX ADMIN — AMPICILLIN SODIUM AND SULBACTAM SODIUM 200 GRAM(S): 250; 125 INJECTION, POWDER, FOR SUSPENSION INTRAMUSCULAR; INTRAVENOUS at 09:18

## 2018-01-31 RX ADMIN — Medication 50 MILLIGRAM(S): at 06:57

## 2018-01-31 RX ADMIN — SODIUM CHLORIDE 85 MILLILITER(S): 9 INJECTION INTRAMUSCULAR; INTRAVENOUS; SUBCUTANEOUS at 00:01

## 2018-01-31 NOTE — PROGRESS NOTE ADULT - SUBJECTIVE AND OBJECTIVE BOX
72M with hx of recurrent epistaxis 2/2 nasal AVM requiring angio embolization x2, and R SPA angioembolization 3 months ago at [facility].  [place] with limited access to specialty care.  Patient was seen at multiple hospitals and was packed Tuesday 1/23 with good hemostasis.  Since that time patient has become increasingly lethargic and intermittently febrile.  Brought to the OR on 1/27 for packing removal.  Large volume of foul smelling packing removed from bilateral nasal cavities and nasopharynx, purulence noted throughout and draining from middle meatus b/l.  Nasal mucosa was macerated throughout but no clear source of bleeding identified.  Patient was extubated in OR but remains lethargic.  Admitted to SICU for close neuromonitoring and monitoring for bleeding.  1/28: Mental status improved. A&Ox3. no longer agitated. No bleeding from nose since OR. Spiked a fever to 102 degrees at 5am in the morning s/p pan culture. Was tachycardic and hypertensive over night which resolved in the morning. Currently on Vanc/zosyn.   1/29: Clinically stable. no bleeding noted. pending cx. AF since 1500.   1/30: Clinically stable. Seen on SDU. no bleeding noted. Left hearing diminished  1/31: HECTOR overnight. using nasal spray. NPO from [place]. medical clearance much appreciated. For IR angio/embo with neurosurg today.     PHYSICAL EXAM:  Gen: A&Ox3  Resp: non-labored breathing on oxygen rebreather mask, O2 sat stable in 90s  Nose: maceration of nasal sill b/l, bacitracin applied, no active bleeding  OC/OP: no active bleeding, no visible clots, uniformly dry and irritated oral mucosa    A/P:  72M w/ hx of recurrent epistaxis 2/2 to nasal AVM packed earlier this week for hemostasis and c/b fever and lethargy likely due to SIRs response from infected packing and resulting sinusitis  - respiratory monitoring: if breathing becomes labored or patient becomes more obtunded consider intubation   - continue broad spectrum antibiotics w/ vanc and unasyn  - f/u OR cultures  - if patient rebleeds call ENT for emergent packing placement and emergent IR embolization, Neuro IR team aware  - epistaxis precautions: nasal saline sprays q4hs, bactroban to b/l nares qhs, avoid nasal manipulation, no nose blowing, open mouth sneezes, if supplemental oxygen required administer via face tent or face mask w/ humidified air  - NPO  - Call ENT with questions or concerns  - Discussed with attending

## 2018-01-31 NOTE — BRIEF OPERATIVE NOTE - PROCEDURE
<<-----Click on this checkbox to enter Procedure Cerebral angiography  01/31/2018  Craniofacial angiography  Active  ELISHA

## 2018-01-31 NOTE — BRIEF OPERATIVE NOTE - OPERATION/FINDINGS
macerated mucosa bilaterally, purulent drainage from middle meatus b/l, foul smelling drainage
Under GA sedation, using a 4 fr sheath right CFA, craniofacial angiography and endovascular embolization of R distal facial artery AVM was performed using embosphere and glue. Findings: No aneurysm, or early venous shunting.  Full report to follow.  Patient tolerated procedure well, no new neurological deficit, hemodynamically stable.  Right groin/vasc access site: Perclose closure, hemostasis achieved, no hematoma.  Patient was transferred to PACU and will be transferred to SDU.

## 2018-01-31 NOTE — BRIEF OPERATIVE NOTE - POST-OP DX
Nasal hemorrhage  01/27/2018    Active  Toña Miguel
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

## 2018-01-31 NOTE — BRIEF OPERATIVE NOTE - PRE-OP DX
Nasal hemorrhage  01/27/2018    Active  Toña Miguel
AVM (arteriovenous malformation)  01/31/2018    Active  Andrzej Hernández  Nasal hemorrhage  01/27/2018    Active  Toña Miguel

## 2018-01-31 NOTE — PROGRESS NOTE ADULT - SUBJECTIVE AND OBJECTIVE BOX
NEUROSURGERY POST OP NOTE:    POD# 0 S/P endovascular embolization of distal facial artery AVM    S: Patient is doing well post op. Extubated, awake and alert. Denies CP, SOB, HA, pain or change in vision.       T(C): 37.2 (01-31-18 @ 10:09), Max: 37.3 (01-31-18 @ 05:04)  HR: 72 (01-31-18 @ 08:48) (64 - 75)  BP: 151/77 (01-31-18 @ 08:48) (135/73 - 151/77)  RR: 18 (01-31-18 @ 08:48) (16 - 18)  SpO2: 95% (01-31-18 @ 10:09) (94% - 97%)      01-30-18 @ 07:01  -  01-31-18 @ 07:00  --------------------------------------------------------  IN: 1330 mL / OUT: 1625 mL / NET: -295 mL    01-31-18 @ 07:01  -  01-31-18 @ 19:16  --------------------------------------------------------  IN: 270 mL / OUT: 0 mL / NET: 270 mL        acetaminophen   Tablet 650 milliGRAM(s) Oral every 6 hours PRN  ampicillin/sulbactam  IVPB 3 Gram(s) IV Intermittent every 6 hours  losartan 50 milliGRAM(s) Oral daily  metoprolol succinate ER 50 milliGRAM(s) Oral daily  ondansetron Injectable 4 milliGRAM(s) IV Push every 6 hours PRN  polyethylene glycol 3350 17 Gram(s) Oral daily PRN  simvastatin 20 milliGRAM(s) Oral at bedtime  sodium chloride 0.65% Nasal 3 Spray(s) Both Nostrils three times a day  sodium chloride 0.9%. 1000 milliLiter(s) IV Continuous <Continuous>  vancomycin  IVPB 1250 milliGRAM(s) IV Intermittent every 12 hours      RADIOLOGY:     Exam:  Gen: Laying comfortably on hospital bed, hard of hearing at baseline  Neuro: AA+Ox3, following commands  CN II-XII: grossly intact, left eyelid strength decreased, right pupil slightly larger than left, reactive b/l, EOMI. No visual field deficits.   MAEx4, 5/5 strength in UE b/l, 5/5 strength in LLE, 4+/5 strength in RLE. No drift.   Cardio: regular rate and rhythm  Pulm: clear to auscultation  abdomen: soft, nontender, nondistended  WOUND/DRAINS: R groin C/D/I, no hematoma   extremities: L DP +2, R DP no pulse palpable, warm to touch b/l         Assessment: 72y Male with PMH epistaxis s/p craniofacial angiogram with endovascular embolization of distal facial artery AVM  POD #0    Plan:  - Neuro checks Q2 hours  - continue abx as per ENT  - pain control with Tylenol  - IVF  - bowel regimen: miralax   - HOB at 30 degrees  - groin site checks  - SBP goal

## 2018-02-01 ENCOUNTER — TRANSCRIPTION ENCOUNTER (OUTPATIENT)
Age: 73
End: 2018-02-01

## 2018-02-01 LAB
ANION GAP SERPL CALC-SCNC: 12 MMOL/L — SIGNIFICANT CHANGE UP (ref 5–17)
BUN SERPL-MCNC: 16 MG/DL — SIGNIFICANT CHANGE UP (ref 7–23)
CALCIUM SERPL-MCNC: 8.6 MG/DL — SIGNIFICANT CHANGE UP (ref 8.4–10.5)
CHLORIDE SERPL-SCNC: 101 MMOL/L — SIGNIFICANT CHANGE UP (ref 96–108)
CO2 SERPL-SCNC: 24 MMOL/L — SIGNIFICANT CHANGE UP (ref 22–31)
CREAT SERPL-MCNC: 0.97 MG/DL — SIGNIFICANT CHANGE UP (ref 0.5–1.3)
CULTURE RESULTS: SIGNIFICANT CHANGE UP
GLUCOSE BLDC GLUCOMTR-MCNC: 128 MG/DL — HIGH (ref 70–99)
GLUCOSE SERPL-MCNC: 125 MG/DL — HIGH (ref 70–99)
HCT VFR BLD CALC: 25.1 % — LOW (ref 39–50)
HGB BLD-MCNC: 8.4 G/DL — LOW (ref 13–17)
MAGNESIUM SERPL-MCNC: 2.2 MG/DL — SIGNIFICANT CHANGE UP (ref 1.6–2.6)
MCHC RBC-ENTMCNC: 28.6 PG — SIGNIFICANT CHANGE UP (ref 27–34)
MCHC RBC-ENTMCNC: 33.5 G/DL — SIGNIFICANT CHANGE UP (ref 32–36)
MCV RBC AUTO: 85.4 FL — SIGNIFICANT CHANGE UP (ref 80–100)
PHOSPHATE SERPL-MCNC: 3 MG/DL — SIGNIFICANT CHANGE UP (ref 2.5–4.5)
PLATELET # BLD AUTO: 375 K/UL — SIGNIFICANT CHANGE UP (ref 150–400)
POTASSIUM SERPL-MCNC: 4.2 MMOL/L — SIGNIFICANT CHANGE UP (ref 3.5–5.3)
POTASSIUM SERPL-SCNC: 4.2 MMOL/L — SIGNIFICANT CHANGE UP (ref 3.5–5.3)
RBC # BLD: 2.94 M/UL — LOW (ref 4.2–5.8)
RBC # FLD: 12.5 % — SIGNIFICANT CHANGE UP (ref 10.3–16.9)
SODIUM SERPL-SCNC: 137 MMOL/L — SIGNIFICANT CHANGE UP (ref 135–145)
WBC # BLD: 9.3 K/UL — SIGNIFICANT CHANGE UP (ref 3.8–10.5)
WBC # FLD AUTO: 9.3 K/UL — SIGNIFICANT CHANGE UP (ref 3.8–10.5)

## 2018-02-01 PROCEDURE — 99233 SBSQ HOSP IP/OBS HIGH 50: CPT | Mod: GC

## 2018-02-01 PROCEDURE — 99222 1ST HOSP IP/OBS MODERATE 55: CPT | Mod: GC

## 2018-02-01 RX ORDER — SODIUM CHLORIDE 0.65 %
1 AEROSOL, SPRAY (ML) NASAL
Qty: 0 | Refills: 0 | Status: DISCONTINUED | OUTPATIENT
Start: 2018-02-01 | End: 2018-02-02

## 2018-02-01 RX ORDER — INSULIN LISPRO 100/ML
VIAL (ML) SUBCUTANEOUS
Qty: 0 | Refills: 0 | Status: DISCONTINUED | OUTPATIENT
Start: 2018-02-01 | End: 2018-02-02

## 2018-02-01 RX ORDER — DEXTROSE 50 % IN WATER 50 %
25 SYRINGE (ML) INTRAVENOUS ONCE
Qty: 0 | Refills: 0 | Status: DISCONTINUED | OUTPATIENT
Start: 2018-02-01 | End: 2018-02-01

## 2018-02-01 RX ORDER — DEXTROSE 50 % IN WATER 50 %
12.5 SYRINGE (ML) INTRAVENOUS ONCE
Qty: 0 | Refills: 0 | Status: DISCONTINUED | OUTPATIENT
Start: 2018-02-01 | End: 2018-02-01

## 2018-02-01 RX ORDER — DEXTROSE 50 % IN WATER 50 %
1 SYRINGE (ML) INTRAVENOUS ONCE
Qty: 0 | Refills: 0 | Status: DISCONTINUED | OUTPATIENT
Start: 2018-02-01 | End: 2018-02-01

## 2018-02-01 RX ORDER — SODIUM CHLORIDE 9 MG/ML
1000 INJECTION, SOLUTION INTRAVENOUS
Qty: 0 | Refills: 0 | Status: DISCONTINUED | OUTPATIENT
Start: 2018-02-01 | End: 2018-02-01

## 2018-02-01 RX ORDER — GLUCAGON INJECTION, SOLUTION 0.5 MG/.1ML
1 INJECTION, SOLUTION SUBCUTANEOUS ONCE
Qty: 0 | Refills: 0 | Status: DISCONTINUED | OUTPATIENT
Start: 2018-02-01 | End: 2018-02-01

## 2018-02-01 RX ADMIN — Medication 3 SPRAY(S): at 13:55

## 2018-02-01 RX ADMIN — AMPICILLIN SODIUM AND SULBACTAM SODIUM 200 GRAM(S): 250; 125 INJECTION, POWDER, FOR SUSPENSION INTRAMUSCULAR; INTRAVENOUS at 08:01

## 2018-02-01 RX ADMIN — POLYETHYLENE GLYCOL 3350 17 GRAM(S): 17 POWDER, FOR SOLUTION ORAL at 11:49

## 2018-02-01 RX ADMIN — AMPICILLIN SODIUM AND SULBACTAM SODIUM 200 GRAM(S): 250; 125 INJECTION, POWDER, FOR SUSPENSION INTRAMUSCULAR; INTRAVENOUS at 00:09

## 2018-02-01 RX ADMIN — Medication 3 SPRAY(S): at 22:01

## 2018-02-01 RX ADMIN — AMPICILLIN SODIUM AND SULBACTAM SODIUM 200 GRAM(S): 250; 125 INJECTION, POWDER, FOR SUSPENSION INTRAMUSCULAR; INTRAVENOUS at 13:55

## 2018-02-01 RX ADMIN — Medication 3 SPRAY(S): at 08:00

## 2018-02-01 RX ADMIN — Medication 166.67 MILLIGRAM(S): at 02:23

## 2018-02-01 RX ADMIN — Medication 50 MILLIGRAM(S): at 07:56

## 2018-02-01 RX ADMIN — LOSARTAN POTASSIUM 50 MILLIGRAM(S): 100 TABLET, FILM COATED ORAL at 07:56

## 2018-02-01 RX ADMIN — SIMVASTATIN 20 MILLIGRAM(S): 20 TABLET, FILM COATED ORAL at 22:01

## 2018-02-01 RX ADMIN — AMPICILLIN SODIUM AND SULBACTAM SODIUM 200 GRAM(S): 250; 125 INJECTION, POWDER, FOR SUSPENSION INTRAMUSCULAR; INTRAVENOUS at 19:36

## 2018-02-01 RX ADMIN — Medication 1 APPLICATION(S): at 19:36

## 2018-02-01 RX ADMIN — Medication 166.67 MILLIGRAM(S): at 15:05

## 2018-02-01 NOTE — PROGRESS NOTE ADULT - SUBJECTIVE AND OBJECTIVE BOX
OVERNIGHT EVENTS: Now s/p craniofacial angiogram with endovascular embolization of distal facial artery AVM    SUBJECTIVE / INTERVAL HPI: Patient seen and examined at bedside.     VITAL SIGNS:  Vital Signs Last 24 Hrs  T(C): 36.7 (01 Feb 2018 05:00), Max: 37.2 (31 Jan 2018 10:09)  T(F): 98.1 (01 Feb 2018 05:00), Max: 98.9 (31 Jan 2018 10:09)  HR: 94 (01 Feb 2018 08:32) (88 - 125)  BP: 126/73 (01 Feb 2018 08:32) (117/66 - 137/78)  BP(mean): 94 (01 Feb 2018 08:32) (83 - 101)  RR: 16 (01 Feb 2018 08:32) (15 - 16)  SpO2: 99% (01 Feb 2018 08:32) (95% - 99%)    PHYSICAL EXAM:    MEDICATIONS:  MEDICATIONS  (STANDING):  ampicillin/sulbactam  IVPB 3 Gram(s) IV Intermittent every 6 hours  dextrose 5%. 1000 milliLiter(s) (50 mL/Hr) IV Continuous <Continuous>  dextrose 50% Injectable 12.5 Gram(s) IV Push once  dextrose 50% Injectable 25 Gram(s) IV Push once  dextrose 50% Injectable 25 Gram(s) IV Push once  insulin lispro (HumaLOG) corrective regimen sliding scale   SubCutaneous Before meals and at bedtime  losartan 50 milliGRAM(s) Oral daily  metoprolol succinate ER 50 milliGRAM(s) Oral daily  simvastatin 20 milliGRAM(s) Oral at bedtime  sodium chloride 0.65% Nasal 3 Spray(s) Both Nostrils three times a day  vancomycin  IVPB 1250 milliGRAM(s) IV Intermittent every 12 hours    MEDICATIONS  (PRN):  acetaminophen   Tablet 650 milliGRAM(s) Oral every 6 hours PRN For Temp greater than 38 C (100.4 F)  dextrose Gel 1 Dose(s) Oral once PRN Blood Glucose LESS THAN 70 milliGRAM(s)/deciliter  glucagon  Injectable 1 milliGRAM(s) IntraMuscular once PRN Glucose LESS THAN 70 milligrams/deciliter  ondansetron Injectable 4 milliGRAM(s) IV Push every 6 hours PRN Nausea  polyethylene glycol 3350 17 Gram(s) Oral daily PRN Constipation      ALLERGIES:  Vibramycin (Hives)      LABS:                        8.4    9.3   )-----------( 375      ( 01 Feb 2018 06:07 )             25.1     02-01    137  |  101  |  16  ----------------------------<  125<H>  4.2   |  24  |  0.97    Ca    8.6      01 Feb 2018 06:07  Phos  3.0     02-01  Mg     2.2     02-01      CAPILLARY BLOOD GLUCOSE  POCT Blood Glucose.: 128 mg/dL (01 Feb 2018 08:04)      RADIOLOGY & ADDITIONAL TESTS: Reviewed.    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  1. Epistaxis - s/p embolization of distal facial artery AVM, POD 1. On Vancomycin and Unasyn pos-operatively     2. HTN - blood pressure currently well controlled, continue Losartan 50mg daily and Toprol XL 50mg daily    3. HLD - continue home dose of Simvastatin 20mg daily     4. Anemia - Hb around 7-9, currently at baseline  -Maintain active type and screen OVERNIGHT EVENTS: Now s/p craniofacial angiogram with endovascular embolization of distal facial artery AVM    SUBJECTIVE / INTERVAL HPI: Patient seen and examined at bedside. Patient reports feeling well, asking about discharge home. Denies any headaches, dizziness, lightheadedness, SOB, CP, epistaxis since procedure. Pain well controlled. No BM since prior to procedure however states he has not eaten much.     VITAL SIGNS:  Vital Signs Last 24 Hrs  T(C): 36.7 (01 Feb 2018 05:00), Max: 37.2 (31 Jan 2018 10:09)  T(F): 98.1 (01 Feb 2018 05:00), Max: 98.9 (31 Jan 2018 10:09)  HR: 94 (01 Feb 2018 08:32) (88 - 125)  BP: 126/73 (01 Feb 2018 08:32) (117/66 - 137/78)  BP(mean): 94 (01 Feb 2018 08:32) (83 - 101)  RR: 16 (01 Feb 2018 08:32) (15 - 16)  SpO2: 99% (01 Feb 2018 08:32) (95% - 99%)    PHYSICAL EXAM:  Gen: well developed, well nourished, resting comfortably in bed  HEENT: moist mucus membranes, EOMI, PERRLA, sclera nonicteric. No nasal bleeding seen  CV: regular rate and rhythm, +S1/S2, no murmurs  Resp: clear to auscultation bilaterally, no rales/rhonchi/wheezing  Abd: soft, nontender to palpation, +BS  Extr: warm and well perfused, non-edematous, distal pulses palpable. Groin without hematoma, dressing now removed  Neuro: alert and oriented to person, place and time; good fund of knowledge with appropriate affect; cranial nerves - EOMI, PERRLA, face symmetric, tongue midline; motor with good tone and bulk, sensory intact throughout    MEDICATIONS:  MEDICATIONS  (STANDING):  ampicillin/sulbactam  IVPB 3 Gram(s) IV Intermittent every 6 hours  dextrose 5%. 1000 milliLiter(s) (50 mL/Hr) IV Continuous <Continuous>  dextrose 50% Injectable 12.5 Gram(s) IV Push once  dextrose 50% Injectable 25 Gram(s) IV Push once  dextrose 50% Injectable 25 Gram(s) IV Push once  insulin lispro (HumaLOG) corrective regimen sliding scale   SubCutaneous Before meals and at bedtime  losartan 50 milliGRAM(s) Oral daily  metoprolol succinate ER 50 milliGRAM(s) Oral daily  simvastatin 20 milliGRAM(s) Oral at bedtime  sodium chloride 0.65% Nasal 3 Spray(s) Both Nostrils three times a day  vancomycin  IVPB 1250 milliGRAM(s) IV Intermittent every 12 hours    MEDICATIONS  (PRN):  acetaminophen   Tablet 650 milliGRAM(s) Oral every 6 hours PRN For Temp greater than 38 C (100.4 F)  dextrose Gel 1 Dose(s) Oral once PRN Blood Glucose LESS THAN 70 milliGRAM(s)/deciliter  glucagon  Injectable 1 milliGRAM(s) IntraMuscular once PRN Glucose LESS THAN 70 milligrams/deciliter  ondansetron Injectable 4 milliGRAM(s) IV Push every 6 hours PRN Nausea  polyethylene glycol 3350 17 Gram(s) Oral daily PRN Constipation      ALLERGIES:  Vibramycin (Hives)      LABS:                        8.4    9.3   )-----------( 375      ( 01 Feb 2018 06:07 )             25.1     02-01    137  |  101  |  16  ----------------------------<  125<H>  4.2   |  24  |  0.97    Ca    8.6      01 Feb 2018 06:07  Phos  3.0     02-01  Mg     2.2     02-01      CAPILLARY BLOOD GLUCOSE  POCT Blood Glucose.: 128 mg/dL (01 Feb 2018 08:04)      RADIOLOGY & ADDITIONAL TESTS: Reviewed.    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  1. Epistaxis - s/p embolization of distal facial artery AVM, POD 1. On Vancomycin and Unasyn pos-operatively   -Discharge planning per NSG/ENT, for continued abx treatment, ID consulted for further recs, cultures growing pan-sensitive Klebsiella  -Pain is well controlled at this time  -No ppx given recent bleeding  -Miralax for constipation    2. HTN - blood pressure currently well controlled, continue Losartan 50mg daily and Toprol XL 50mg daily    3. HLD - continue home dose of Simvastatin 20mg daily     4. Anemia - Hb around 7-9, currently at baseline  -Maintain active type and screen    Discussed with Dr Gates

## 2018-02-01 NOTE — DISCHARGE NOTE ADULT - PLAN OF CARE
maintain activities of daily living Return to ER or call your doctor if notice any bleeding from nose, fever > 101F, chills or n/v  Continue taking antibiotics   Call Dr. Jorgensen (Neuroendovascular) to make a follow up appointment   Call Dr. Louis (Otolaryngology) to make a follow up appoinemtn Return to ER or call your doctor if notice any bleeding from nose, fever > 101F, chills or n/v  Continue taking antibiotics   Call Dr. Jorgensen (Neuroendovascular) to make a follow up appointment   Call Dr. Louis (Otolaryngology) to make a follow up appointment Return to ER or call your doctor if notice any bleeding from nose, fever > 101F, chills or n/v  Continue taking antibiotics   Call Dr. Jorgensen (Neuroendovascular) to make a follow up appointment   Call Dr. Louis (Otolaryngology) to make a follow up appointment  Augmentin for total of 10 days Return to ER or call your doctor if notice any bleeding from nose, fever > 101F, chills or n/v  Continue taking antibiotics   Call Dr. Jorgensen (Neuroendovascular) to make a follow up appointment   Call Dr. Louis (Otolaryngology) to make a follow up appointment  Augmentin for total of 10 days  DF/U 3pm 2/9/2018 ENT

## 2018-02-01 NOTE — DISCHARGE NOTE ADULT - OTHER SIGNIFICANT FINDINGS
HPI: 72M with hx of recurrent epistaxis 2/2 arterio-venous shunting requiring angio embolization x2, most recently R SPA angioembolization at [facility] 3 months ago.  Since that time patient's epistaxis have been well controlled without further episodes of bleeding.  [place] brisk epistaxis which resolved after 2 hours with pressure.   On a subsequent flight epistaxis recurred, lasting ~4 hours and patient was brought to hospital where packing was placed 1/23.  He was given Augmentin and as per wife has been compliant.  During hospital stay patient was given valium for anxiety and PRN medications for BP control.  As per wife has becoming more lethargic with low grade fevers during flights back to [facility].  No bleeding since packs were placed.    On 1/27, pt went to OR with ENT and underwent endoscopic nasal cauterization and removal of nasal packing. Pt was started on broad spectrum antibiotics- vanco and zosyn.     On 1/31, pt went to cath lab and underwent craniofacial angiogram for embolization of AVM, secondary to AV shunting of R distal facial artery. Pt antibiotics changed to vanco and unasyn.     On 2/1 OR cultures from 1/27 came back as Klebsiella. ID was consulted-    Pt medically optimized for discharge. Pt stable for discharge home today. HPI: 72M with hx of recurrent epistaxis 2/2 arterio-venous shunting requiring angio embolization x2, most recently R SPA angioembolization at [facility] 3 months ago.  Since that time patient's epistaxis have been well controlled without further episodes of bleeding.  [place] patient developed brisk epistaxis which resolved after 2 hours with pressure.   On a subsequent flight epistaxis recurred, lasting ~4 hours and patient was brought to hospital where packing was placed 1/23.  He was given Augmentin and as per wife has been compliant.  During hospital stay patient was given valium for anxiety and PRN medications for BP control.  As per wife has becoming more lethargic with low grade fevers during flights back to [facility].  No bleeding since packs were placed.    On 1/27, pt went to OR with ENT and underwent endoscopic nasal cauterization and removal of nasal packing. Pt was started on broad spectrum antibiotics- vanco and zosyn.   ID Consulted and discharge on____________    On 1/31, pt went to cath lab and underwent craniofacial angiogram for embolization of AVM, secondary to AV shunting of R distal facial artery. Pt antibiotics changed to vanco and unasyn.     On 2/1 OR cultures from 1/27 came back as Klebsiella. ID was consulted-    Pt medically optimized for discharge. Pt stable for discharge home today.

## 2018-02-01 NOTE — CONSULT NOTE ADULT - ASSESSMENT
73yo male with recurrent epistaxis h/o AV shunting. Likely infected nasal packing  -plan for Cerebral angiogram with possible embolization on Monday if patient's infection is under control.  -D/W 
72M PMHx of recurrent epistaxis, admitted for sepsis 2/2 infected nasal packing. Nasal packing cultures growing strep, klebsiella, and E coli. Will await speciation and sensitivities of the streptococcus prior to de-escalating antibiotics as there are some resistant forms of strep here at [place].   Recommend:  Continue vancomycin and Unasyn  Await speciation and sensitivities of strep

## 2018-02-01 NOTE — DISCHARGE NOTE ADULT - PATIENT PORTAL LINK FT
“You can access the [employer] Patient Portal, offered by [facility], by registering with the following website: [email or web address]”

## 2018-02-01 NOTE — DISCHARGE NOTE ADULT - MEDICATION SUMMARY - MEDICATIONS TO TAKE
I will START or STAY ON the medications listed below when I get home from the hospital:    acetaminophen 325 mg oral tablet  -- 2 tab(s) by mouth every 6 hours, As needed, For Temp greater than 38 C (100.4 F)  -- Indication: For pain    Cozaar 50 mg oral tablet  -- 1 tab(s) by mouth once a day  -- Indication: For blood pressure    LORazepam 0.5 mg oral tablet  -- 1 tab(s) by mouth every 6 hours, As needed, Anxiety  -- Indication: For anxiety    simvastatin 20 mg oral tablet  -- 1 tab(s) by mouth once a day (at bedtime)  -- Indication: For cholesterol    Toprol-XL 25 mg oral tablet, extended release  -- 1 tab(s) by mouth once a day  -- Indication: For blood pressure    Colace 50 mg oral capsule  -- 1 cap(s) by mouth 2 times a day   -- Medication should be taken with plenty of water.    -- Indication: For stool softner    senna oral tablet  -- 2 tab(s) by mouth once a day (at bedtime)  -- Indication: For fiber pill    amoxicillin-clavulanate 875 mg-125 mg oral tablet  -- 1 tab(s) by mouth every 12 hours  -- Indication: For antibx

## 2018-02-01 NOTE — DISCHARGE NOTE ADULT - CARE PLAN
Principal Discharge DX:	Epistaxis  Goal:	maintain activities of daily living  Assessment and plan of treatment:	Return to ER or call your doctor if notice any bleeding from nose, fever > 101F, chills or n/v  Continue taking antibiotics   Call Dr. Jorgensen (Neuroendovascular) to make a follow up appointment   Call Dr. Louis (Otolaryngology) to make a follow up appoinemtn Principal Discharge DX:	Epistaxis  Goal:	maintain activities of daily living  Assessment and plan of treatment:	Return to ER or call your doctor if notice any bleeding from nose, fever > 101F, chills or n/v  Continue taking antibiotics   Call Dr. Jorgensen (Neuroendovascular) to make a follow up appointment   Call Dr. Louis (Otolaryngology) to make a follow up appointment Principal Discharge DX:	Epistaxis  Goal:	maintain activities of daily living  Assessment and plan of treatment:	Return to ER or call your doctor if notice any bleeding from nose, fever > 101F, chills or n/v  Continue taking antibiotics   Call Dr. Jorgensen (Neuroendovascular) to make a follow up appointment   Call Dr. Louis (Otolaryngology) to make a follow up appointment  Augmentin for total of 10 days Principal Discharge DX:	Epistaxis  Goal:	maintain activities of daily living  Assessment and plan of treatment:	Return to ER or call your doctor if notice any bleeding from nose, fever > 101F, chills or n/v  Continue taking antibiotics   Call Dr. Jorgensen (Neuroendovascular) to make a follow up appointment   Call Dr. Louis (Otolaryngology) to make a follow up appointment  Augmentin for total of 10 days  DF/U 3pm 2/9/2018 ENT

## 2018-02-01 NOTE — PROGRESS NOTE ADULT - SUBJECTIVE AND OBJECTIVE BOX
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  [place] limited access to specialty care.  Patient was seen at multiple hospitals and was packed Tuesday 1/23 with good hemostasis.  Since that time patient has become increasingly lethargic and intermittently febrile.  Brought to the OR on 1/27 for packing removal.  Large volume of foul smelling packing removed from bilateral nasal cavities and nasopharynx, purulence noted throughout and draining from middle meatus b/l.  Nasal mucosa was macerated throughout but no clear source of bleeding identified.  Patient was extubated in OR but remains lethargic.  Admitted to SICU for close neuromonitoring and monitoring for bleeding.    1/28: Mental status improved. A&Ox3. no longer agitated. No bleeding from nose since OR. Spiked a fever to 102 degrees at 5am in the morning s/p pan culture. Was tachycardic and hypertensive over night which resolved in the morning. Currently on Vanc/zosyn.   1/29: Clinically stable. no bleeding noted. pending cx. AF since 1500.   1/30: Clinically stable. Seen on SDU. no bleeding noted. Left hearing diminished  1/31: HECTOR overnight. using nasal spray. NPO from mn. medical clearance much appreciated. For IR angio/embo with neurosurg today.   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 1000 milliLiter(s) IV Continuous <Continuous>  sodium chloride 0.9%. 1000 milliLiter(s) IV Continuous <Continuous>    Pain medications/Neuro:  acetaminophen   Tablet 650 milliGRAM(s) Oral every 6 hours PRN  ondansetron Injectable 4 milliGRAM(s) IV Push every 6 hours PRN    Endocrine Medications:   dextrose 50% Injectable 12.5 Gram(s) IV Push once  dextrose 50% Injectable 25 Gram(s) IV Push once  dextrose 50% Injectable 25 Gram(s) IV Push once  dextrose Gel 1 Dose(s) Oral once PRN  glucagon  Injectable 1 milliGRAM(s) IntraMuscular once PRN  insulin lispro (HumaLOG) corrective regimen sliding scale   SubCutaneous Before meals and at bedtime  simvastatin 20 milliGRAM(s) Oral at bedtime    All other standing medications:   losartan 50 milliGRAM(s) Oral daily  metoprolol succinate ER 50 milliGRAM(s) Oral daily  sodium chloride 0.65% Nasal 3 Spray(s) Both Nostrils three times a day    All other PRN medications:  polyethylene glycol 3350 17 Gram(s) Oral daily PRN      Vital Signs Last 24 Hrs  T(C): 36.7 (01 Feb 2018 05:00), Max: 37.2 (31 Jan 2018 09:00)  T(F): 98.1 (01 Feb 2018 05:00), Max: 98.9 (31 Jan 2018 09:00)  HR: 125 (31 Jan 2018 21:00) (72 - 125)  BP: 117/66 (31 Jan 2018 21:00) (117/66 - 151/77)  BP(mean): 83 (31 Jan 2018 21:00) (83 - 108)  RR: 16 (31 Jan 2018 21:00) (16 - 18)  SpO2: 96% (31 Jan 2018 21:00) (95% - 96%)      01-31 @ 07:01  -  02-01 @ 07:00  --------------------------------------------------------  IN:    IV PiggyBack: 100 mL    sodium chloride 0.9%.: 170 mL  Total IN: 270 mL    OUT:    Voided: 825 mL  Total OUT: 825 mL    Total NET: -555 mL            PHYSICAL EXAM:  Gen: A&Ox3  Resp: non-labored breathing on oxygen rebreather mask, O2 sat stable in 90s  Nose: maceration of nasal sill, no active bleeding  OC/OP: no active bleeding, no visible clots, uniformly dry and irritated oral mucosa  extremities p      LABS:  CBC-                        8.4    9.3   )-----------( 375      ( 01 Feb 2018 06:07 )             25.1     02-01    137  |  101  |  16  ----------------------------<  125<H>  4.2   |  24  |  0.97    Ca    8.6      01 Feb 2018 06:07  Phos  3.0     02-01  Mg     2.2     02-01    RECENT CULTURES:  01-29 .Other L nasal cavity XXXX XXXX   Testing in progress    01-28 .Surgical Swab L nasal packing Klebsiella pneumoniae   Test cannot be performed on this type of specimen.   Klebsiella pneumoniae Accompanied by normal respiratory wojciech including  Growth in fluid media only Streptococcus species  Growth in fluid media only Coag Negative Staphylococcus  Growth in fluid media only Bacillus species (two types)    01-28 .Surgical Swab L nasal cavity Klebsiella pneumoniae   Moderate Gram Negative Rods  Few Gram positive cocci in pairs  Few White blood cells   Numerous Klebsiella pneumoniae Accompanied by normal respiratory wojciech  including  Numerous Coag Negative Staphylococcus  Numerous Streptococcus species  Moderate Yeast    01-28 .Blood Blood-Venous XXXX XXXX   No growth at 4 days.    01-27 .Urine Clean Catch (Midstream) XXXX XXXX   No growth        72M w/ hx of recurrent epistaxis 2/2 to nasal AVM packed earlier this week for hemostasis and c/b fever and lethargy likely due to SIRs response from infected packing and resulting sinusitis s/p craniofacial angiogram with endovascular embolization of distal facial artery AVM  on 1/30   - continue broad spectrum antibiotics w/ vanc and unasyn  - if patient rebleeds call ENT for emergent packing placement and emergent IR embolization, Neuro IR team aware  - epistaxis precautions: nasal saline sprays q4hs, bactroban to b/l nares qhs, avoid nasal manipulation, no nose blowing, open mouth sneezes, if supplemental oxygen required administer via face tent or face mask w/ humidified air  - neuro checks q2 hours  - Call ENT with questions or concerns  - Discussed with attending    Plan:  - Neuro checks Q2 hours  - continue abx as per ENT  - pain control with Tylenol  - IVF  - bowel regimen: miralax   - HOB at 30 degrees  - groin site checks  - SBP goal

## 2018-02-01 NOTE — DISCHARGE NOTE ADULT - CARE PROVIDERS DIRECT ADDRESSES
,[email or web address]

## 2018-02-01 NOTE — CONSULT NOTE ADULT - SUBJECTIVE AND OBJECTIVE BOX
INFECTIOUS DISEASE SERVICE INITIAL CONSULTATION NOTE    HPI:  HPI: 72M with hx of recurrent epistaxis 2/2 arterio-venous shunting requiring angio embolization x2, most recently R SPA angioembolization at [facility] 3 months ago.  Since that time patient's epistaxis have been well controlled without further episodes of bleeding.  [place] epistaxis which resolved after 2 hours with pressure.   On a subsequent flight epistaxis recurred, lasting ~4 hours and patient was brought to hospital where packing was placed 1/23.  He was given Augmentin and as per wife has been compliant.  During hospital stay patient was given valium for anxiety and PRN medications for BP control.  As per wife has becoming more lethargic with low grade fevers during flights back to [facility].  No bleeding since packs were placed. (27 Jan 2018 16:32)      PAST MEDICAL & SURGICAL HISTORY:  Epistaxis, recurrent  Hyperlipidemia, unspecified hyperlipidemia type  Hypertension, unspecified type      REVIEW OF SYSTEMS:  Constitutional: no weakness, no fevers, no chills  Dermatologic: no rash  Respiratory: no SOB, no cough  Cardiovascular: no chest pain, no palpitations  Gastrointestinal: no nausea, no vomiting, no diarrhea  Genitourinary: no dysuria, no urinary frequency, no hematuria, no urinary retention  Musculoskeletal:	no weakness, no joint swelling/pain  Neurological: no focal weakness or numbness  Endocrine: no polyuria, no polydipsia    ACTIVE ANTIMICROBIAL/ANTIBIOTIC MEDICATIONS:  ampicillin/sulbactam  IVPB 3 Gram(s) IV Intermittent every 6 hours  vancomycin  IVPB 1250 milliGRAM(s) IV Intermittent every 12 hours      OTHER MEDICATIONS:  acetaminophen   Tablet 650 milliGRAM(s) Oral every 6 hours PRN  insulin lispro (HumaLOG) corrective regimen sliding scale   SubCutaneous Before meals and at bedtime  losartan 50 milliGRAM(s) Oral daily  metoprolol succinate ER 50 milliGRAM(s) Oral daily  ondansetron Injectable 4 milliGRAM(s) IV Push every 6 hours PRN  polyethylene glycol 3350 17 Gram(s) Oral daily PRN  simvastatin 20 milliGRAM(s) Oral at bedtime  sodium chloride 0.65% Nasal 3 Spray(s) Both Nostrils three times a day      ALLERGIES:  Allergies    Vibramycin (Hives)    Intolerances        SOCIAL HISTORY:      FAMILY HISTORY:  FAMILY HISTORY:      VITAL SIGNS:  ICU Vital Signs Last 24 Hrs  T(C): 37.5 (01 Feb 2018 14:07), Max: 37.5 (01 Feb 2018 14:07)  T(F): 99.5 (01 Feb 2018 14:07), Max: 99.5 (01 Feb 2018 14:07)  HR: 92 (01 Feb 2018 12:10) (88 - 125)  BP: 119/62 (01 Feb 2018 12:10) (117/66 - 137/78)  BP(mean): 82 (01 Feb 2018 12:10) (82 - 101)  ABP: --  ABP(mean): --  RR: 16 (01 Feb 2018 12:10) (15 - 16)  SpO2: 97% (01 Feb 2018 12:10) (95% - 99%)      PHYSICAL EXAM:  Constitutional: WDWN  Head: NC/AT  Eyes: PERRLA, EOMI; anicteric slcera  ENMT: no rhinorrhea; no sinus tenderness on palpation; no oropharyngeal lesions, erythema, or exudates	  Neck: supple; no JVD or LAD  Respiratory: CTA B/L  Cardiovascular: +S1/S2, RRR; no appreciable murmurs  Gastrointestinal: soft, NT/ND; +BSx4, no HSM  Extremities: WWP; no clubbing, cyanosis, or edema  Vascular: 2+ radial, DP/PT pulses B/L  Dermatologic: skin warm and dry; no visible rashes or lesions  Neurologic: AAOx3; no focal deficits    LABS:                        8.4    9.3   )-----------( 375      ( 01 Feb 2018 06:07 )             25.1     02-01    137  |  101  |  16  ----------------------------<  125<H>  4.2   |  24  |  0.97    Ca    8.6      01 Feb 2018 06:07  Phos  3.0     02-01  Mg     2.2     02-01            MICROBIOLOGY:    Culture - Fungal, Other (collected 01-29-18 @ 16:35)  Source: .Other R nasal packing  Preliminary Report (02-01-18 @ 14:59):    Moderate Candida albicans    Culture - Acid Fast - Other w/Smear (collected 01-29-18 @ 16:35)  Source: .Other R nasal packing    Culture - Fungal, Other (collected 01-29-18 @ 16:35)  Source: .Other L nasal packing  Preliminary Report (02-01-18 @ 15:02):    Few Candida albicans    Culture - Acid Fast - Other w/Smear (collected 01-29-18 @ 16:35)  Source: .Other L nasal packing    Culture - Fungal, Other (collected 01-29-18 @ 16:35)  Source: .Other R nasal cavity  Preliminary Report (02-01-18 @ 15:01):    Few Candida albicans    Culture - Acid Fast - Other w/Smear (collected 01-29-18 @ 16:35)  Source: .Other R nasal cavity    Culture - Fungal, Other (collected 01-29-18 @ 16:35)  Source: .Other L nasal cavity  Preliminary Report (01-30-18 @ 07:45):    Testing in progress    Culture - Acid Fast - Other w/Smear (collected 01-29-18 @ 16:35)  Source: .Other L nasal cavity    Culture - Surgical Swab (collected 01-28-18 @ 09:03)  Source: .Surgical Swab R nasal packing  Gram Stain (01-29-18 @ 08:36):    Test cannot be performed on this type of specimen.  Final Report (02-01-18 @ 14:40):    Growth in fluid media only Escherichia coli Accompanied by normal    respiratory wojciech including    Growth in fluid media only Streptococcus species Identification and    susceptibility to follow.  Organism: Escherichia coli (01-31-18 @ 12:56)  Organism: Escherichia coli (01-31-18 @ 12:56)      -  Ampicillin: R >16      -  Ampicillin/Sulbactam: S <=8/4      -  Cefazolin: S <=8      -  Ceftriaxone: S <=1      -  Ciprofloxacin: S <=1      -  Gentamicin: S <=4      -  Piperacillin/Tazobactam: S <=16      -  Tobramycin: S <=4      -  Trimethoprim/Sulfamethoxazole: S <=2/38      Method Type: ROBY    Culture - Surgical Swab (collected 01-28-18 @ 09:03)  Source: .Surgical Swab L nasal packing  Gram Stain (01-29-18 @ 08:33):    Test cannot be performed on this type of specimen.  Final Report (01-31-18 @ 12:42):    Klebsiella pneumoniae Accompanied by normal respiratory wojciech including    Growth in fluid media only Streptococcus species    Growth in fluid media only Coag Negative Staphylococcus    Growth in fluid media only Bacillus species (two types)  Organism: Klebsiella pneumoniae (01-31-18 @ 12:42)  Organism: Klebsiella pneumoniae (01-31-18 @ 12:42)      -  Ampicillin: R 16      -  Ampicillin/Sulbactam: S <=8/4      -  Cefazolin: S <=8      -  Ceftriaxone: S <=1      -  Ciprofloxacin: S <=1      -  Gentamicin: S <=4      -  Piperacillin/Tazobactam: S <=16      -  Tobramycin: S <=4      -  Trimethoprim/Sulfamethoxazole: S <=2/38      Method Type: ROBY    Culture - Surgical Swab (collected 01-28-18 @ 08:17)  Source: .Surgical Swab R nasal cavity  Gram Stain (01-29-18 @ 09:44):    Moderate Gram Negative Rods    Few Gram positive cocci in pairs    Rare White blood cells  Final Report (01-31-18 @ 14:22):    Numerous Klebsiella pneumoniae    Accompanied by normal respiratory wojciech including    Numerous Streptococcus species    Rare Coag Negative Staphylococcus    Few Yeast    Rare Prevotella species Beta lactamase positive  Organism: Klebsiella pneumoniae (01-30-18 @ 08:56)  Organism: Klebsiella pneumoniae (01-30-18 @ 08:56)      -  Ampicillin: R 16      -  Ampicillin/Sulbactam: S <=8/4      -  Cefazolin: S <=8      -  Ceftriaxone: S <=1      -  Ciprofloxacin: S <=1      -  Gentamicin: S <=4      -  Piperacillin/Tazobactam: S <=16      -  Tobramycin: S <=4      -  Trimethoprim/Sulfamethoxazole: S <=2/38      Method Type: ROBY    Culture - Surgical Swab (collected 01-28-18 @ 08:17)  Source: .Surgical Swab L nasal cavity  Gram Stain (01-29-18 @ 08:57):    Moderate Gram Negative Rods    Few Gram positive cocci in pairs    Few White blood cells  Final Report (01-31-18 @ 14:12):    Numerous Klebsiella pneumoniae Accompanied by normal respiratory wojciech    including    Numerous Coag Negative Staphylococcus    Numerous Streptococcus species    Moderate Yeast  Organism: Klebsiella pneumoniae (01-31-18 @ 14:12)  Organism: Klebsiella pneumoniae (01-31-18 @ 14:12)      -  Ampicillin: R 16      -  Ampicillin/Sulbactam: S <=8/4      -  Cefazolin: S <=8      -  Ceftriaxone: S <=1      -  Ciprofloxacin: S <=1      -  Gentamicin: S <=4      -  Piperacillin/Tazobactam: S <=16      -  Tobramycin: S <=4      -  Trimethoprim/Sulfamethoxazole: S <=2/38      Method Type: ROBY    Culture - Blood (collected 01-28-18 @ 03:09)  Source: .Blood Blood-Venous  Preliminary Report (02-01-18 @ 04:01):    No growth at 4 days.    Culture - Blood (collected 01-28-18 @ 03:09)  Source: .Blood Blood-Venous  Preliminary Report (02-01-18 @ 04:01):    No growth at 4 days.    Culture - Urine (collected 01-27-18 @ 18:15)  Source: .Urine Clean Catch (Midstream)  Final Report (01-29-18 @ 12:17):    No growth        RADIOLOGY & ADDITIONAL STUDIES: INFECTIOUS DISEASE SERVICE INITIAL CONSULTATION NOTE    HPI:  HPI: 72M with hx of recurrent epistaxis 2/2 arterio-venous shunting requiring angio embolization x2, most recently R SPA angioembolization at [facility] 3 months ago.  Since that time patient's epistaxis have been well controlled without further episodes of bleeding.  [place] epistaxis which resolved after 2 hours with pressure.   On a subsequent flight epistaxis recurred, lasting ~4 hours and patient was brought to hospital where packing was placed 1/23.  He was given Augmentin and as per wife has been compliant.  During hospital stay patient was given valium for anxiety and PRN medications for BP control.  As per wife has becoming more lethargic with low grade fevers during flights back to [facility].  No bleeding since packs were placed. (27 Jan 2018 16:32)  He arrived septic, went to [facility] OR, and had the nasal packings removed. He was started on vancomycin and Unasyn. Nasal packing culture grew E coli, klebsiella, and streptococcus. ID consulted for guidance on a PO abx regimen       PAST MEDICAL & SURGICAL HISTORY:  Epistaxis, recurrent  Hyperlipidemia, unspecified hyperlipidemia type  Hypertension, unspecified type      REVIEW OF SYSTEMS:  Constitutional: no weakness, no fevers, no chills  Dermatologic: no rash  Respiratory: no SOB, no cough  Cardiovascular: no chest pain, no palpitations  Gastrointestinal: no nausea, no vomiting, no diarrhea  Genitourinary: no dysuria, no urinary frequency, no hematuria, no urinary retention  Musculoskeletal:	no weakness, no joint swelling/pain  Neurological: no focal weakness or numbness  Endocrine: no polyuria, no polydipsia    ACTIVE ANTIMICROBIAL/ANTIBIOTIC MEDICATIONS:  ampicillin/sulbactam  IVPB 3 Gram(s) IV Intermittent every 6 hours  vancomycin  IVPB 1250 milliGRAM(s) IV Intermittent every 12 hours      OTHER MEDICATIONS:  acetaminophen   Tablet 650 milliGRAM(s) Oral every 6 hours PRN  insulin lispro (HumaLOG) corrective regimen sliding scale   SubCutaneous Before meals and at bedtime  losartan 50 milliGRAM(s) Oral daily  metoprolol succinate ER 50 milliGRAM(s) Oral daily  ondansetron Injectable 4 milliGRAM(s) IV Push every 6 hours PRN  polyethylene glycol 3350 17 Gram(s) Oral daily PRN  simvastatin 20 milliGRAM(s) Oral at bedtime  sodium chloride 0.65% Nasal 3 Spray(s) Both Nostrils three times a day      ALLERGIES:  Allergies    Vibramycin (Hives)    Intolerances        SOCIAL HISTORY:      FAMILY HISTORY:      VITAL SIGNS:  ICU Vital Signs Last 24 Hrs  T(C): 37.5 (01 Feb 2018 14:07), Max: 37.5 (01 Feb 2018 14:07)  T(F): 99.5 (01 Feb 2018 14:07), Max: 99.5 (01 Feb 2018 14:07)  HR: 92 (01 Feb 2018 12:10) (88 - 125)  BP: 119/62 (01 Feb 2018 12:10) (117/66 - 137/78)  BP(mean): 82 (01 Feb 2018 12:10) (82 - 101)  ABP: --  ABP(mean): --  RR: 16 (01 Feb 2018 12:10) (15 - 16)  SpO2: 97% (01 Feb 2018 12:10) (95% - 99%)      PHYSICAL EXAM:  Constitutional: WDWN  Head: NC/AT  Eyes: anicteric sclera  ENMT: no rhinorrhea; no sinus tenderness on palpation; no oropharyngeal lesions, erythema, or exudates	  Neck: supple; no JVD or LAD  Respiratory: CTA B/L  Cardiovascular: +S1/S2, RRR; no appreciable murmurs  Gastrointestinal: soft, NT/ND; +BSx4, no HSM  Extremities: WWP; no clubbing, cyanosis, or edema  Neurologic: no focal deficits    LABS:                        8.4    9.3   )-----------( 375      ( 01 Feb 2018 06:07 )             25.1     02-01    137  |  101  |  16  ----------------------------<  125<H>  4.2   |  24  |  0.97    Ca    8.6      01 Feb 2018 06:07  Phos  3.0     02-01  Mg     2.2     02-01            MICROBIOLOGY:    Culture - Fungal, Other (collected 01-29-18 @ 16:35)  Source: .Other R nasal packing  Preliminary Report (02-01-18 @ 14:59):    Moderate Candida albicans    Culture - Acid Fast - Other w/Smear (collected 01-29-18 @ 16:35)  Source: .Other R nasal packing    Culture - Fungal, Other (collected 01-29-18 @ 16:35)  Source: .Other L nasal packing  Preliminary Report (02-01-18 @ 15:02):    Few Candida albicans    Culture - Acid Fast - Other w/Smear (collected 01-29-18 @ 16:35)  Source: .Other L nasal packing    Culture - Fungal, Other (collected 01-29-18 @ 16:35)  Source: .Other R nasal cavity  Preliminary Report (02-01-18 @ 15:01):    Few Candida albicans    Culture - Acid Fast - Other w/Smear (collected 01-29-18 @ 16:35)  Source: .Other R nasal cavity    Culture - Fungal, Other (collected 01-29-18 @ 16:35)  Source: .Other L nasal cavity  Preliminary Report (01-30-18 @ 07:45):    Testing in progress    Culture - Acid Fast - Other w/Smear (collected 01-29-18 @ 16:35)  Source: .Other L nasal cavity    Culture - Surgical Swab (collected 01-28-18 @ 09:03)  Source: .Surgical Swab R nasal packing  Gram Stain (01-29-18 @ 08:36):    Test cannot be performed on this type of specimen.  Final Report (02-01-18 @ 14:40):    Growth in fluid media only Escherichia coli Accompanied by normal    respiratory wojciech including    Growth in fluid media only Streptococcus species Identification and    susceptibility to follow.  Organism: Escherichia coli (01-31-18 @ 12:56)  Organism: Escherichia coli (01-31-18 @ 12:56)      -  Ampicillin: R >16      -  Ampicillin/Sulbactam: S <=8/4      -  Cefazolin: S <=8      -  Ceftriaxone: S <=1      -  Ciprofloxacin: S <=1      -  Gentamicin: S <=4      -  Piperacillin/Tazobactam: S <=16      -  Tobramycin: S <=4      -  Trimethoprim/Sulfamethoxazole: S <=2/38      Method Type: ROBY    Culture - Surgical Swab (collected 01-28-18 @ 09:03)  Source: .Surgical Swab L nasal packing  Gram Stain (01-29-18 @ 08:33):    Test cannot be performed on this type of specimen.  Final Report (01-31-18 @ 12:42):    Klebsiella pneumoniae Accompanied by normal respiratory wojciech including    Growth in fluid media only Streptococcus species    Growth in fluid media only Coag Negative Staphylococcus    Growth in fluid media only Bacillus species (two types)  Organism: Klebsiella pneumoniae (01-31-18 @ 12:42)  Organism: Klebsiella pneumoniae (01-31-18 @ 12:42)      -  Ampicillin: R 16      -  Ampicillin/Sulbactam: S <=8/4      -  Cefazolin: S <=8      -  Ceftriaxone: S <=1      -  Ciprofloxacin: S <=1      -  Gentamicin: S <=4      -  Piperacillin/Tazobactam: S <=16      -  Tobramycin: S <=4      -  Trimethoprim/Sulfamethoxazole: S <=2/38      Method Type: ROBY    Culture - Surgical Swab (collected 01-28-18 @ 08:17)  Source: .Surgical Swab R nasal cavity  Gram Stain (01-29-18 @ 09:44):    Moderate Gram Negative Rods    Few Gram positive cocci in pairs    Rare White blood cells  Final Report (01-31-18 @ 14:22):    Numerous Klebsiella pneumoniae    Accompanied by normal respiratory wojciech including    Numerous Streptococcus species    Rare Coag Negative Staphylococcus    Few Yeast    Rare Prevotella species Beta lactamase positive  Organism: Klebsiella pneumoniae (01-30-18 @ 08:56)  Organism: Klebsiella pneumoniae (01-30-18 @ 08:56)      -  Ampicillin: R 16      -  Ampicillin/Sulbactam: S <=8/4      -  Cefazolin: S <=8      -  Ceftriaxone: S <=1      -  Ciprofloxacin: S <=1      -  Gentamicin: S <=4      -  Piperacillin/Tazobactam: S <=16      -  Tobramycin: S <=4      -  Trimethoprim/Sulfamethoxazole: S <=2/38      Method Type: ROBY    Culture - Surgical Swab (collected 01-28-18 @ 08:17)  Source: .Surgical Swab L nasal cavity  Gram Stain (01-29-18 @ 08:57):    Moderate Gram Negative Rods    Few Gram positive cocci in pairs    Few White blood cells  Final Report (01-31-18 @ 14:12):    Numerous Klebsiella pneumoniae Accompanied by normal respiratory wojciech    including    Numerous Coag Negative Staphylococcus    Numerous Streptococcus species    Moderate Yeast  Organism: Klebsiella pneumoniae (01-31-18 @ 14:12)  Organism: Klebsiella pneumoniae (01-31-18 @ 14:12)      -  Ampicillin: R 16      -  Ampicillin/Sulbactam: S <=8/4      -  Cefazolin: S <=8      -  Ceftriaxone: S <=1      -  Ciprofloxacin: S <=1      -  Gentamicin: S <=4      -  Piperacillin/Tazobactam: S <=16      -  Tobramycin: S <=4      -  Trimethoprim/Sulfamethoxazole: S <=2/38      Method Type: ROBY    Culture - Blood (collected 01-28-18 @ 03:09)  Source: .Blood Blood-Venous  Preliminary Report (02-01-18 @ 04:01):    No growth at 4 days.    Culture - Blood (collected 01-28-18 @ 03:09)  Source: .Blood Blood-Venous  Preliminary Report (02-01-18 @ 04:01):    No growth at 4 days.    Culture - Urine (collected 01-27-18 @ 18:15)  Source: .Urine Clean Catch (Midstream)  Final Report (01-29-18 @ 12:17):    No growth        RADIOLOGY & ADDITIONAL STUDIES:

## 2018-02-01 NOTE — CONSULT NOTE ADULT - ATTENDING COMMENTS
sp removal of infected basal papcking, sp recent epistaxis, for which he was intubated. delirious 1-2h post op in SICU. will treat with precedex if needed. sepsis, treating with broad spectrum abx vanco zosyn.  no evidence of meningitis. no evidence hypercarbia. mild lactic acidosis, will treat with IVF crystalloid.
Agree with above.  There is a strep species on the wound culture.  could be strep pneumo which can be resistant to beta lactams.  For now can continue vanco and unasyn.  We will have speciation and sensitivities on 2/2/18.  If strep is sensitive to beta-lactams, plan will be to discharge on Augmentin 875 mg PO q12 to complete a 14 day course

## 2018-02-01 NOTE — PROGRESS NOTE ADULT - SUBJECTIVE AND OBJECTIVE BOX
HPI:  HPI: 72M with hx of recurrent epistaxis 2/2 arterio-venous shunting requiring angio embolization x2, most recently R SPA angioembolization at [facility] 3 months ago.  Since that time patient's epistaxis have been well controlled without further episodes of bleeding.  [place] epistaxis which resolved after 2 hours with pressure.   On a subsequent flight epistaxis recurred, lasting ~4 hours and patient was brought to hospital where packing was placed 1/23.  He was given Augmentin and as per wife has been compliant.  During hospital stay patient was given valium for anxiety and PRN medications for BP control.  As per wife has becoming more lethargic with low grade fevers during flights back to [facility].  No bleeding since packs were placed. (27 Jan 2018 16:32)    OVERNIGHT EVENTS:  No acute events overnight. Groin dressing removed this am    Vital Signs Last 24 Hrs  T(C): 37.2 (01 Feb 2018 09:00), Max: 37.2 (01 Feb 2018 09:00)  T(F): 99 (01 Feb 2018 09:00), Max: 99 (01 Feb 2018 09:00)  HR: 94 (01 Feb 2018 08:32) (88 - 125)  BP: 126/73 (01 Feb 2018 08:32) (117/66 - 137/78)  BP(mean): 94 (01 Feb 2018 08:32) (83 - 101)  RR: 16 (01 Feb 2018 08:32) (15 - 16)  SpO2: 99% (01 Feb 2018 08:32) (95% - 99%)    I&O's Summary    31 Jan 2018 07:01  -  01 Feb 2018 07:00  --------------------------------------------------------  IN: 270 mL / OUT: 825 mL / NET: -555 mL    01 Feb 2018 07:01  -  01 Feb 2018 10:57  --------------------------------------------------------  IN: 140 mL / OUT: 0 mL / NET: 140 mL        PHYSICAL EXAM:  Neurological:  AAOx3, NAD, coherent speech, Fc  CNII-XII grossly intact, PERRL, EOMI, face symmetric (baseline decreased  hearing)  MAEx4, strength 5/5 UE and LE b/l, no drift  SILT throughout b/l  No signs of active bleeding from nares b/l    TUBES/LINES:  [] Reardon  [] Lumbar Drain  [] Wound Drains  [] Others      DIET:  [] NPO  [x] Mechanical  [] Tube feeds    LABS:                        8.4    9.3   )-----------( 375      ( 01 Feb 2018 06:07 )             25.1     02-01    137  |  101  |  16  ----------------------------<  125<H>  4.2   |  24  |  0.97    Ca    8.6      01 Feb 2018 06:07  Phos  3.0     02-01  Mg     2.2     02-01              CAPILLARY BLOOD GLUCOSE      POCT Blood Glucose.: 128 mg/dL (01 Feb 2018 08:04)      Drug Levels: [] N/A  Vancomycin Level, Trough: 17 ug/mL (01-31 @ 21:08)  Vancomycin Level, Trough: 13 ug/mL (01-29 @ 06:01)    CSF Analysis: [] N/A      Allergies    Vibramycin (Hives)    Intolerances      MEDICATIONS:  Antibiotics:  ampicillin/sulbactam  IVPB 3 Gram(s) IV Intermittent every 6 hours  vancomycin  IVPB 1250 milliGRAM(s) IV Intermittent every 12 hours    Neuro:  acetaminophen   Tablet 650 milliGRAM(s) Oral every 6 hours PRN  ondansetron Injectable 4 milliGRAM(s) IV Push every 6 hours PRN    Anticoagulation:    OTHER:  dextrose 50% Injectable 12.5 Gram(s) IV Push once  dextrose 50% Injectable 25 Gram(s) IV Push once  dextrose 50% Injectable 25 Gram(s) IV Push once  dextrose Gel 1 Dose(s) Oral once PRN  glucagon  Injectable 1 milliGRAM(s) IntraMuscular once PRN  insulin lispro (HumaLOG) corrective regimen sliding scale   SubCutaneous Before meals and at bedtime  losartan 50 milliGRAM(s) Oral daily  metoprolol succinate ER 50 milliGRAM(s) Oral daily  polyethylene glycol 3350 17 Gram(s) Oral daily PRN  simvastatin 20 milliGRAM(s) Oral at bedtime  sodium chloride 0.65% Nasal 3 Spray(s) Both Nostrils three times a day    IVF:  dextrose 5%. 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 packed earlier this week for hemostasis and c/b fever and lethargy likely due to SIRs response from infected packing and resulting sinusitis, s/p angiogram, embolization of distal facial artery for nasal AVM, POD#1    EPISTAXIS; SEPSIS  No h/o HF  Handoff  MEWS Score  Epistaxis, recurrent  Hyperlipidemia, unspecified hyperlipidemia type  Hypertension, unspecified type  AVM (arteriovenous malformation)  Nasal hemorrhage  AVM (arteriovenous malformation)  Nasal hemorrhage  Epistaxis  Cerebral angiography  Endoscopic nasal cauterization  NOSE BLEED  90+  Sepsis      PLAN:  -neuro checks  -pain control  -continue abx- consult ID regarding PO meds  -advance diet as tolerated  -bowel regimen  -dispo pplanning  -d/w Dr. Jorgensen

## 2018-02-01 NOTE — DISCHARGE NOTE ADULT - PROVIDER TOKENS
TOKEN:'[id number]:'[id number]' TOKEN:'[id number]:'[id number]:'[id number]'

## 2018-02-02 VITALS
DIASTOLIC BLOOD PRESSURE: 82 MMHG | SYSTOLIC BLOOD PRESSURE: 136 MMHG | TEMPERATURE: 98 F | OXYGEN SATURATION: 95 % | HEART RATE: 88 BPM | RESPIRATION RATE: 16 BRPM

## 2018-02-02 LAB
-  CEFTRIAXONE: SIGNIFICANT CHANGE UP
-  CLINDAMYCIN: SIGNIFICANT CHANGE UP
-  ERYTHROMYCIN: SIGNIFICANT CHANGE UP
-  PENICILLIN: SIGNIFICANT CHANGE UP
-  VANCOMYCIN: SIGNIFICANT CHANGE UP
CULTURE RESULTS: SIGNIFICANT CHANGE UP
METHOD TYPE: SIGNIFICANT CHANGE UP
METHOD TYPE: SIGNIFICANT CHANGE UP
ORGANISM # SPEC MICROSCOPIC CNT: SIGNIFICANT CHANGE UP
SPECIMEN SOURCE: SIGNIFICANT CHANGE UP
SPECIMEN SOURCE: SIGNIFICANT CHANGE UP

## 2018-02-02 PROCEDURE — 82962 GLUCOSE BLOOD TEST: CPT

## 2018-02-02 PROCEDURE — 87206 SMEAR FLUORESCENT/ACID STAI: CPT

## 2018-02-02 PROCEDURE — 87086 URINE CULTURE/COLONY COUNT: CPT

## 2018-02-02 PROCEDURE — 87075 CULTR BACTERIA EXCEPT BLOOD: CPT

## 2018-02-02 PROCEDURE — 86900 BLOOD TYPING SEROLOGIC ABO: CPT

## 2018-02-02 PROCEDURE — 70487 CT MAXILLOFACIAL W/DYE: CPT

## 2018-02-02 PROCEDURE — 83605 ASSAY OF LACTIC ACID: CPT

## 2018-02-02 PROCEDURE — 84132 ASSAY OF SERUM POTASSIUM: CPT

## 2018-02-02 PROCEDURE — 85018 HEMOGLOBIN: CPT

## 2018-02-02 PROCEDURE — 82803 BLOOD GASES ANY COMBINATION: CPT

## 2018-02-02 PROCEDURE — 83036 HEMOGLOBIN GLYCOSYLATED A1C: CPT

## 2018-02-02 PROCEDURE — 36415 COLL VENOUS BLD VENIPUNCTURE: CPT

## 2018-02-02 PROCEDURE — 93970 EXTREMITY STUDY: CPT

## 2018-02-02 PROCEDURE — 85730 THROMBOPLASTIN TIME PARTIAL: CPT

## 2018-02-02 PROCEDURE — 99285 EMERGENCY DEPT VISIT HI MDM: CPT | Mod: 25

## 2018-02-02 PROCEDURE — 83735 ASSAY OF MAGNESIUM: CPT

## 2018-02-02 PROCEDURE — C1887: CPT

## 2018-02-02 PROCEDURE — 87102 FUNGUS ISOLATION CULTURE: CPT

## 2018-02-02 PROCEDURE — C1894: CPT

## 2018-02-02 PROCEDURE — 88304 TISSUE EXAM BY PATHOLOGIST: CPT

## 2018-02-02 PROCEDURE — 86901 BLOOD TYPING SEROLOGIC RH(D): CPT

## 2018-02-02 PROCEDURE — 80202 ASSAY OF VANCOMYCIN: CPT

## 2018-02-02 PROCEDURE — 80048 BASIC METABOLIC PNL TOTAL CA: CPT

## 2018-02-02 PROCEDURE — 81003 URINALYSIS AUTO W/O SCOPE: CPT

## 2018-02-02 PROCEDURE — C1769: CPT

## 2018-02-02 PROCEDURE — 99232 SBSQ HOSP IP/OBS MODERATE 35: CPT

## 2018-02-02 PROCEDURE — 86850 RBC ANTIBODY SCREEN: CPT

## 2018-02-02 PROCEDURE — 85025 COMPLETE CBC W/AUTO DIFF WBC: CPT

## 2018-02-02 PROCEDURE — 96374 THER/PROPH/DIAG INJ IV PUSH: CPT | Mod: XU

## 2018-02-02 PROCEDURE — 84295 ASSAY OF SERUM SODIUM: CPT

## 2018-02-02 PROCEDURE — 80053 COMPREHEN METABOLIC PANEL: CPT

## 2018-02-02 PROCEDURE — 87040 BLOOD CULTURE FOR BACTERIA: CPT

## 2018-02-02 PROCEDURE — 71045 X-RAY EXAM CHEST 1 VIEW: CPT

## 2018-02-02 PROCEDURE — 85027 COMPLETE CBC AUTOMATED: CPT

## 2018-02-02 PROCEDURE — 87070 CULTURE OTHR SPECIMN AEROBIC: CPT

## 2018-02-02 PROCEDURE — 84100 ASSAY OF PHOSPHORUS: CPT

## 2018-02-02 PROCEDURE — 82330 ASSAY OF CALCIUM: CPT

## 2018-02-02 PROCEDURE — 87184 SC STD DISK METHOD PER PLATE: CPT

## 2018-02-02 PROCEDURE — 99233 SBSQ HOSP IP/OBS HIGH 50: CPT | Mod: GC

## 2018-02-02 PROCEDURE — 87186 SC STD MICRODIL/AGAR DIL: CPT

## 2018-02-02 PROCEDURE — 87116 MYCOBACTERIA CULTURE: CPT

## 2018-02-02 PROCEDURE — C1889: CPT

## 2018-02-02 PROCEDURE — C1760: CPT

## 2018-02-02 PROCEDURE — 85610 PROTHROMBIN TIME: CPT

## 2018-02-02 RX ORDER — ACETAMINOPHEN 500 MG
2 TABLET ORAL
Qty: 0 | Refills: 0 | DISCHARGE
Start: 2018-02-02

## 2018-02-02 RX ORDER — SIMVASTATIN 20 MG/1
1 TABLET, FILM COATED ORAL
Qty: 30 | Refills: 0
Start: 2018-02-02 | End: 2018-03-03

## 2018-02-02 RX ORDER — SENNA PLUS 8.6 MG/1
2 TABLET ORAL AT BEDTIME
Qty: 0 | Refills: 0 | Status: DISCONTINUED | OUTPATIENT
Start: 2018-02-02 | End: 2018-02-02

## 2018-02-02 RX ORDER — DOCUSATE SODIUM 100 MG
100 CAPSULE ORAL THREE TIMES A DAY
Qty: 0 | Refills: 0 | Status: DISCONTINUED | OUTPATIENT
Start: 2018-02-02 | End: 2018-02-02

## 2018-02-02 RX ORDER — SENNA PLUS 8.6 MG/1
2 TABLET ORAL
Qty: 0 | Refills: 0 | DISCHARGE
Start: 2018-02-02

## 2018-02-02 RX ADMIN — Medication 1 APPLICATION(S): at 06:32

## 2018-02-02 RX ADMIN — Medication 1 APPLICATION(S): at 01:05

## 2018-02-02 RX ADMIN — LOSARTAN POTASSIUM 50 MILLIGRAM(S): 100 TABLET, FILM COATED ORAL at 06:30

## 2018-02-02 RX ADMIN — AMPICILLIN SODIUM AND SULBACTAM SODIUM 200 GRAM(S): 250; 125 INJECTION, POWDER, FOR SUSPENSION INTRAMUSCULAR; INTRAVENOUS at 15:02

## 2018-02-02 RX ADMIN — AMPICILLIN SODIUM AND SULBACTAM SODIUM 200 GRAM(S): 250; 125 INJECTION, POWDER, FOR SUSPENSION INTRAMUSCULAR; INTRAVENOUS at 07:30

## 2018-02-02 RX ADMIN — Medication 166.67 MILLIGRAM(S): at 15:03

## 2018-02-02 RX ADMIN — Medication 166.67 MILLIGRAM(S): at 01:00

## 2018-02-02 RX ADMIN — AMPICILLIN SODIUM AND SULBACTAM SODIUM 200 GRAM(S): 250; 125 INJECTION, POWDER, FOR SUSPENSION INTRAMUSCULAR; INTRAVENOUS at 01:00

## 2018-02-02 RX ADMIN — Medication 3 SPRAY(S): at 15:02

## 2018-02-02 RX ADMIN — Medication 50 MILLIGRAM(S): at 06:30

## 2018-02-02 RX ADMIN — Medication 1 APPLICATION(S): at 12:00

## 2018-02-02 RX ADMIN — Medication 3 SPRAY(S): at 06:32

## 2018-02-02 RX ADMIN — Medication 100 MILLIGRAM(S): at 15:04

## 2018-02-02 NOTE — PROGRESS NOTE ADULT - ATTENDING COMMENTS
Patient seen and examined on rounds. Agree with plans as outlined;  Antibiotics noted and will follow cultures
Seen and examined by me this morning. Wife at bedside. Doing well. C/w HTN meds. Hold ASA for now and upon discharge. Pt to discuss the need to take ASA with his cardiologist, but for now risks outweighs benefits. Abx per ID for +Culture from nasal packing. Stable anemia. Rest as above. D/w primary team.
Seen and examined by me this morning. Doing well and looking forward for discharge today. BP under control, c/w current meds. No ASA upon discharge. Awaiting ID recs prior to discharge for positive nasal packing culture. Rest as above. D/w pt and wife at bedside.
REJI Greenberg has acted as my scribe. No bleeding overnight and BP meds to be continued IV while NPO. Continue abx and f/u cultures.

## 2018-02-02 NOTE — PROGRESS NOTE ADULT - SUBJECTIVE AND OBJECTIVE BOX
OVERNIGHT EVENTS: No acute events    SUBJECTIVE / INTERVAL HPI: Patient seen and examined at bedside.     VITAL SIGNS:  Vital Signs Last 24 Hrs  T(C): 36.7 (02 Feb 2018 05:00), Max: 37.5 (01 Feb 2018 14:07)  T(F): 98.1 (02 Feb 2018 05:00), Max: 99.5 (01 Feb 2018 14:07)  HR: 77 (02 Feb 2018 05:00) (77 - 92)  BP: 148/79 (02 Feb 2018 05:00) (119/62 - 148/79)  BP(mean): 95 (01 Feb 2018 17:46) (82 - 95)  RR: 17 (02 Feb 2018 05:00) (16 - 17)  SpO2: 97% (02 Feb 2018 05:00) (97% - 98%)    PHYSICAL EXAM:      MEDICATIONS:  MEDICATIONS  (STANDING):  ampicillin/sulbactam  IVPB 3 Gram(s) IV Intermittent every 6 hours  bisacodyl 5 milliGRAM(s) Oral at bedtime  bisacodyl 5 milliGRAM(s) Oral once  bisacodyl Suppository 10 milliGRAM(s) Rectal once  bisacodyl Suppository 10 milliGRAM(s) Rectal at bedtime  docusate sodium 100 milliGRAM(s) Oral three times a day  insulin lispro (HumaLOG) corrective regimen sliding scale   SubCutaneous Before meals and at bedtime  losartan 50 milliGRAM(s) Oral daily  metoprolol succinate ER 50 milliGRAM(s) Oral daily  senna 2 Tablet(s) Oral at bedtime  simvastatin 20 milliGRAM(s) Oral at bedtime  sodium chloride 0.65% Nasal 3 Spray(s) Both Nostrils three times a day  sodium chloride/aloe vera Nasal Gel 1 Application(s) Both Nostrils four times a day  vancomycin  IVPB 1250 milliGRAM(s) IV Intermittent every 12 hours    MEDICATIONS  (PRN):  acetaminophen   Tablet 650 milliGRAM(s) Oral every 6 hours PRN For Temp greater than 38 C (100.4 F)  ondansetron Injectable 4 milliGRAM(s) IV Push every 6 hours PRN Nausea  polyethylene glycol 3350 17 Gram(s) Oral daily PRN Constipation      ALLERGIES:  Vibramycin (Hives)      LABS:                        8.4    9.3   )-----------( 375      ( 01 Feb 2018 06:07 )             25.1     02-01    137  |  101  |  16  ----------------------------<  125<H>  4.2   |  24  |  0.97    Ca    8.6      01 Feb 2018 06:07  Phos  3.0     02-01  Mg     2.2     02-01      CAPILLARY BLOOD GLUCOSE  POCT Blood Glucose.: 128 mg/dL (01 Feb 2018 08:04)    RADIOLOGY & ADDITIONAL TESTS: Reviewed.    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  1. 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 On Vancomycin and Unasyn pos-operatively  -Discharge planning per NSG/ENT, for continued abx treatment, ID consulted for further recs, cultures growing pan-sensitive Klebsiella  -Pain is well controlled at this time  -No ppx given recent bleeding  -Miralax for constipation    2. HTN - blood pressure currently well controlled, continue Losartan 50mg daily and Toprol XL 50mg daily    3. HLD - continue home dose of Simvastatin 20mg daily     4. Anemia - Hb around 7-9, currently at baseline  -Maintain active type and screen    Discussed with Dr Gates OVERNIGHT EVENTS: No acute events    SUBJECTIVE / INTERVAL HPI: Patient seen and examined at bedside. Reports feeling well, sense of smell now returning to patient, eager for discharge. Denies epistaxis lightheadedness, dizziness SOB, CP, abdominal symptoms.     VITAL SIGNS:  Vital Signs Last 24 Hrs  T(C): 36.7 (02 Feb 2018 05:00), Max: 37.5 (01 Feb 2018 14:07)  T(F): 98.1 (02 Feb 2018 05:00), Max: 99.5 (01 Feb 2018 14:07)  HR: 77 (02 Feb 2018 05:00) (77 - 92)  BP: 148/79 (02 Feb 2018 05:00) (119/62 - 148/79)  BP(mean): 95 (01 Feb 2018 17:46) (82 - 95)  RR: 17 (02 Feb 2018 05:00) (16 - 17)  SpO2: 97% (02 Feb 2018 05:00) (97% - 98%)    PHYSICAL EXAM:  Gen: well developed, well nourished, resting comfortably in bed  HEENT: moist mucus membranes, EOMI, PERRLA, sclera nonicteric. No nasal bleeding seen  CV: regular rate and rhythm, +S1/S2, no murmurs  Resp: clear to auscultation bilaterally, no rales/rhonchi/wheezing  Abd: soft, nontender to palpation, +BS  Extr: warm and well perfused, non-edematous, distal pulses palpable. Groin without hematoma, dressing now removed  Neuro: alert and oriented to person, place and time; good fund of knowledge with appropriate affect; cranial nerves - EOMI, PERRLA, face symmetric, tongue midline; motor with good tone and bulk, sensory intact throughout    MEDICATIONS:  MEDICATIONS  (STANDING):  ampicillin/sulbactam  IVPB 3 Gram(s) IV Intermittent every 6 hours  bisacodyl 5 milliGRAM(s) Oral at bedtime  bisacodyl 5 milliGRAM(s) Oral once  bisacodyl Suppository 10 milliGRAM(s) Rectal once  bisacodyl Suppository 10 milliGRAM(s) Rectal at bedtime  docusate sodium 100 milliGRAM(s) Oral three times a day  insulin lispro (HumaLOG) corrective regimen sliding scale   SubCutaneous Before meals and at bedtime  losartan 50 milliGRAM(s) Oral daily  metoprolol succinate ER 50 milliGRAM(s) Oral daily  senna 2 Tablet(s) Oral at bedtime  simvastatin 20 milliGRAM(s) Oral at bedtime  sodium chloride 0.65% Nasal 3 Spray(s) Both Nostrils three times a day  sodium chloride/aloe vera Nasal Gel 1 Application(s) Both Nostrils four times a day  vancomycin  IVPB 1250 milliGRAM(s) IV Intermittent every 12 hours    MEDICATIONS  (PRN):  acetaminophen   Tablet 650 milliGRAM(s) Oral every 6 hours PRN For Temp greater than 38 C (100.4 F)  ondansetron Injectable 4 milliGRAM(s) IV Push every 6 hours PRN Nausea  polyethylene glycol 3350 17 Gram(s) Oral daily PRN Constipation      ALLERGIES:  Vibramycin (Hives)      LABS:                        8.4    9.3   )-----------( 375      ( 01 Feb 2018 06:07 )             25.1     02-01    137  |  101  |  16  ----------------------------<  125<H>  4.2   |  24  |  0.97    Ca    8.6      01 Feb 2018 06:07  Phos  3.0     02-01  Mg     2.2     02-01      CAPILLARY BLOOD GLUCOSE  POCT Blood Glucose.: 128 mg/dL (01 Feb 2018 08:04)    RADIOLOGY & ADDITIONAL TESTS: Reviewed.    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  1. 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 On Vancomycin and Unasyn pos-operatively  -Discharge planning per NSG/ENT, for continued abx treatment, ID consulted for further recs, cultures growing Klebsiella, Strep and EColi   -Pain is well controlled at this time  -No ppx given recent bleeding  -Miralax for constipation    2. HTN - blood pressure currently well controlled, continue Losartan 50mg daily and Toprol XL 50mg daily    3. HLD - continue home dose of Simvastatin 20mg daily     4. Anemia - Hb around 7-9, currently at baseline  -Maintain active type and screen    Discussed with Dr Gates OVERNIGHT EVENTS: No acute events    SUBJECTIVE / INTERVAL HPI: Patient seen and examined at bedside. Reports feeling well, sense of smell now returning to patient, eager for discharge. Denies epistaxis lightheadedness, dizziness SOB, CP, abdominal symptoms.     VITAL SIGNS:  Vital Signs Last 24 Hrs  T(C): 36.7 (02 Feb 2018 05:00), Max: 37.5 (01 Feb 2018 14:07)  T(F): 98.1 (02 Feb 2018 05:00), Max: 99.5 (01 Feb 2018 14:07)  HR: 77 (02 Feb 2018 05:00) (77 - 92)  BP: 148/79 (02 Feb 2018 05:00) (119/62 - 148/79)  BP(mean): 95 (01 Feb 2018 17:46) (82 - 95)  RR: 17 (02 Feb 2018 05:00) (16 - 17)  SpO2: 97% (02 Feb 2018 05:00) (97% - 98%)    PHYSICAL EXAM:  Gen: well developed, well nourished, resting comfortably in bed  HEENT: moist mucus membranes, EOMI, PERRLA, sclera nonicteric. No nasal bleeding seen  CV: regular rate and rhythm, +S1/S2, no murmurs  Resp: clear to auscultation bilaterally, no rales/rhonchi/wheezing  Abd: soft, nontender to palpation, +BS  Extr: warm and well perfused, non-edematous, distal pulses palpable. Groin without hematoma, dressing now removed  Neuro: alert and oriented to person, place and time; good fund of knowledge with appropriate affect; cranial nerves - EOMI, PERRLA, face symmetric, tongue midline; motor with good tone and bulk, sensory intact throughout    MEDICATIONS:  MEDICATIONS  (STANDING):  ampicillin/sulbactam  IVPB 3 Gram(s) IV Intermittent every 6 hours  bisacodyl 5 milliGRAM(s) Oral at bedtime  bisacodyl 5 milliGRAM(s) Oral once  bisacodyl Suppository 10 milliGRAM(s) Rectal once  bisacodyl Suppository 10 milliGRAM(s) Rectal at bedtime  docusate sodium 100 milliGRAM(s) Oral three times a day  insulin lispro (HumaLOG) corrective regimen sliding scale   SubCutaneous Before meals and at bedtime  losartan 50 milliGRAM(s) Oral daily  metoprolol succinate ER 50 milliGRAM(s) Oral daily  senna 2 Tablet(s) Oral at bedtime  simvastatin 20 milliGRAM(s) Oral at bedtime  sodium chloride 0.65% Nasal 3 Spray(s) Both Nostrils three times a day  sodium chloride/aloe vera Nasal Gel 1 Application(s) Both Nostrils four times a day  vancomycin  IVPB 1250 milliGRAM(s) IV Intermittent every 12 hours    MEDICATIONS  (PRN):  acetaminophen   Tablet 650 milliGRAM(s) Oral every 6 hours PRN For Temp greater than 38 C (100.4 F)  ondansetron Injectable 4 milliGRAM(s) IV Push every 6 hours PRN Nausea  polyethylene glycol 3350 17 Gram(s) Oral daily PRN Constipation      ALLERGIES:  Vibramycin (Hives)      LABS:                        8.4    9.3   )-----------( 375      ( 01 Feb 2018 06:07 )             25.1     02-01    137  |  101  |  16  ----------------------------<  125<H>  4.2   |  24  |  0.97    Ca    8.6      01 Feb 2018 06:07  Phos  3.0     02-01  Mg     2.2     02-01      CAPILLARY BLOOD GLUCOSE  POCT Blood Glucose.: 128 mg/dL (01 Feb 2018 08:04)    RADIOLOGY & ADDITIONAL TESTS: Reviewed.    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  1. 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 On Vancomycin and Unasyn pos-operatively  -Discharge planning per NSG/ENT, for continued abx treatment, ID consulted for further recs, cultures growing Klebsiella, Strep and EColi   -Pain is well controlled at this time  -No ppx given recent bleeding  -Miralax for constipation  -Patient instructed DO NOT RESTART ASPIRIN AFTER DISCHARGE     2. HTN - blood pressure currently well controlled, continue Losartan 50mg daily and Toprol XL 50mg daily    3. HLD - continue home dose of Simvastatin 20mg daily     4. Anemia - Hb around 7-9, currently at baseline  -Maintain active type and screen    Discussed with Dr Gates, we will sign off. Please reconsult with further questions

## 2018-02-02 NOTE — PROGRESS NOTE ADULT - PROVIDER SPECIALTY LIST ADULT
ENT
Infectious Disease
Internal Medicine
Neurosurgery
SICU
SICU
ENT
Internal Medicine
Neurosurgery

## 2018-02-02 NOTE — PROGRESS NOTE ADULT - ASSESSMENT
IMPRESSION:  Nasal cavity infection.  Strep species has been identified as strep viridans which is sensitive to the penicillins    Recommend:  1.  Can stop IV antibiotics and discharge with Augmentin 875 mg PO q12 x 10 days    Will sign off.  Reconsult as needed

## 2018-02-02 NOTE — PROGRESS NOTE ADULT - SUBJECTIVE AND OBJECTIVE BOX
INTERVAL HPI/OVERNIGHT EVENTS:    Patient seen and examined this evening.   Feels well    CONSTITUTIONAL:  Negative fever or chills, feels well, good appetite  EYES:  Negative  blurry vision or double vision  CARDIOVASCULAR:  Negative for chest pain or palpitations  RESPIRATORY:  Negative for cough, wheezing, or SOB   GASTROINTESTINAL:  Negative for nausea, vomiting, diarrhea, constipation, or abdominal pain  GENITOURINARY:  Negative frequency, urgency or dysuria  NEUROLOGIC:  No headache, confusion, dizziness, lightheadedness      ANTIBIOTICS/RELEVANT:    MEDICATIONS  (STANDING):  amoxicillin  875 milliGRAM(s)/clavulanate 1 Tablet(s) Oral every 12 hours  bisacodyl 5 milliGRAM(s) Oral at bedtime  bisacodyl Suppository 10 milliGRAM(s) Rectal at bedtime  docusate sodium 100 milliGRAM(s) Oral three times a day  insulin lispro (HumaLOG) corrective regimen sliding scale   SubCutaneous Before meals and at bedtime  losartan 50 milliGRAM(s) Oral daily  metoprolol succinate ER 50 milliGRAM(s) Oral daily  senna 2 Tablet(s) Oral at bedtime  simvastatin 20 milliGRAM(s) Oral at bedtime  sodium chloride 0.65% Nasal 3 Spray(s) Both Nostrils three times a day  sodium chloride/aloe vera Nasal Gel 1 Application(s) Both Nostrils four times a day    MEDICATIONS  (PRN):  acetaminophen   Tablet 650 milliGRAM(s) Oral every 6 hours PRN For Temp greater than 38 C (100.4 F)  ondansetron Injectable 4 milliGRAM(s) IV Push every 6 hours PRN Nausea  polyethylene glycol 3350 17 Gram(s) Oral daily PRN Constipation        Vital Signs Last 24 Hrs  T(C): 36.6 (02 Feb 2018 15:10), Max: 36.8 (02 Feb 2018 09:00)  T(F): 97.9 (02 Feb 2018 15:10), Max: 98.2 (02 Feb 2018 09:00)  HR: 88 (02 Feb 2018 15:10) (77 - 88)  BP: 136/82 (02 Feb 2018 15:10) (130/71 - 151/71)  BP(mean): 95 (01 Feb 2018 17:46) (95 - 95)  RR: 16 (02 Feb 2018 15:10) (16 - 17)  SpO2: 95% (02 Feb 2018 15:10) (95% - 99%)    PHYSICAL EXAM:  Constitutional:Well-developed, well nourished  Eyes:AUGUSTUS, EOMI  Ear/Nose/Throat: no oral lesion, no sinus tenderness on percussion	  Neck:  supple  Respiratory: clear to auscultation   Cardiovascular: S1S2 RRR, no murmurs  Gastrointestinal:soft, (+) BS, no HSM  Extremities:no edema   Vascular: DP Pulse:	right normal; left normal      LABS:                        8.4    9.3   )-----------( 375      ( 01 Feb 2018 06:07 )             25.1     02-01    137  |  101  |  16  ----------------------------<  125<H>  4.2   |  24  |  0.97    Ca    8.6      01 Feb 2018 06:07  Phos  3.0     02-01  Mg     2.2     02-01            MICROBIOLOGY:  Culture - Surgical Swab (01.28.18 @ 09:03)    Gram Stain:   Test cannot be performed on this type of specimen.    -  Ampicillin: R >16    -  Ampicillin/Sulbactam: S <=8/4    -  Cefazolin: S <=8    -  Ceftriaxone: S <=1    -  Ceftriaxone: S 0.064    -  Ciprofloxacin: S <=1    -  Clindamycin: R    -  Erythromycin: R    -  Gentamicin: S <=4    -  Penicillin: S 0.094    -  Piperacillin/Tazobactam: S <=16    -  Tobramycin: S <=4    -  Trimethoprim/Sulfamethoxazole: S <=2/38    -  Vancomycin: S    Specimen Source: .Surgical Swab R nasal packing    Culture Results:   Growth in fluid media only Escherichia coli Accompanied by normal  respiratory wojciech including  Growth in fluid media only Streptococcus viridans group    Organism Identification: Escherichia coli  Streptococcus viridans group  Streptococcus viridans group    Organism: Escherichia coli    Organism: Streptococcus viridans group    Organism: Streptococcus viridans group    Method Type: ROBY    Method Type: KB    Method Type: ETEST        RADIOLOGY & ADDITIONAL STUDIES:

## 2018-02-02 NOTE — PROGRESS NOTE ADULT - SUBJECTIVE AND OBJECTIVE BOX
HPI:  HPI: 72M with hx of recurrent epistaxis 2/2 arterio-venous shunting requiring angio embolization x2, most recently R SPA angioembolization at [facility] 3 months ago.  Since that time patient's epistaxis have been well controlled without further episodes of bleeding.  [place] epistaxis which resolved after 2 hours with pressure.   On a subsequent flight epistaxis recurred, lasting ~4 hours and patient was brought to hospital where packing was placed 1/23.  He was given Augmentin and as per wife has been compliant.  During hospital stay patient was given valium for anxiety and PRN medications for BP control.  As per wife has becoming more lethargic with low grade fevers during flights back to [facility].  No bleeding since packs were placed. (27 Jan 2018 16:32)    OVERNIGHT EVENTS:  Vital Signs Last 24 Hrs  T(C): 36.7 (02 Feb 2018 05:00), Max: 37.5 (01 Feb 2018 14:07)  T(F): 98.1 (02 Feb 2018 05:00), Max: 99.5 (01 Feb 2018 14:07)  HR: 77 (02 Feb 2018 05:00) (77 - 92)  BP: 148/79 (02 Feb 2018 05:00) (119/62 - 148/79)  BP(mean): 95 (01 Feb 2018 17:46) (82 - 95)  RR: 17 (02 Feb 2018 05:00) (16 - 17)  SpO2: 97% (02 Feb 2018 05:00) (97% - 98%)    I&O's Summary    01 Feb 2018 07:01  -  02 Feb 2018 07:00  --------------------------------------------------------  IN: 1370 mL / OUT: 650 mL / NET: 720 mL        PHYSICAL EXAM:  Neurological:  A&OX3 Cranial nerves intact  LIRA  Right femoral steri strips intact    Cardiovascular:RRR  Respiratory: Lungs CTAB  Gastrointestinal: +BS  Genitourinary: Voiding without difficulty  Extremities: wrm and dry      DIET: Regular    LABS:                        8.4    9.3   )-----------( 375      ( 01 Feb 2018 06:07 )             25.1     02-01    137  |  101  |  16  ----------------------------<  125<H>  4.2   |  24  |  0.97    Ca    8.6      01 Feb 2018 06:07  Phos  3.0     02-01  Mg     2.2     02-01              CAPILLARY BLOOD GLUCOSE          Drug Levels: [] N/A  Vancomycin Level, Trough: 17 ug/mL (01-31 @ 21:08)  Vancomycin Level, Trough: 13 ug/mL (01-29 @ 06:01)    CSF Analysis: [] N/A      Allergies    Vibramycin (Hives)    Intolerances      MEDICATIONS:  Antibiotics:  ampicillin/sulbactam  IVPB 3 Gram(s) IV Intermittent every 6 hours  vancomycin  IVPB 1250 milliGRAM(s) IV Intermittent every 12 hours    Neuro:  acetaminophen   Tablet 650 milliGRAM(s) Oral every 6 hours PRN  ondansetron Injectable 4 milliGRAM(s) IV Push every 6 hours PRN    Anticoagulation:    OTHER:  bisacodyl 5 milliGRAM(s) Oral at bedtime  bisacodyl 5 milliGRAM(s) Oral once  bisacodyl Suppository 10 milliGRAM(s) Rectal once  bisacodyl Suppository 10 milliGRAM(s) Rectal at bedtime  docusate sodium 100 milliGRAM(s) Oral three times a day  insulin lispro (HumaLOG) corrective regimen sliding scale   SubCutaneous Before meals and at bedtime  losartan 50 milliGRAM(s) Oral daily  metoprolol succinate ER 50 milliGRAM(s) Oral daily  polyethylene glycol 3350 17 Gram(s) Oral daily PRN  senna 2 Tablet(s) Oral at bedtime  simvastatin 20 milliGRAM(s) Oral at bedtime  sodium chloride 0.65% Nasal 3 Spray(s) Both Nostrils three times a day  sodium chloride/aloe vera Nasal Gel 1 Application(s) Both Nostrils four times a day    IVF:    CULTURES:  Culture Results:   Testing in progress (01-29 @ 16:35)  Culture Results:   Few Candida albicans (01-29 @ 16:35)    RADIOLOGY & ADDITIONAL TESTS:      ASSESSMENT:     72y Male w/hx of 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

## 2018-02-02 NOTE — PROGRESS NOTE ADULT - SUBJECTIVE AND OBJECTIVE BOX
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  [place] with limited access to specialty care.  Patient was seen at multiple hospitals and was packed Tuesday 1/23 with good hemostasis.  Since that time patient has become increasingly lethargic and intermittently febrile.  Brought to the OR on 1/27 for packing removal.  Large volume of foul smelling packing removed from bilateral nasal cavities and nasopharynx, purulence noted throughout and draining from middle meatus b/l.  Nasal mucosa was macerated throughout but no clear source of bleeding identified.  Patient was extubated in OR but remains lethargic.  Admitted to SICU for close neuromonitoring and monitoring for bleeding.    1/28: Mental status improved. A&Ox3. no longer agitated. No bleeding from nose since OR. Spiked a fever to 102 degrees at 5am in the morning s/p pan culture. Was tachycardic and hypertensive over night which resolved in the morning. Currently on Vanc/zosyn.   1/29: Clinically stable. no bleeding noted. pending cx. AF since 1500.   1/30: Clinically stable. Seen on SDU. no bleeding noted. Left hearing diminished  1/31: HECTOR overnight. using nasal spray. NPO from mn. medical clearance much appreciated. For IR angio/embo with neurosurg today.   2/1: s/p endovascular embolization of distal facial artery AVM  2/2: No acute events overnight, no bleeding, pain controlled      Allergies    Vibramycin (Hives)    Intolerances        MEDICATIONS:  Antiinfectives:   ampicillin/sulbactam  IVPB 3 Gram(s) IV Intermittent every 6 hours  vancomycin  IVPB 1250 milliGRAM(s) IV Intermittent every 12 hours    IV fluids:    Hematologic/Anticoagulation:    Pain medications/Neuro:  acetaminophen   Tablet 650 milliGRAM(s) Oral every 6 hours PRN  ondansetron Injectable 4 milliGRAM(s) IV Push every 6 hours PRN    Endocrine Medications:   insulin lispro (HumaLOG) corrective regimen sliding scale   SubCutaneous Before meals and at bedtime  simvastatin 20 milliGRAM(s) Oral at bedtime    All other standing medications:   losartan 50 milliGRAM(s) Oral daily  metoprolol succinate ER 50 milliGRAM(s) Oral daily  sodium chloride 0.65% Nasal 3 Spray(s) Both Nostrils three times a day  sodium chloride/aloe vera Nasal Gel 1 Application(s) Both Nostrils four times a day    All other PRN medications:  polyethylene glycol 3350 17 Gram(s) Oral daily PRN      Vital Signs Last 24 Hrs  T(C): 36.7 (02 Feb 2018 05:00), Max: 37.5 (01 Feb 2018 14:07)  T(F): 98.1 (02 Feb 2018 05:00), Max: 99.5 (01 Feb 2018 14:07)  HR: 77 (02 Feb 2018 05:00) (77 - 94)  BP: 148/79 (02 Feb 2018 05:00) (119/62 - 148/79)  BP(mean): 95 (01 Feb 2018 17:46) (82 - 95)  RR: 17 (02 Feb 2018 05:00) (16 - 17)  SpO2: 97% (02 Feb 2018 05:00) (97% - 99%)      02-01 @ 07:01  -  02-02 @ 07:00  --------------------------------------------------------  IN:    IV PiggyBack: 850 mL    Oral Fluid: 480 mL    sodium chloride 0.9%: 40 mL  Total IN: 1370 mL    OUT:    Voided: 650 mL  Total OUT: 650 mL    Total NET: 720 mL            PHYSICAL EXAM:  Gen: alert and responsive  Resp: non-labored breathing on oxygen rebreather mask, O2 sat stable in 90s  Nose: maceration of nasal sill, no active bleeding  OC/OP: no active bleeding, no visible clots, uniformly dry and irritated oral mucosa  extremities wwp    LABS:  CBC-                        8.4    9.3   )-----------( 375      ( 01 Feb 2018 06:07 )             25.1     BMP/CMP-  01 Feb 2018 06:07    137    |  101    |  16     ----------------------------<  125    4.2     |  24     |  0.97     Ca    8.6        01 Feb 2018 06:07  Phos  3.0       01 Feb 2018 06:07  Mg     2.2       01 Feb 2018 06:07          Assessment/Plan:   72M w/ hx of recurrent epistaxis 2/2 to nasal AVM packed earlier this week for hemostasis and c/b fever and lethargy likely due to SIRs response from infected packing and resulting sinusitis s/p craniofacial angiogram with endovascular embolization of distal facial artery AVM  on 1/30   - continue broad spectrum f/u ID recommendations  - if patient rebleeds call ENT for emergent packing placement and emergent IR embolization, Neuro IR team aware  - epistaxis precautions: nasal saline sprays q4hs, bactroban to b/l nares qhs, avoid nasal manipulation, no nose blowing, open mouth sneezes, if supplemental oxygen required administer via face tent or face mask w/ humidified air  - Neuro checks Q2 hours  - pain control with Tylenol  - IVF  - bowel regimen: miralax   - HOB at 30 degrees  - groin site checks  - SBP goal   - Call ENT with questions/concerns ENT [facility] DAILY PROGRESS NOTE    Overnight events/Interval HPI:   72M with hx of 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  [place] with limited access to specialty care.  Patient was seen at multiple hospitals and was packed Tuesday 1/23 with good hemostasis.  Since that time patient has become increasingly lethargic and intermittently febrile.  Brought to the OR on 1/27 for packing removal.  Large volume of foul smelling packing removed from bilateral nasal cavities and nasopharynx, purulence noted throughout and draining from middle meatus b/l.  Nasal mucosa was macerated throughout but no clear source of bleeding identified.  Patient was extubated in OR but remains lethargic.  Admitted to SICU for close neuromonitoring and monitoring for bleeding.    1/28: Mental status improved. A&Ox3. no longer agitated. No bleeding from nose since OR. Spiked a fever to 102 degrees at 5am in the morning s/p pan culture. Was tachycardic and hypertensive over night which resolved in the morning. Currently on Vanc/zosyn.   1/29: Clinically stable. no bleeding noted. pending cx. AF since 1500.   1/30: Clinically stable. Seen on SDU. no bleeding noted. Left hearing diminished  1/31: HECTOR overnight. using nasal spray. NPO from mn. medical clearance much appreciated. For IR angio/embo with neurosurg today.   2/1: s/p endovascular embolization of distal facial artery AVM  2/2: No acute events overnight, no bleeding, pain controlled      Allergies    Vibramycin (Hives)    Intolerances        MEDICATIONS:  Antiinfectives:   ampicillin/sulbactam  IVPB 3 Gram(s) IV Intermittent every 6 hours  vancomycin  IVPB 1250 milliGRAM(s) IV Intermittent every 12 hours    IV fluids:    Hematologic/Anticoagulation:    Pain medications/Neuro:  acetaminophen   Tablet 650 milliGRAM(s) Oral every 6 hours PRN  ondansetron Injectable 4 milliGRAM(s) IV Push every 6 hours PRN    Endocrine Medications:   insulin lispro (HumaLOG) corrective regimen sliding scale   SubCutaneous Before meals and at bedtime  simvastatin 20 milliGRAM(s) Oral at bedtime    All other standing medications:   losartan 50 milliGRAM(s) Oral daily  metoprolol succinate ER 50 milliGRAM(s) Oral daily  sodium chloride 0.65% Nasal 3 Spray(s) Both Nostrils three times a day  sodium chloride/aloe vera Nasal Gel 1 Application(s) Both Nostrils four times a day    All other PRN medications:  polyethylene glycol 3350 17 Gram(s) Oral daily PRN      Vital Signs Last 24 Hrs  T(C): 36.7 (02 Feb 2018 05:00), Max: 37.5 (01 Feb 2018 14:07)  T(F): 98.1 (02 Feb 2018 05:00), Max: 99.5 (01 Feb 2018 14:07)  HR: 77 (02 Feb 2018 05:00) (77 - 94)  BP: 148/79 (02 Feb 2018 05:00) (119/62 - 148/79)  BP(mean): 95 (01 Feb 2018 17:46) (82 - 95)  RR: 17 (02 Feb 2018 05:00) (16 - 17)  SpO2: 97% (02 Feb 2018 05:00) (97% - 99%)      02-01 @ 07:01  -  02-02 @ 07:00  --------------------------------------------------------  IN:    IV PiggyBack: 850 mL    Oral Fluid: 480 mL    sodium chloride 0.9%: 40 mL  Total IN: 1370 mL    OUT:    Voided: 650 mL  Total OUT: 650 mL    Total NET: 720 mL            PHYSICAL EXAM:  Gen: alert and responsive  Resp: non-labored breathing on oxygen rebreather mask, O2 sat stable in 90s  Nose: maceration of nasal sill, no active bleeding  OC/OP: no active bleeding, no visible clots, uniformly dry and irritated oral mucosa  extremities wwp    LABS:  CBC-                        8.4    9.3   )-----------( 375      ( 01 Feb 2018 06:07 )             25.1     BMP/CMP-  01 Feb 2018 06:07    137    |  101    |  16     ----------------------------<  125    4.2     |  24     |  0.97     Ca    8.6        01 Feb 2018 06:07  Phos  3.0       01 Feb 2018 06:07  Mg     2.2       01 Feb 2018 06:07          Assessment/Plan:   72M w/ hx of recurrent epistaxis 2/2 to nasal AVM packed earlier this week for hemostasis and c/b fever and lethargy likely due to SIRs response from infected packing and resulting sinusitis s/p craniofacial angiogram with endovascular embolization of distal facial artery AVM  on 1/30   - continue broad spectrum f/u ID recommendations  - if patient rebleeds call ENT for emergent packing placement and emergent IR embolization, Neuro IR team aware  - epistaxis precautions: nasal saline sprays q4hs, bactroban to b/l nares qhs, avoid nasal manipulation, no nose blowing, open mouth sneezes, if supplemental oxygen required administer via face tent or face mask w/ humidified air  - Neuro checks Q2 hours  - pain control with Tylenol  - IVF  - bowel regimen: miralax   - HOB at 30 degrees  - groin site checks  - SBP goal     - Clear for discharge from ENT perspective. Continue with saline sprays and ayr gel regularly through the daily (at least QID). Follow up with Dr Jonny Louis on Monday 2/5 at 3pm.   - Call ENT with questions/concerns

## 2018-02-03 LAB
METHOD TYPE: SIGNIFICANT CHANGE UP
ORGANISM # SPEC MICROSCOPIC CNT: SIGNIFICANT CHANGE UP

## 2018-02-05 ENCOUNTER — APPOINTMENT (OUTPATIENT)
Dept: OTOLARYNGOLOGY | Facility: HOSPITAL | Age: 73
End: 2018-02-05
Payer: MEDICARE

## 2018-02-05 VITALS — HEIGHT: 70.5 IN | WEIGHT: 183 LBS | BODY MASS INDEX: 25.91 KG/M2

## 2018-02-05 PROCEDURE — 99213 OFFICE O/P EST LOW 20 MIN: CPT | Mod: 25

## 2018-02-05 PROCEDURE — 31237 NSL/SINS NDSC SURG BX POLYPC: CPT | Mod: 50

## 2018-02-07 DIAGNOSIS — Q28.0 ARTERIOVENOUS MALFORMATION OF PRECEREBRAL VESSELS: ICD-10-CM
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
## 2018-02-07 DIAGNOSIS — R04.0 EPISTAXIS: ICD-10-CM
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
## 2018-02-07 DIAGNOSIS — E78.5 HYPERLIPIDEMIA, UNSPECIFIED: ICD-10-CM

## 2018-02-07 DIAGNOSIS — A41.9 SEPSIS, UNSPECIFIED ORGANISM: ICD-10-CM

## 2018-02-07 DIAGNOSIS — J01.40 ACUTE PANSINUSITIS, UNSPECIFIED: ICD-10-CM

## 2018-02-07 DIAGNOSIS — T81.4XXA INFECTION FOLLOWING A PROCEDURE, INITIAL ENCOUNTER: ICD-10-CM

## 2018-02-07 DIAGNOSIS — I10 ESSENTIAL (PRIMARY) HYPERTENSION: ICD-10-CM

## 2018-02-15 ENCOUNTER — APPOINTMENT (OUTPATIENT)
Dept: NEUROSURGERY | Facility: CLINIC | Age: 73
End: 2018-02-15
Payer: MEDICARE

## 2018-02-15 VITALS
WEIGHT: 189 LBS | DIASTOLIC BLOOD PRESSURE: 74 MMHG | HEART RATE: 70 BPM | HEIGHT: 70 IN | BODY MASS INDEX: 27.06 KG/M2 | OXYGEN SATURATION: 99 % | SYSTOLIC BLOOD PRESSURE: 120 MMHG

## 2018-02-15 DIAGNOSIS — Z78.9 OTHER SPECIFIED HEALTH STATUS: ICD-10-CM

## 2018-02-15 DIAGNOSIS — I10 ESSENTIAL (PRIMARY) HYPERTENSION: ICD-10-CM

## 2018-02-15 PROCEDURE — 99215 OFFICE O/P EST HI 40 MIN: CPT

## 2018-02-15 RX ORDER — NIACIN 500 MG/1
500 TABLET, FILM COATED, EXTENDED RELEASE ORAL
Refills: 0 | Status: DISCONTINUED | COMMUNITY
End: 2018-02-15

## 2018-02-20 ENCOUNTER — APPOINTMENT (OUTPATIENT)
Dept: OTOLARYNGOLOGY | Facility: CLINIC | Age: 73
End: 2018-02-20
Payer: MEDICARE

## 2018-02-20 VITALS
WEIGHT: 189 LBS | HEART RATE: 70 BPM | HEIGHT: 70 IN | SYSTOLIC BLOOD PRESSURE: 138 MMHG | DIASTOLIC BLOOD PRESSURE: 76 MMHG | BODY MASS INDEX: 27.06 KG/M2

## 2018-02-20 PROCEDURE — 99214 OFFICE O/P EST MOD 30 MIN: CPT | Mod: 25

## 2018-02-20 PROCEDURE — 31237 NSL/SINS NDSC SURG BX POLYPC: CPT | Mod: 50

## 2018-02-28 LAB
CULTURE RESULTS: SIGNIFICANT CHANGE UP
SPECIMEN SOURCE: SIGNIFICANT CHANGE UP

## 2018-03-01 LAB
CULTURE RESULTS: SIGNIFICANT CHANGE UP
SPECIMEN SOURCE: SIGNIFICANT CHANGE UP

## 2018-03-08 ENCOUNTER — CHART COPY (OUTPATIENT)
Age: 73
End: 2018-03-08

## 2018-03-20 ENCOUNTER — APPOINTMENT (OUTPATIENT)
Dept: OTOLARYNGOLOGY | Facility: CLINIC | Age: 73
End: 2018-03-20
Payer: MEDICARE

## 2018-03-20 VITALS
WEIGHT: 189 LBS | SYSTOLIC BLOOD PRESSURE: 127 MMHG | HEART RATE: 69 BPM | DIASTOLIC BLOOD PRESSURE: 77 MMHG | BODY MASS INDEX: 27.06 KG/M2 | TEMPERATURE: 97.5 F | HEIGHT: 70 IN

## 2018-03-20 PROCEDURE — 99213 OFFICE O/P EST LOW 20 MIN: CPT | Mod: 25

## 2018-03-20 PROCEDURE — 31237 NSL/SINS NDSC SURG BX POLYPC: CPT | Mod: 50

## 2018-03-21 LAB
CULTURE RESULTS: SIGNIFICANT CHANGE UP
SPECIMEN SOURCE: SIGNIFICANT CHANGE UP

## 2018-04-24 ENCOUNTER — APPOINTMENT (OUTPATIENT)
Dept: OTOLARYNGOLOGY | Facility: CLINIC | Age: 73
End: 2018-04-24
Payer: MEDICARE

## 2018-04-24 VITALS
DIASTOLIC BLOOD PRESSURE: 83 MMHG | SYSTOLIC BLOOD PRESSURE: 142 MMHG | BODY MASS INDEX: 27.63 KG/M2 | HEART RATE: 64 BPM | WEIGHT: 193 LBS | HEIGHT: 70 IN

## 2018-04-24 PROCEDURE — 99214 OFFICE O/P EST MOD 30 MIN: CPT | Mod: 25

## 2018-04-24 PROCEDURE — 31231 NASAL ENDOSCOPY DX: CPT

## 2018-04-24 RX ORDER — LORAZEPAM 2 MG/1
2 TABLET ORAL DAILY
Qty: 10 | Refills: 0 | Status: ACTIVE | COMMUNITY
Start: 2018-04-24 | End: 1900-01-01

## 2018-05-22 ENCOUNTER — APPOINTMENT (OUTPATIENT)
Dept: OTOLARYNGOLOGY | Facility: CLINIC | Age: 73
End: 2018-05-22
Payer: MEDICARE

## 2018-05-22 PROCEDURE — 92557 COMPREHENSIVE HEARING TEST: CPT

## 2018-05-22 PROCEDURE — 92550 TYMPANOMETRY & REFLEX THRESH: CPT

## 2018-05-22 PROCEDURE — V5299A: CUSTOM | Mod: NC

## 2018-07-31 NOTE — ED PROVIDER NOTE - OBJECTIVE STATEMENT
----- Message from Jassi Story Jr., MD sent at 7/31/2018  8:29 AM CDT -----  Please notify pt of normal laboratory results. Your liver and liver enzymes are completely normal. I don't believe that the pain you are experiencing has anything to do with your liver. Stay on the omeprazole. If you continue to experience pain probably should be seen again.  
Pt aware of message below    
70 y/o, hx epistaxis, s/p skin graft 2001 and embolization 2008 presents stating has had nose bleed since last night.  Pt stating stops temporarily, although has passed some clots, and seemed to worsen this morning.  Denies headache, n/v, all other ROS negative.

## 2019-08-01 NOTE — ED PROVIDER NOTE - CONDUCTED A DETAILED DISCUSSION WITH PATIENT AND/OR GUARDIAN REGARDING, MDM
need to admit/lab results Airway patent, Nasal mucosa clear. Mouth with normal mucosa. Throat has no vesicles, no oropharyngeal exudates and uvula is midline.

## 2020-02-24 PROBLEM — I10 ESSENTIAL (PRIMARY) HYPERTENSION: Chronic | Status: ACTIVE | Noted: 2017-10-06

## 2020-02-24 PROBLEM — R04.0 EPISTAXIS: Chronic | Status: ACTIVE | Noted: 2018-01-27

## 2020-02-24 PROBLEM — E78.5 HYPERLIPIDEMIA, UNSPECIFIED: Chronic | Status: ACTIVE | Noted: 2017-10-06

## 2020-02-25 ENCOUNTER — APPOINTMENT (OUTPATIENT)
Dept: OTOLARYNGOLOGY | Facility: CLINIC | Age: 75
End: 2020-02-25
Payer: MEDICARE

## 2020-02-25 VITALS
HEART RATE: 62 BPM | SYSTOLIC BLOOD PRESSURE: 144 MMHG | DIASTOLIC BLOOD PRESSURE: 81 MMHG | HEIGHT: 70 IN | BODY MASS INDEX: 28.2 KG/M2 | WEIGHT: 197 LBS

## 2020-02-25 PROCEDURE — 31231 NASAL ENDOSCOPY DX: CPT

## 2020-02-25 PROCEDURE — 99214 OFFICE O/P EST MOD 30 MIN: CPT | Mod: 25

## 2020-02-26 NOTE — ASSESSMENT
[FreeTextEntry1] : 74M here in followup. He was last seen 4/2018. He has a history significant for sinonasal arteriovenous malformations and has undergone multiple angio/embo procedures in the past, most recently 1/2018 at [facility]. He had been doing very well since then, but over the past 4 days, he had 2 episodes of epistaxis - they were both right sided and each one lasted around 30 minutes; bleeding was from then front of his nose - he was not coughing up clots/coagulum from his mouth. Given new onset nosebleeding, he is here for evaluation. He is using the saline irrigations and has otherwise no other complaints.\par Nasal endoscopy today shows well healed, mucosalized nasal airways which are widely patent with mild crusting and left sided nonobstructive synechiae, but no coagulum or active bleeding; felt much better after debridement.\par Given history of sinonasal AVMs with new onset or recurrent right sided epistaxis, will send to Dr. Jorgensen this week for evaluation to set up angiogram and likely embolization of recurrent AVMs. This was all discussed at length w patient and his wife. I reiterated his unique disease process and why for this, in particular, IR is better for optimal management than endoscopic control of epistaxis in OR, at this point. They have appt with Dr. Jorgensen later this week.

## 2020-02-26 NOTE — PROCEDURE
[FreeTextEntry3] : Nasal Endoscopy:\par small anterosuperior septal perf, stable\par R NC: crusting removed. nasal airway widely patent, middle meatus patent, no mucopus or coagulum\par L NC: crusting and mucus removed. synechiae between IT and septum, between superior lateral wall and septum and between MT and septum, all nonobstructive. nasal airway widely patent, middle meatus grossly patent, no mucopus, no clot or coagulum\par choana w mild clear mucus removed

## 2020-02-26 NOTE — CONSULT LETTER
[Dear  ___] : Dear  [unfilled], [Courtesy Letter:] : I had the pleasure of seeing your patient, [unfilled], in my office today. [Consult Closing:] : Thank you very much for allowing me to participate in the care of this patient.  If you have any questions, please do not hesitate to contact me. [Sincerely,] : Sincerely, [DrErnst  ___] : Dr. WYATT [Jonny Louis MD] : Jonny Louis MD  [Department of Otolaryngology, Head and Neck Surgery] : Department of Otolaryngology, Head and Neck Surgery [[facility]] : [facility]

## 2020-02-26 NOTE — HISTORY OF PRESENT ILLNESS
[de-identified] : 74M here in followup.\par \par He was last seen 4/2018. He has h/o sinonasal arteriovenous malformations and has undergone multiple angio/embo procedures in the past, most recently 1/2018.\par He had been doing very well but over the past 4 days, he had 2 episodes of nosebleeds - they were right sided and each one lasted around 30 minutes; bleeding was from then front of his nose - he was not coughing up clots/coagulum from his mouth. Given new onset nosebleeding, they are here for evaluation.\par \par Since last seen he is using the saline irrigations and has otherwise no other complaints.\par \par He has medical h/o epistaxis due to sinonasal AVMs-\par Admitted to [facility] 1/27/18 as an international transfer in the setting of bilateral posterior packing for 5 days, altered mental status and fever. See prior notes for full details. Taken to OR whereupon nasal cavities were washed out and cultures taken. He was admitted for IV abx and improved progressively. He was taken to IR and underwent angio/embo of right distal facial AVM 1/31/18 and discharged 2/2/18.\par Cx: numerous klebsiella pneumoniae\par \par From prior notes: \par Frequent epistaxis in 1990s s/p multiple in-office cauterizations resulting in some nasal deformity for which he underwent septal grafting? at [facility] in 2001 w no further bleeding. In 2008 was severe epistaxis and underwent angio/embolization in [facility]. He did will until 10/2017 was admitted for persistent right epistaxis and is s/p IR angio/embolization of right sphenopalatine artery and right infraorbital artery AVM. \par MRI face 11/2017: No mass lesion or regional contrast enhancement to imply hyperemia or large venous shunting\par \par ROS otherwise unremarkable.

## 2020-02-26 NOTE — PHYSICAL EXAM
[Nasal Endoscopy Performed] : nasal endoscopy was performed, see procedure section for findings [de-identified] : ME clear no effusion b/l [Midline] : trachea located in midline position [Normal] : no rashes

## 2020-02-27 ENCOUNTER — APPOINTMENT (OUTPATIENT)
Dept: NEUROSURGERY | Facility: CLINIC | Age: 75
End: 2020-02-27
Payer: MEDICARE

## 2020-02-27 VITALS
RESPIRATION RATE: 18 BRPM | HEIGHT: 70 IN | DIASTOLIC BLOOD PRESSURE: 86 MMHG | SYSTOLIC BLOOD PRESSURE: 133 MMHG | BODY MASS INDEX: 28.2 KG/M2 | HEART RATE: 64 BPM | WEIGHT: 197 LBS | OXYGEN SATURATION: 98 %

## 2020-02-27 PROCEDURE — 99215 OFFICE O/P EST HI 40 MIN: CPT

## 2020-02-27 NOTE — PHYSICAL EXAM
[General Appearance - In No Acute Distress] : in no acute distress [General Appearance - Alert] : alert [Oriented To Time, Place, And Person] : oriented to person, place, and time [Impaired Insight] : insight and judgment were intact [Person] : oriented to person [Place] : oriented to place [Time] : oriented to time [] : no respiratory distress [Respiration, Rhythm And Depth] : normal respiratory rhythm and effort [Apical Impulse] : the apical impulse was normal [Heart Rate And Rhythm] : heart rate was normal and rhythm regular [Abnormal Walk] : normal gait [FreeTextEntry1] : no active epistaxis

## 2020-02-27 NOTE — END OF VISIT
[FreeTextEntry3] : Written by Kandy Barajas NP acting as a scribe for Dr. Reynaldo Jorgensen MD.  The documentation recorded by the scribe accurately reflects the service I personally performed and the decisions made by me, Dr. Reynaldo Jorgensen \par \par

## 2020-02-27 NOTE — ASSESSMENT
[FreeTextEntry1] : Izabela recently had 2 episodes of transient epistaxis from the right nostril.  We will proceed with an angiogram and further embolization.  The procedure, benefits and risks were explained.  The procedure is scheduled on March 4, 2020.\par \par \par Plan:\par 1. Femoral craniofacial angiogram, possible embolization of nasal AV shunt with Embosphere and NBCA on 3/4/2020. Will need PCP and Cardiology clearance.

## 2020-02-27 NOTE — REASON FOR VISIT
[Follow-Up: _____] : a [unfilled] follow-up visit [Other: _____] : [unfilled] [FreeTextEntry1] : TODAY'S VISIT:\par He presents to the office to discuss craniofacial angiogram, possible embolization of the small vessel type  AV shunting for recent 2 episodes of profuse RIGHT epistaxis. The last episode was on Sunday.

## 2020-02-27 NOTE — HISTORY OF PRESENT ILLNESS
[FreeTextEntry1] :  PMH of HLD, HTN, frequent epistaxis who has experienced multiple episodes of RIGHT side epistaxis over several decades requiring multiple cauterization and packing procedures. 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 Dr. Jorgensen performed  embolization with Embopsphere and NBCA. There was a feeder to the malformation that wasn't embolized because of collateral circulation to the right orbital region. An MRI was obtained which showed  small flow voids in the nasal region that may be related to the described small vessel type AV shunt.\par \par Patient developed another episode of profuse epistaxis from the RIGHT  nostril again in January 2018 while performing in [place]. He underwent nasal packing with balloon and was transferred to [facility]. On arrival to [facility] there was evidence of nasal infection and he was septic. He underwent emergent removal of the packing and management for sepsis. On January 31, 2018, he underwent endovascular embolization of the small vessel type AV shunting with Embosphere and NBCA. \par \par \par Handedness: \par \par Patient Address:\par [address] [place] [address], [address]\[place] [address]\par \par PCP:\par Dr. Alfonso Johnson\par [address]\[place] [address]\par \par Cardiologist:\par Dr. Miles Ogden\par [phone]\par [address]\par [place]\[address]\[place] [address]\par Fax. [phone]\par [email or web address]\par \par Dr. Jonny Louis\par [address]\[place] [address] \par \par

## 2020-02-27 NOTE — ADDENDUM
[FreeTextEntry1] : Dear Dr. Louis:\par \par I hope you are doing well.  We saw Mr. Duckworth in our office at [facility].  He was accompanied by his wife.  As you know, he is a 74 years-old man with history of hypertension and hyperlipidemia who has experienced multiple episodes of right side epistaxis over several decades requiring multiple cauterization and packing procedures.  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  We performed embolization with Embospheres and NBCA (glue).  There was a feeder to the malformation that wasn’t embolized because of collateral circulation to the right orbital region.  The procedure and recovery were uneventful.  We obtained an MRI that showed small flow voids in the nasal region that may be related to the described small vessel type AV shunt.  In 2018 he developed another episode of profuse epistaxis from the right nostril while performing in [place].  He underwent nasal packing with a balloon and was transferred to [facility].  On arrival to our hospital there was evidence of nasal infection and he was septic.  He underwent emergent removal of the packing and management for sepsis.  He also underwent endovascular embolization of the small vessel type arteriovenous shunting with Embospheres and NBCA (glue) on January 31, 2018.  He had a remarkable recovery.\par \par He recently had 2 episodes of transient epistaxis from the right nostril.  We will proceed with an angiogram and further embolization.  The procedure, benefits and risks were explained.  The procedure is scheduled on March 4, 2020.\par \par Thank you for allowing us to participate on Mr. Duckworth’s care.  I will keep you updated at all times.\par \par   \par Sincerely,\par \par \par Reynaldo Jorgensen MD\par Chief,\par Neuro-Endovascular Surgery and \par Interventional Neuroradiology \par [facility]\par [facility]\par [address]\par [address]\par [place] [id number]\par T:  [phone]\par F:  [phone]\par

## 2020-02-27 NOTE — REVIEW OF SYSTEMS
[Easy Bleeding] : a tendency for easy bleeding [As Noted in HPI] : as noted in HPI [Negative] : Musculoskeletal

## 2020-03-01 ENCOUNTER — INPATIENT (INPATIENT)
Facility: HOSPITAL | Age: 75
LOS: 2 days | Discharge: HOME | DRG: 272 | End: 2020-03-04
Attending: OTOLARYNGOLOGY | Admitting: OTOLARYNGOLOGY
Payer: MEDICARE

## 2020-03-01 VITALS
RESPIRATION RATE: 18 BRPM | DIASTOLIC BLOOD PRESSURE: 95 MMHG | SYSTOLIC BLOOD PRESSURE: 200 MMHG | HEART RATE: 81 BPM | OXYGEN SATURATION: 99 %

## 2020-03-01 LAB
ALBUMIN SERPL ELPH-MCNC: 4.4 G/DL — SIGNIFICANT CHANGE UP (ref 3.3–5)
ALP SERPL-CCNC: 71 U/L — SIGNIFICANT CHANGE UP (ref 40–120)
ALT FLD-CCNC: 17 U/L — SIGNIFICANT CHANGE UP (ref 10–45)
ANION GAP SERPL CALC-SCNC: 13 MMOL/L — SIGNIFICANT CHANGE UP (ref 5–17)
APTT BLD: 31.7 SEC — SIGNIFICANT CHANGE UP (ref 27.5–36.3)
AST SERPL-CCNC: 29 U/L — SIGNIFICANT CHANGE UP (ref 10–40)
BASOPHILS # BLD AUTO: 0.02 K/UL — SIGNIFICANT CHANGE UP (ref 0–0.2)
BASOPHILS NFR BLD AUTO: 0.2 % — SIGNIFICANT CHANGE UP (ref 0–2)
BILIRUB SERPL-MCNC: 1.2 MG/DL — SIGNIFICANT CHANGE UP (ref 0.2–1.2)
BLD GP AB SCN SERPL QL: NEGATIVE — SIGNIFICANT CHANGE UP
BUN SERPL-MCNC: 17 MG/DL — SIGNIFICANT CHANGE UP (ref 7–23)
CALCIUM SERPL-MCNC: 9.1 MG/DL — SIGNIFICANT CHANGE UP (ref 8.4–10.5)
CHLORIDE SERPL-SCNC: 102 MMOL/L — SIGNIFICANT CHANGE UP (ref 96–108)
CO2 SERPL-SCNC: 23 MMOL/L — SIGNIFICANT CHANGE UP (ref 22–31)
CREAT SERPL-MCNC: 0.99 MG/DL — SIGNIFICANT CHANGE UP (ref 0.5–1.3)
EOSINOPHIL # BLD AUTO: 0.03 K/UL — SIGNIFICANT CHANGE UP (ref 0–0.5)
EOSINOPHIL NFR BLD AUTO: 0.4 % — SIGNIFICANT CHANGE UP (ref 0–6)
GLUCOSE SERPL-MCNC: 200 MG/DL — HIGH (ref 70–99)
HCT VFR BLD CALC: 45 % — SIGNIFICANT CHANGE UP (ref 39–50)
HGB BLD-MCNC: 14.6 G/DL — SIGNIFICANT CHANGE UP (ref 13–17)
IMM GRANULOCYTES NFR BLD AUTO: 0.5 % — SIGNIFICANT CHANGE UP (ref 0–1.5)
INR BLD: 1.09 — SIGNIFICANT CHANGE UP (ref 0.88–1.16)
LYMPHOCYTES # BLD AUTO: 1.75 K/UL — SIGNIFICANT CHANGE UP (ref 1–3.3)
LYMPHOCYTES # BLD AUTO: 20.9 % — SIGNIFICANT CHANGE UP (ref 13–44)
MCHC RBC-ENTMCNC: 29 PG — SIGNIFICANT CHANGE UP (ref 27–34)
MCHC RBC-ENTMCNC: 32.4 GM/DL — SIGNIFICANT CHANGE UP (ref 32–36)
MCV RBC AUTO: 89.5 FL — SIGNIFICANT CHANGE UP (ref 80–100)
MONOCYTES # BLD AUTO: 0.59 K/UL — SIGNIFICANT CHANGE UP (ref 0–0.9)
MONOCYTES NFR BLD AUTO: 7.1 % — SIGNIFICANT CHANGE UP (ref 2–14)
NEUTROPHILS # BLD AUTO: 5.93 K/UL — SIGNIFICANT CHANGE UP (ref 1.8–7.4)
NEUTROPHILS NFR BLD AUTO: 70.9 % — SIGNIFICANT CHANGE UP (ref 43–77)
NRBC # BLD: 0 /100 WBCS — SIGNIFICANT CHANGE UP (ref 0–0)
PLATELET # BLD AUTO: 227 K/UL — SIGNIFICANT CHANGE UP (ref 150–400)
POTASSIUM SERPL-MCNC: 4.3 MMOL/L — SIGNIFICANT CHANGE UP (ref 3.5–5.3)
POTASSIUM SERPL-SCNC: 4.3 MMOL/L — SIGNIFICANT CHANGE UP (ref 3.5–5.3)
PROT SERPL-MCNC: 7.5 G/DL — SIGNIFICANT CHANGE UP (ref 6–8.3)
PROTHROM AB SERPL-ACNC: 12.5 SEC — SIGNIFICANT CHANGE UP (ref 10–12.9)
RBC # BLD: 5.03 M/UL — SIGNIFICANT CHANGE UP (ref 4.2–5.8)
RBC # FLD: 12.6 % — SIGNIFICANT CHANGE UP (ref 10.3–14.5)
RH IG SCN BLD-IMP: POSITIVE — SIGNIFICANT CHANGE UP
SODIUM SERPL-SCNC: 138 MMOL/L — SIGNIFICANT CHANGE UP (ref 135–145)
WBC # BLD: 8.36 K/UL — SIGNIFICANT CHANGE UP (ref 3.8–10.5)
WBC # FLD AUTO: 8.36 K/UL — SIGNIFICANT CHANGE UP (ref 3.8–10.5)

## 2020-03-01 PROCEDURE — 30903 CONTROL OF NOSEBLEED: CPT

## 2020-03-01 PROCEDURE — 93010 ELECTROCARDIOGRAM REPORT: CPT | Mod: 59

## 2020-03-01 PROCEDURE — 99222 1ST HOSP IP/OBS MODERATE 55: CPT

## 2020-03-01 PROCEDURE — 99285 EMERGENCY DEPT VISIT HI MDM: CPT | Mod: 25

## 2020-03-01 RX ORDER — LOSARTAN POTASSIUM 100 MG/1
50 TABLET, FILM COATED ORAL DAILY
Refills: 0 | Status: DISCONTINUED | OUTPATIENT
Start: 2020-03-02 | End: 2020-03-04

## 2020-03-01 RX ORDER — SIMVASTATIN 20 MG/1
30 TABLET, FILM COATED ORAL ONCE
Refills: 0 | Status: COMPLETED | OUTPATIENT
Start: 2020-03-01 | End: 2020-03-01

## 2020-03-01 RX ORDER — MORPHINE SULFATE 50 MG/1
2 CAPSULE, EXTENDED RELEASE ORAL EVERY 4 HOURS
Refills: 0 | Status: DISCONTINUED | OUTPATIENT
Start: 2020-03-01 | End: 2020-03-04

## 2020-03-01 RX ORDER — ACETAMINOPHEN 500 MG
650 TABLET ORAL EVERY 6 HOURS
Refills: 0 | Status: DISCONTINUED | OUTPATIENT
Start: 2020-03-01 | End: 2020-03-02

## 2020-03-01 RX ORDER — SODIUM CHLORIDE 9 MG/ML
1000 INJECTION, SOLUTION INTRAVENOUS
Refills: 0 | Status: DISCONTINUED | OUTPATIENT
Start: 2020-03-01 | End: 2020-03-02

## 2020-03-01 RX ORDER — SIMVASTATIN 20 MG/1
30 TABLET, FILM COATED ORAL AT BEDTIME
Refills: 0 | Status: DISCONTINUED | OUTPATIENT
Start: 2020-03-02 | End: 2020-03-04

## 2020-03-01 RX ORDER — OXYCODONE HYDROCHLORIDE 5 MG/1
5 TABLET ORAL EVERY 6 HOURS
Refills: 0 | Status: DISCONTINUED | OUTPATIENT
Start: 2020-03-01 | End: 2020-03-04

## 2020-03-01 RX ORDER — METOPROLOL TARTRATE 50 MG
50 TABLET ORAL DAILY
Refills: 0 | Status: DISCONTINUED | OUTPATIENT
Start: 2020-03-02 | End: 2020-03-04

## 2020-03-01 NOTE — ED PROVIDER NOTE - CLINICAL SUMMARY MEDICAL DECISION MAKING FREE TEXT BOX
75 y/o male with right nasal mild epistaxis today without pain or discomfort. Vs nml. Labs wnml. HDS without signs of anemia. Neuro intact. Mild "ooze". Rhino -rocket place with improvement.  As per Neurosurgery and ENT, plan for admission and NPO after midnight and plan for embolization in the am.

## 2020-03-01 NOTE — ED PROVIDER NOTE - OBJECTIVE STATEMENT
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 Pt states the bleeding started this morning and is like a "slow ooze". He has been trying to apply pressure b/l without improvement of the bleeding. He denies any associating symptoms to include rhinorrhea, cough, recent illness, fall/trauma and denies current blood thinner use.

## 2020-03-01 NOTE — ED ADULT NURSE NOTE - CHPI ED NUR SYMPTOMS NEG
no weakness/no syncope/no fever/no chills/no loss of consciousness/no nausea/no numbness/no vomiting/no bleeding gums

## 2020-03-01 NOTE — PATIENT PROFILE ADULT - BRADEN ACTIVITY
"Anesthesia Post Evaluation    Patient: Srinivasan Munoz    Procedure(s) Performed: Procedure(s) (LRB):  PHACOEMULSIFICATION-ASPIRATION-CATARACT (Left)  INSERTION-INTRAOCULAR LENS (IOL) (Left)    Final Anesthesia Type: MAC  Patient location during evaluation: OPS  Patient participation: Yes- Able to Participate  Level of consciousness: awake and alert and oriented  Post-procedure vital signs: reviewed and stable  Pain management: adequate  Airway patency: patent  PONV status at discharge: No PONV  Anesthetic complications: no      Cardiovascular status: stable  Respiratory status: unassisted, spontaneous ventilation and room air  Hydration status: euvolemic  Follow-up not needed.        Visit Vitals  BP (!) 173/70 (BP Location: Left arm, Patient Position: Sitting)   Pulse 83   Temp 36.8 °C (98.2 °F) (Oral)   Resp 18   Ht 6' 1" (1.854 m)   Wt (!) 139.7 kg (308 lb)   SpO2 96%   BMI 40.64 kg/m²       Pain/Nica Score: Pain Assessment Performed: Yes (6/25/2018  6:20 AM)  Presence of Pain: denies (6/25/2018  6:20 AM)      " (3) walks occasionally

## 2020-03-01 NOTE — ED ADULT NURSE NOTE - OBJECTIVE STATEMENT
Pt came in from home c/o epistaxis starting at 4pm. Pt with hx of recurrent epistaxis and has planned procedure this week. However since epistaxis occurred today, spoke with ENT Dr Jorgensen and was told to come to ED for admission and procedure on 3/2/2020.

## 2020-03-01 NOTE — ED PROVIDER NOTE - ATTENDING CONTRIBUTION TO CARE
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 Pt states the bleeding started this morning and is like a "slow ooze". He has been trying to apply pressure b/l without improvement of the bleeding. He denies any associating symptoms to include rhinorrhea, cough, recent illness, fall/trauma and denies current blood thinner use.    Agree with hpi and pe as documented by pa  pt with slight oozing on exam no massive bleeding  pt seen by ent who requests admission for or intervention

## 2020-03-02 PROCEDURE — 36223 PLACE CATH CAROTID/INOM ART: CPT | Mod: RT

## 2020-03-02 PROCEDURE — 99239 HOSP IP/OBS DSCHRG MGMT >30: CPT

## 2020-03-02 PROCEDURE — 61626 TCAT PERM OCCLS/EMBOL NONCNS: CPT

## 2020-03-02 PROCEDURE — 36227 PLACE CATH XTRNL CAROTID: CPT | Mod: RT

## 2020-03-02 PROCEDURE — 75898 FOLLOW-UP ANGIOGRAPHY: CPT | Mod: 26

## 2020-03-02 PROCEDURE — 75894 X-RAYS TRANSCATH THERAPY: CPT | Mod: 26

## 2020-03-02 RX ORDER — ZOLPIDEM TARTRATE 10 MG/1
5 TABLET ORAL ONCE
Refills: 0 | Status: DISCONTINUED | OUTPATIENT
Start: 2020-03-02 | End: 2020-03-02

## 2020-03-02 RX ORDER — ONDANSETRON 8 MG/1
4 TABLET, FILM COATED ORAL EVERY 8 HOURS
Refills: 0 | Status: DISCONTINUED | OUTPATIENT
Start: 2020-03-02 | End: 2020-03-04

## 2020-03-02 RX ORDER — SODIUM CHLORIDE 9 MG/ML
1000 INJECTION INTRAMUSCULAR; INTRAVENOUS; SUBCUTANEOUS
Refills: 0 | Status: DISCONTINUED | OUTPATIENT
Start: 2020-03-02 | End: 2020-03-02

## 2020-03-02 RX ORDER — ACETAMINOPHEN 500 MG
650 TABLET ORAL EVERY 6 HOURS
Refills: 0 | Status: DISCONTINUED | OUTPATIENT
Start: 2020-03-02 | End: 2020-03-04

## 2020-03-02 RX ADMIN — ZOLPIDEM TARTRATE 5 MILLIGRAM(S): 10 TABLET ORAL at 23:20

## 2020-03-02 RX ADMIN — Medication 650 MILLIGRAM(S): at 07:34

## 2020-03-02 RX ADMIN — Medication 650 MILLIGRAM(S): at 08:34

## 2020-03-02 RX ADMIN — Medication 50 MILLIGRAM(S): at 05:24

## 2020-03-02 RX ADMIN — SIMVASTATIN 30 MILLIGRAM(S): 20 TABLET, FILM COATED ORAL at 22:18

## 2020-03-02 RX ADMIN — SIMVASTATIN 30 MILLIGRAM(S): 20 TABLET, FILM COATED ORAL at 00:01

## 2020-03-02 RX ADMIN — LOSARTAN POTASSIUM 50 MILLIGRAM(S): 100 TABLET, FILM COATED ORAL at 05:24

## 2020-03-02 RX ADMIN — Medication 0.5 MILLIGRAM(S): at 00:01

## 2020-03-02 NOTE — CONSULT NOTE ADULT - SUBJECTIVE AND OBJECTIVE BOX
NP Note Neuroendovascualr  Dr Jorgensen    History of Present Illness:	  HPI: 72M with hx of recurrent epistaxis 2/2 arterio-venous shunting requiring angio embolization x2, most recently R SPA angioembolization at [facility] 3 months ago.  Since that time patient's epistaxis have been well controlled without further episodes of bleeding.  [place]axis which resolved after 2 hours with pressure.   On a subsequent flight epistaxis recurred, lasting ~4 hours and patient was brought to hospital where packing was placed 1/23.  Dring hospital stay patient was given valium for anxiety and PRN medications for BP control.    Pt come to Er with recurrent Epistaxis  ENT consulted and packed the pt   Stable and now for preop ANgio Embo of recurrent Epistaxis  Allergies and Intolerances:        Allergies:  	Vibramycin: Drug, Hives    Home Medications:   * Patient Currently Takes Medications as of 07-Oct-2017 13:44 documented in Structured Notes  · 	LORazepam 0.5 mg oral tablet: 1 tab(s) orally every 6 hours, As needed, Anxiety  · 	bacitracin 500 units/g topical ointment: Apply topically to bilateral nares twice daily   · 	Colace 50 mg oral capsule: 1 cap(s) orally 2 times a day   · 	Toprol-XL 25 mg oral tablet, extended release: 1 tab(s) orally once a day  · 	Cozaar 50 mg oral tablet: 1 tab(s) orally once a day    .    Patient History:   Past Medical History:  Epistaxis, recurrent    Hyperlipidemia, unspecified hyperlipidemia type    Hypertension, unspecified type.    Social History:  Social History (marital status, living situation, occupation, tobacco use, alcohol and drug use, and sexual history): hx of smoking, denies drug use	      PAST MEDICAL & SURGICAL HISTORY:  Epistaxis, recurrent  Hyperlipidemia, unspecified hyperlipidemia type  Hypertension, unspecified type    FAMILY HISTORY:      SOCIAL HISTORY:  Tobacco Use:  EtOH use:   Substance:    Allergies    Vibramycin (Hives)    Intolerances        REVIEW OF SYSTEMS  General:  well groomed  Skin/Breast:  warm and dry  Ophthalmologic:  visual fields intact  PUlm: Lungs CTAB  Respirations regular and unlabored  CV::	RRR  Gastrointestinal:	  Abdomen round and soft  Genitourinary:	Voiding without difficulty    Neurological:	A&OX 3 Cranial nerves intact  LIRA  	    MEDICATIONS:  Antibiotics:    Neuro:  acetaminophen   Tablet .. 650 milliGRAM(s) Oral every 6 hours PRN  morphine  - Injectable 2 milliGRAM(s) IV Push every 4 hours PRN  oxyCODONE    IR 5 milliGRAM(s) Oral every 6 hours PRN    Anticoagulation:    OTHER:  losartan 50 milliGRAM(s) Oral daily  metoprolol succinate ER 50 milliGRAM(s) Oral daily  simvastatin 30 milliGRAM(s) Oral at bedtime    IVF:  lactated ringers. 1000 milliLiter(s) IV Continuous <Continuous>      Vital Signs Last 24 Hrs  T(C): 36.3 (01 Mar 2020 23:20), Max: 36.4 (01 Mar 2020 23:05)  T(F): 97.3 (01 Mar 2020 23:20), Max: 97.6 (01 Mar 2020 23:05)  HR: 67 (01 Mar 2020 23:20) (67 - 82)  BP: 157/87 (01 Mar 2020 23:20) (155/76 - 200/95)  BP(mean): --  RR: 16 (01 Mar 2020 23:20) (16 - 18)  SpO2: 96% (01 Mar 2020 23:20) (96% - 99%)    PHYSICAL EXAM:  Constitutional:  Eyes:  ENMT:  Neck:  Back:  Respiratory:  Cardiovascular:  Gastrointestinal:  Genitourinary:  Rectal:  Vascular:  Neurological:  Skin:    LABS:                        14.6   8.36  )-----------( 227      ( 01 Mar 2020 21:35 )             45.0     03-01    138  |  102  |  17  ----------------------------<  200<H>  4.3   |  23  |  0.99    Ca    9.1      01 Mar 2020 21:35    TPro  7.5  /  Alb  4.4  /  TBili  1.2  /  DBili  x   /  AST  29  /  ALT  17  /  AlkPhos  71  03-01    PT/INR - ( 01 Mar 2020 21:35 )   PT: 12.5 sec;   INR: 1.09          PTT - ( 01 Mar 2020 21:35 )  PTT:31.7 sec    CULTURES:      RADIOLOGY & ADDITIONAL STUDIES: NP Note Neuroendovascualr  Dr Jorgensen    History of Present Illness:	  HPI: 72M with hx of recurrent epistaxis 2/2 arterio-venous shunting requiring angio embolization x2, most recently R SPA angioembolization at [facility] 3 months ago.  Since that time patient's epistaxis have been well controlled without further episodes of bleeding.  [place] brisk epistaxis which resolved after 2 hours with pressure.   On a subsequent flight epistaxis recurred, lasting ~4 hours and patient was brought to hospital where packing was placed 1/23.  Dring hospital stay patient was given valium for anxiety and PRN medications for BP control.    Pt come to Er with recurrent Epistaxis  ENT consulted and packed the pt   Stable and now for preop ANgio Embo of recurrent Epistaxis  Allergies and Intolerances:        Allergies:  	Vibramycin: Drug, Hives    Home Medications:   * Patient Currently Takes Medications as of 07-Oct-2017 13:44 documented in Structured Notes  · 	LORazepam 0.5 mg oral tablet: 1 tab(s) orally every 6 hours, As needed, Anxiety  · 	bacitracin 500 units/g topical ointment: Apply topically to bilateral nares twice daily   · 	Colace 50 mg oral capsule: 1 cap(s) orally 2 times a day   · 	Toprol-XL 25 mg oral tablet, extended release: 1 tab(s) orally once a day  · 	Cozaar 50 mg oral tablet: 1 tab(s) orally once a day    .    Patient History:   Past Medical History:  Epistaxis, recurrent    Hyperlipidemia, unspecified hyperlipidemia type    Hypertension, unspecified type.    Social History:  Social History (marital status, living situation, occupation, tobacco use, alcohol and drug use, and sexual history): hx of smoking, denies drug use	      PAST MEDICAL & SURGICAL HISTORY:  Epistaxis, recurrent  Hyperlipidemia, unspecified hyperlipidemia type  Hypertension, unspecified type      Allergies  Vibramycin (Hives)    Intolerances      REVIEW OF SYSTEMS  General:  well groomed  Skin/Breast:  warm and dry  Ophthalmologic:  visual fields intact  PUlm: Lungs CTAB  Respirations regular and unlabored  CV::	RRR  Gastrointestinal:	  Abdomen round and soft  Genitourinary:	Voiding without difficulty  Neurological:	A&OX 3 Cranial nerves intact  LIRA 5/5 no drift or dysmetria  	    MEDICATIONS:  Antibiotics:    Neuro:  acetaminophen   Tablet .. 650 milliGRAM(s) Oral every 6 hours PRN  morphine  - Injectable 2 milliGRAM(s) IV Push every 4 hours PRN  oxyCODONE    IR 5 milliGRAM(s) Oral every 6 hours PRN    Anticoagulation:    OTHER:  losartan 50 milliGRAM(s) Oral daily  metoprolol succinate ER 50 milliGRAM(s) Oral daily  simvastatin 30 milliGRAM(s) Oral at bedtime    IVF:  lactated ringers. 1000 milliLiter(s) IV Continuous <Continuous>      Vital Signs Last 24 Hrs  T(C): 36.3 (01 Mar 2020 23:20), Max: 36.4 (01 Mar 2020 23:05)  T(F): 97.3 (01 Mar 2020 23:20), Max: 97.6 (01 Mar 2020 23:05)  HR: 67 (01 Mar 2020 23:20) (67 - 82)  BP: 157/87 (01 Mar 2020 23:20) (155/76 - 200/95)  BP(mean): --  RR: 16 (01 Mar 2020 23:20) (16 - 18)  SpO2: 96% (01 Mar 2020 23:20) (96% - 99%)      Physical Exam    Neuro A&OX3 Cranial nerves intact  LIRA  Bilateral nare packed  No active bleeidng a this timec  LABS:                        14.6   8.36  )-----------( 227      ( 01 Mar 2020 21:35 )             45.0     03-01    138  |  102  |  17  ----------------------------<  200<H>  4.3   |  23  |  0.99    Ca    9.1      01 Mar 2020 21:35    TPro  7.5  /  Alb  4.4  /  TBili  1.2  /  DBili  x   /  AST  29  /  ALT  17  /  AlkPhos  71  03-01    PT/INR - ( 01 Mar 2020 21:35 )   PT: 12.5 sec;   INR: 1.09          PTT - ( 01 Mar 2020 21:35 )  PTT:31.7 sec    CULTURES:      RADIOLOGY & ADDITIONAL STUDIES:

## 2020-03-02 NOTE — PROGRESS NOTE ADULT - SUBJECTIVE AND OBJECTIVE BOX
NEUROSURGERY POST OP NOTE:    POD# 0 S/P cerebral angiogram for embolization of right sphenopallatine and right infraorbital artery    S: patient evaluate at bedside, awake and alert, endorsing mild groin site pain      T(C): 36.4 (03-02-20 @ 08:41), Max: 36.7 (03-02-20 @ 05:15)  HR: 66 (03-02-20 @ 08:41) (66 - 82)  BP: 158/81 (03-02-20 @ 08:41) (149/75 - 200/95)  RR: 17 (03-02-20 @ 08:41) (15 - 18)  SpO2: 96% (03-02-20 @ 08:41) (96% - 99%)      03-01-20 @ 07:01  -  03-02-20 @ 07:00  --------------------------------------------------------  IN: 0 mL / OUT: 800 mL / NET: -800 mL        acetaminophen   Tablet .. 650 milliGRAM(s) Oral every 6 hours PRN  losartan 50 milliGRAM(s) Oral daily  metoprolol succinate ER 50 milliGRAM(s) Oral daily  morphine  - Injectable 2 milliGRAM(s) IV Push every 4 hours PRN  ondansetron Injectable 4 milliGRAM(s) IV Push every 8 hours PRN  oxyCODONE    IR 5 milliGRAM(s) Oral every 6 hours PRN  simvastatin 30 milliGRAM(s) Oral at bedtime  sodium chloride 0.9%. 1000 milliLiter(s) IV Continuous <Continuous>      RADIOLOGY:     Exam:  Gen: laying in hospital bed, NAD  Neuro: AA+Ox3, OE to verbal stimulus, FC  CN II-XII: PERRL, EOMI  Motor: MAEx4, good strength throughout  SILT in UE and LE b/l  Groin site C/D/I  DP 2+ b/l      Assessment:   75yo Male with PMHx recurrent AVM, left nare epistaxis, now s/p cerebral angiogram for embolization of right sphenopallatine and right infraorbital artery POD #0    Plan:  - neuro checks  - vitals checks  - pain control  - bed rest x4 hours  - NPO while flat, then ADAT   - IVF while NPO  - medical care as per primary team  - page neurosurgery with any questions or concerns: [phone]    d/w Dr. Jorgensen

## 2020-03-02 NOTE — CONSULT NOTE ADULT - ASSESSMENT
A/P  Epistaxis    Admit to ENT   Preop for Angio Embo today  NPO/IVF  consent signed  Follow up Medical clearance  Continue current medical regime    Dispo:Discussed with attending

## 2020-03-02 NOTE — BRIEF OPERATIVE NOTE - NSICDXBRIEFPROCEDURE_GEN_ALL_CORE_FT
PROCEDURES:  Angiogram, cerebral, with embolization 02-Mar-2020 13:54:12 Right sphenopallatine and right infraorbital artery Harsh Loredo

## 2020-03-02 NOTE — H&P ADULT - ASSESSMENT
HPI: M know to ENT and neuroIR services. Pt with hx of congenital nasal AVMs with recurrent epistaxis, s/p multiple IR embolizations. Pt presented to [facility] ED due to persistent bleeding from left nares not controlled with decongestant and pressure at home. Pt initially planned for ambulatory IR embolization later this week, but due to persistent bleeding not controlled at home, pt will be admitted with plan for embolization, likely tomorrow 3/02/2020 with Dr. Jorgensen. Pt's left nares packed by ED with rhino rocket, subsequent control of hemostaxis, pre-operative labs drawn while pt in ED. Pt denies any dizziness, SOB, palpitations concerning for symptomatic anemia. Pt denies CP, SOB, n/v, subjective fevers/chills.     PMH/PSH:   COngenital nasal AVMs, recurrent epistaxis s/p IR embolizations  HTN, HLD    MEDICATIONS  (STANDING):  lactated ringers. 1000 milliLiter(s) (75 mL/Hr) IV Continuous <Continuous>  losartan 50 milliGRAM(s) Oral daily  metoprolol succinate ER 50 milliGRAM(s) Oral daily  simvastatin 30 milliGRAM(s) Oral at bedtime    MEDICATIONS  (PRN):  acetaminophen   Tablet .. 650 milliGRAM(s) Oral every 6 hours PRN Temp greater or equal to 38C (100.4F), Mild Pain (1 - 3)  morphine  - Injectable 2 milliGRAM(s) IV Push every 4 hours PRN Severe Pain (7 - 10)  oxyCODONE    IR 5 milliGRAM(s) Oral every 6 hours PRN Moderate Pain (4 - 6)    Exam:   AF/VSS  NAD, A&Ox3, breathing comfortably on RA  Nares: left-sided rhino rocket in place, no anterior bleeding noted bilaterally  OC/OP: no masses or lesions noted; posterior OP clear, without active bleeding from NP or retained clot  Neck: soft, flat, FROM    Labs/imaging: reviewed                     14.6   8.36  )-----------( 227      ( 01 Mar 2020 21:35 )             45.0     03-01  138  |  102  |  17  ----------------------------<  200<H>  4.3   |  23  |  0.99    Ca    9.1      01 Mar 2020 21:35  TPro  7.5  /  Alb  4.4  /  TBili  1.2  /  DBili  x   /  AST  29  /  ALT  17  /  AlkPhos  71  03-01    PT/INR - ( 01 Mar 2020 21:35 )   PT: 12.5 sec;   INR: 1.09     PTT - ( 01 Mar 2020 21:35 )  PTT:31.7 sec    Active T&S    A/P: M with congential nasal AVMs with recurrent epistaxis, hemostatic with pack in left nares  - Admit to ENT under Dr. Louis -- [facility] bed  - F/U NeuroIR re: plan for embolization today, 3/02  - Pre-op labs complete  - Continue pain regimen as ordered  - Continue home meds as ordered  - NPO, IVF  - Ambulate as tolerated  - VS per unit protocol  - SCDs  - Please page ENT with questions or concerns

## 2020-03-03 ENCOUNTER — TRANSCRIPTION ENCOUNTER (OUTPATIENT)
Age: 75
End: 2020-03-03

## 2020-03-03 LAB
ANION GAP SERPL CALC-SCNC: 11 MMOL/L — SIGNIFICANT CHANGE UP (ref 5–17)
APPEARANCE UR: CLEAR — SIGNIFICANT CHANGE UP
BILIRUB UR-MCNC: NEGATIVE — SIGNIFICANT CHANGE UP
BUN SERPL-MCNC: 21 MG/DL — SIGNIFICANT CHANGE UP (ref 7–23)
CALCIUM SERPL-MCNC: 8.8 MG/DL — SIGNIFICANT CHANGE UP (ref 8.4–10.5)
CHLORIDE SERPL-SCNC: 101 MMOL/L — SIGNIFICANT CHANGE UP (ref 96–108)
CO2 SERPL-SCNC: 23 MMOL/L — SIGNIFICANT CHANGE UP (ref 22–31)
COLOR SPEC: YELLOW — SIGNIFICANT CHANGE UP
CREAT SERPL-MCNC: 1.06 MG/DL — SIGNIFICANT CHANGE UP (ref 0.5–1.3)
DIFF PNL FLD: NEGATIVE — SIGNIFICANT CHANGE UP
GLUCOSE SERPL-MCNC: 110 MG/DL — HIGH (ref 70–99)
GLUCOSE UR QL: NEGATIVE — SIGNIFICANT CHANGE UP
HCT VFR BLD CALC: 37.7 % — LOW (ref 39–50)
HCV AB S/CO SERPL IA: 0.14 S/CO — SIGNIFICANT CHANGE UP
HCV AB SERPL-IMP: SIGNIFICANT CHANGE UP
HGB BLD-MCNC: 12.7 G/DL — LOW (ref 13–17)
KETONES UR-MCNC: NEGATIVE — SIGNIFICANT CHANGE UP
LEUKOCYTE ESTERASE UR-ACNC: NEGATIVE — SIGNIFICANT CHANGE UP
MAGNESIUM SERPL-MCNC: 1.9 MG/DL — SIGNIFICANT CHANGE UP (ref 1.6–2.6)
MCHC RBC-ENTMCNC: 29.8 PG — SIGNIFICANT CHANGE UP (ref 27–34)
MCHC RBC-ENTMCNC: 33.7 GM/DL — SIGNIFICANT CHANGE UP (ref 32–36)
MCV RBC AUTO: 88.5 FL — SIGNIFICANT CHANGE UP (ref 80–100)
NITRITE UR-MCNC: NEGATIVE — SIGNIFICANT CHANGE UP
NRBC # BLD: 0 /100 WBCS — SIGNIFICANT CHANGE UP (ref 0–0)
PH UR: 6.5 — SIGNIFICANT CHANGE UP (ref 5–8)
PHOSPHATE SERPL-MCNC: 3 MG/DL — SIGNIFICANT CHANGE UP (ref 2.5–4.5)
PLATELET # BLD AUTO: 187 K/UL — SIGNIFICANT CHANGE UP (ref 150–400)
POTASSIUM SERPL-MCNC: 4.2 MMOL/L — SIGNIFICANT CHANGE UP (ref 3.5–5.3)
POTASSIUM SERPL-SCNC: 4.2 MMOL/L — SIGNIFICANT CHANGE UP (ref 3.5–5.3)
PROT UR-MCNC: NEGATIVE MG/DL — SIGNIFICANT CHANGE UP
RBC # BLD: 4.26 M/UL — SIGNIFICANT CHANGE UP (ref 4.2–5.8)
RBC # FLD: 12.9 % — SIGNIFICANT CHANGE UP (ref 10.3–14.5)
SODIUM SERPL-SCNC: 135 MMOL/L — SIGNIFICANT CHANGE UP (ref 135–145)
SP GR SPEC: 1.01 — SIGNIFICANT CHANGE UP (ref 1–1.03)
UROBILINOGEN FLD QL: 0.2 E.U./DL — SIGNIFICANT CHANGE UP
WBC # BLD: 10.31 K/UL — SIGNIFICANT CHANGE UP (ref 3.8–10.5)
WBC # FLD AUTO: 10.31 K/UL — SIGNIFICANT CHANGE UP (ref 3.8–10.5)

## 2020-03-03 PROCEDURE — 99231 SBSQ HOSP IP/OBS SF/LOW 25: CPT

## 2020-03-03 RX ORDER — POLYETHYLENE GLYCOL 3350 17 G/17G
17 POWDER, FOR SOLUTION ORAL
Refills: 0 | Status: DISCONTINUED | OUTPATIENT
Start: 2020-03-03 | End: 2020-03-04

## 2020-03-03 RX ORDER — HEPARIN SODIUM 5000 [USP'U]/ML
5000 INJECTION INTRAVENOUS; SUBCUTANEOUS EVERY 8 HOURS
Refills: 0 | Status: DISCONTINUED | OUTPATIENT
Start: 2020-03-03 | End: 2020-03-04

## 2020-03-03 RX ORDER — SENNA PLUS 8.6 MG/1
2 TABLET ORAL ONCE
Refills: 0 | Status: COMPLETED | OUTPATIENT
Start: 2020-03-03 | End: 2020-03-03

## 2020-03-03 RX ORDER — ZOLPIDEM TARTRATE 10 MG/1
5 TABLET ORAL AT BEDTIME
Refills: 0 | Status: DISCONTINUED | OUTPATIENT
Start: 2020-03-03 | End: 2020-03-04

## 2020-03-03 RX ORDER — POLYETHYLENE GLYCOL 3350 17 G/17G
17 POWDER, FOR SOLUTION ORAL AT BEDTIME
Refills: 0 | Status: DISCONTINUED | OUTPATIENT
Start: 2020-03-03 | End: 2020-03-03

## 2020-03-03 RX ORDER — POLYETHYLENE GLYCOL 3350 17 G/17G
17 POWDER, FOR SOLUTION ORAL ONCE
Refills: 0 | Status: COMPLETED | OUTPATIENT
Start: 2020-03-03 | End: 2020-03-03

## 2020-03-03 RX ORDER — TAMSULOSIN HYDROCHLORIDE 0.4 MG/1
1 CAPSULE ORAL
Qty: 30 | Refills: 0
Start: 2020-03-03

## 2020-03-03 RX ORDER — TAMSULOSIN HYDROCHLORIDE 0.4 MG/1
0.4 CAPSULE ORAL ONCE
Refills: 0 | Status: COMPLETED | OUTPATIENT
Start: 2020-03-03 | End: 2020-03-03

## 2020-03-03 RX ORDER — POLYETHYLENE GLYCOL 3350 17 G/17G
17 POWDER, FOR SOLUTION ORAL ONCE
Refills: 0 | Status: DISCONTINUED | OUTPATIENT
Start: 2020-03-03 | End: 2020-03-03

## 2020-03-03 RX ORDER — LIDOCAINE 4 G/100G
1 CREAM TOPICAL
Refills: 0 | Status: DISCONTINUED | OUTPATIENT
Start: 2020-03-03 | End: 2020-03-04

## 2020-03-03 RX ORDER — SODIUM CHLORIDE 0.65 %
2 AEROSOL, SPRAY (ML) NASAL
Refills: 0 | Status: DISCONTINUED | OUTPATIENT
Start: 2020-03-03 | End: 2020-03-04

## 2020-03-03 RX ADMIN — HEPARIN SODIUM 5000 UNIT(S): 5000 INJECTION INTRAVENOUS; SUBCUTANEOUS at 21:35

## 2020-03-03 RX ADMIN — ZOLPIDEM TARTRATE 5 MILLIGRAM(S): 10 TABLET ORAL at 22:49

## 2020-03-03 RX ADMIN — Medication 650 MILLIGRAM(S): at 13:00

## 2020-03-03 RX ADMIN — SENNA PLUS 2 TABLET(S): 8.6 TABLET ORAL at 06:36

## 2020-03-03 RX ADMIN — TAMSULOSIN HYDROCHLORIDE 0.4 MILLIGRAM(S): 0.4 CAPSULE ORAL at 16:28

## 2020-03-03 RX ADMIN — POLYETHYLENE GLYCOL 3350 17 GRAM(S): 17 POWDER, FOR SOLUTION ORAL at 03:59

## 2020-03-03 RX ADMIN — Medication 2 SPRAY(S): at 22:49

## 2020-03-03 RX ADMIN — SIMVASTATIN 30 MILLIGRAM(S): 20 TABLET, FILM COATED ORAL at 21:36

## 2020-03-03 RX ADMIN — Medication 650 MILLIGRAM(S): at 12:04

## 2020-03-03 RX ADMIN — Medication 50 MILLIGRAM(S): at 06:36

## 2020-03-03 RX ADMIN — LOSARTAN POTASSIUM 50 MILLIGRAM(S): 100 TABLET, FILM COATED ORAL at 06:36

## 2020-03-03 NOTE — DISCHARGE NOTE PROVIDER - NSDCMRMEDTOKEN_GEN_ALL_CORE_FT
Colace 50 mg oral capsule: 1 cap(s) orally 2 times a day   Cozaar 50 mg oral tablet: 1 tab(s) orally once a day  LORazepam 0.5 mg oral tablet: 1 tab(s) orally every 6 hours, As needed, Anxiety  simvastatin 20 mg oral tablet: 1 tab(s) orally once a day (at bedtime)  Toprol-XL 25 mg oral tablet, extended release: 1 tab(s) orally once a day Colace 50 mg oral capsule: 1 cap(s) orally 2 times a day   Cozaar 50 mg oral tablet: 1 tab(s) orally once a day  Flomax 0.4 mg oral capsule: 1 cap(s) orally once a day MDD:1  LORazepam 0.5 mg oral tablet: 1 tab(s) orally every 6 hours, As needed, Anxiety  simvastatin 20 mg oral tablet: 1 tab(s) orally once a day (at bedtime)  Toprol-XL 25 mg oral tablet, extended release: 1 tab(s) orally once a day

## 2020-03-03 NOTE — PROGRESS NOTE ADULT - SUBJECTIVE AND OBJECTIVE BOX
NP Note Neuroendovascualr Dr Jorgensen sign off note    HPI:    OVERNIGHT EVENTS:  Vital Signs Last 24 Hrs  T(C): 36.6 (03 Mar 2020 07:55), Max: 36.8 (02 Mar 2020 22:12)  T(F): 97.9 (03 Mar 2020 07:55), Max: 98.2 (02 Mar 2020 22:12)  HR: 80 (03 Mar 2020 07:55) (70 - 86)  BP: 145/93 (03 Mar 2020 07:55) (111/63 - 153/86)  BP(mean): 114 (03 Mar 2020 07:55) (81 - 114)  RR: 16 (03 Mar 2020 07:55) (16 - 16)  SpO2: 96% (03 Mar 2020 07:55) (92% - 96%)    I&O's Summary    02 Mar 2020 07:01  -  03 Mar 2020 07:00  --------------------------------------------------------  IN: 0 mL / OUT: 625 mL / NET: -625 mL    POD# 0 S/P cerebral angiogram for embolization of right sphenopallatine and right infraorbital arter  POD #1 No epistasis   COntinue to monitor closely        Exam:  Gen: laying in hospital bed, NAD  Neuro: AA+Ox3, OE to verbal stimulus, FC  CN II-XII: PERRL, EOMI  Motor: MAEx4, good strength throughout  SILT in UE and LE b/l  Groin site C/D/I  DP 2+ b/l      Assessment:   73yo Male with PMHx recurrent AVM, left nare epistaxis, now s/p cerebral angiogram for embolization of right sphenopallatine and right infraorbital artery POD #1    Plan:  - neuro checks  - vitals checks  - pain control  -Advance diet-Bowel regime  - medical care as per primary team        DIET: Regular    LABS:                        12.7   10.31 )-----------( 187      ( 03 Mar 2020 05:45 )             37.7     03-03    135  |  101  |  21  ----------------------------<  110<H>  4.2   |  23  |  1.06    Ca    8.8      03 Mar 2020 05:45  Phos  3.0     03-  Mg     1.9     -    TPro  7.5  /  Alb  4.4  /  TBili  1.2  /  DBili  x   /  AST  29  /  ALT  17  /  AlkPhos  71      PT/INR - ( 01 Mar 2020 21:35 )   PT: 12.5 sec;   INR: 1.09          PTT - ( 01 Mar 2020 21:35 )  PTT:31.7 sec  Urinalysis Basic - ( 03 Mar 2020 09:29 )    Color: Yellow / Appearance: Clear / S.010 / pH: x  Gluc: x / Ketone: NEGATIVE  / Bili: Negative / Urobili: 0.2 E.U./dL   Blood: x / Protein: NEGATIVE mg/dL / Nitrite: NEGATIVE   Leuk Esterase: NEGATIVE / RBC: x / WBC x   Sq Epi: x / Non Sq Epi: x / Bacteria: x    FALL RISK:  [] Low Risk                                    [] Impulsive  Assessment:  Present when checked    []  GCS  E   V  M     Heart Failure: []Acute, [] acute on chronic , []chronic  Heart Failure:  [] Diastolic (HFpEF), [] Systolic (HFrEF), []Combined (HFpEF and HFrEF), [] RHF, [] Pulm HTN, [] Other    [] DAVID, [] ATN, [] AIN, [] other  [] CKD1, [] CKD2, [] CKD 3, [] CKD 4, [] CKD 5, []ESRD    Encephalopathy: [] Metabolic, [] Hepatic, [] toxic, [] Neurological, [] Other    Abnormal Nurtitional Status: [] malnurtition (see nutrition note), [ ]underweight: BMI < 19, [] morbid obesity: BMI >40, [] Cachexia    [] Sepsis  [] hypovolemic shock,[] cardiogenic shock, [] hemorrhagic shock, [] neuogenic shock  [] Acute Respiratory Failure  []Cerebral edema, [] Brain compression/ herniation,   [] Functional quadriplegia  [] Acute blood loss anemia

## 2020-03-03 NOTE — DISCHARGE NOTE PROVIDER - HOSPITAL COURSE
HPI:     74M know to ENT and neuroIR services. Pt with hx of congenital nasal AVMs with recurrent epistaxis, s/p multiple IR embolizations. Pt presented to [facility] ED due to persistent bleeding from left nares not controlled with decongestant and pressure at home. Pt initially planned for ambulatory IR embolization later this week, but due to persistent bleeding not controlled at home, pt will be admitted with plan for embolization, likely tomorrow 3/02/2020 with Dr. Jorgensen. Pt's left nares packed by ED with rhino rocket, subsequent control of hemostaxis, pre-operative labs drawn while pt in ED. Pt denies any dizziness, SOB, palpitations concerning for symptomatic anemia. Pt denies CP, SOB, n/v, subjective fevers/chills.         Interval:    3/3: patient underwent embolization of right sphenopallatine and right infraorbital artery yesterday with neuroIR, procedure uncomplicated, nasal packing removed no subsequent epistaxis. Patient complaining of discomfort on urination and constipation since the procedure, now resolved. UA negative. Safe for discharge today HPI:     74M know to ENT and neuroIR services. Pt with hx of congenital nasal AVMs with recurrent epistaxis, s/p multiple IR embolizations. Pt presented to [facility] ED due to persistent bleeding from left nares not controlled with decongestant and pressure at home. Pt initially planned for ambulatory IR embolization later this week, but due to persistent bleeding not controlled at home, pt will be admitted with plan for embolization, likely tomorrow 3/02/2020 with Dr. Jorgensen. Pt's left nares packed by ED with rhino rocket, subsequent control of hemostaxis, pre-operative labs drawn while pt in ED. Pt denies any dizziness, SOB, palpitations concerning for symptomatic anemia. Pt denies CP, SOB, n/v, subjective fevers/chills.         Interval:    3/3: patient underwent embolization of right sphenopallatine and right infraorbital artery yesterday with neuroIR, procedure uncomplicated, nasal packing removed no subsequent epistaxis. Patient complaining of discomfort on urination and constipation since the procedure. Constipation resolved following miralax + enema. Found to be retaining urine,  consulted, plan for home with leg bag. HPI:     74M know to ENT and neuroIR services. Pt with hx of congenital nasal AVMs with recurrent epistaxis, s/p multiple IR embolizations. Pt presented to [facility] ED due to persistent bleeding from left nares not controlled with decongestant and pressure at home. Pt initially planned for ambulatory IR embolization later this week, but due to persistent bleeding not controlled at home, pt will be admitted with plan for embolization, likely tomorrow 3/02/2020 with Dr. Jorgensen. Pt's left nares packed by ED with rhino rocket, subsequent control of hemostaxis, pre-operative labs drawn while pt in ED. Pt denies any dizziness, SOB, palpitations concerning for symptomatic anemia. Pt denies CP, SOB, n/v, subjective fevers/chills.         Interval:    3/3: patient underwent embolization of right sphenopallatine and right infraorbital artery yesterday with neuroIR, procedure uncomplicated, nasal packing removed no subsequent epistaxis. Patient complaining of discomfort on urination and constipation since the procedure. Constipation resolved following miralax + enema. Found to be retaining urine,  consulted, placed leg bag, safe for dc home HPI:     74M know to ENT and neuroIR services. Pt with hx of congenital nasal AVMs with recurrent epistaxis, s/p multiple IR embolizations. Pt presented to [facility] ED due to persistent bleeding from left nares not controlled with decongestant and pressure at home. Pt initially planned for ambulatory IR embolization later this week, but due to persistent bleeding not controlled at home, pt will be admitted with plan for embolization, likely tomorrow 3/02/2020 with Dr. Jorgensen. Pt's left nares packed by ED with rhino rocket, subsequent control of hemostaxis, pre-operative labs drawn while pt in ED. Pt denies any dizziness, SOB, palpitations concerning for symptomatic anemia. Pt denies CP, SOB, n/v, subjective fevers/chills.         Interval:    3/3: patient underwent embolization of right sphenopallatine and right infraorbital artery yesterday with neuroIR, procedure uncomplicated, nasal packing removed no subsequent epistaxis. Patient complaining of discomfort on urination and constipation since the procedure. Constipation resolved following miralax + enema. Found to be retaining urine,  consulted, placed britt to leg bag, safe for dc home

## 2020-03-03 NOTE — DISCHARGE NOTE PROVIDER - NSDCFUADDINST_GEN_ALL_CORE_FT
No heavy lifting or straining until you see Dr. Louis in the office No heavy lifting or straining until you see Dr. Louis in the office. Take tylenol over the counter for pain. Resume your home medications as prescribed. Call the office of Dr. Louis [phone]) if you experience severe bleeding from the nose Use flomax daily at bedtime as written. Apply lidocaine jelly to urethra meatus as needed for pain relief. No heavy lifting or straining until you see Dr. Louis in the office. Take tylenol over the counter for pain. Resume your home medications as prescribed. Call the office of Dr. Louis [phone]) if you experience severe bleeding from the nose

## 2020-03-03 NOTE — DISCHARGE NOTE PROVIDER - NSDCFUSCHEDAPPT_GEN_ALL_CORE_FT
FIORELLA DEL TORO ; 03/04/2020 ; [facility]mits  FIORELLA DEL TORO ; 03/18/2020 ; [facility] [address] [place] [address] FIORELLA DEL TORO ; 03/04/2020 ; [facility]mits  FIORELLA DEL TORO ; 03/18/2020 ; [facility] [address] [place] [address] FIORELLA DEL TORO ; 03/04/2020 ; [facility]  FIORELLA DEL TORO ; 03/18/2020 ; [facility] [address] [place] [address] FIORELLA DEL TORO ; 03/04/2020 ; [facility]mits  FIORELLA DEL TORO ; 03/18/2020 ; [facility] [address] [place] [address] FIORELLA DEL TORO ; 03/04/2020 ; [facility] PreAdmits  FIORELLA DEL TORO ; 03/18/2020 ; [facility] [address] [place] [address] FIORELLA DEL TORO ; 03/04/2020 ; [facility] PreAdmits  FIORELLA DEL TORO ; 03/18/2020 ; [facility] [address] [place] [address] FIORELLA DEL TORO ; 03/18/2020 ; [facility] [address] [place] [address]

## 2020-03-03 NOTE — DISCHARGE NOTE PROVIDER - CARE PROVIDERS DIRECT ADDRESSES
,[email or web address]

## 2020-03-03 NOTE — DISCHARGE NOTE PROVIDER - NSDCFUADDAPPT_GEN_ALL_CORE_FT
Call the office of Dr. Louis [phone] to schedule a follow up appointment   Call the office of Dr. Jorgensen [phone] to schedule a follow up appointment Call the office of Dr. Louis [phone] to schedule a follow up appointment   Call the office of Dr. Jorgensen [phone] to schedule a follow up appointment  Call the office of Dr. Chandler [phone] to schedule a follow up appointment Call the office of Dr. Louis [phone] to schedule a follow up appointment   Call the office of Dr. Jorgensen [phone] to schedule a follow up appointment  Follow up with Urology clinic at [address] Friday 3/6 [phone]

## 2020-03-03 NOTE — DISCHARGE NOTE PROVIDER - CARE PROVIDER_API CALL
Jonny Louis)  Otolaryngology  [address] [place] [address], [address]  [place], [place] [address]  Phone: [phone]  Fax: [phone]  Follow Up Time: Jonny Louis)  Otolaryngology  [address], [address]nd [place] [address]  Phone: [phone]  Fax: [phone]  Follow Up Time:     Reynaldo Jorgensen)  Neurology; Vascular Neurology  [address], [address] [place] [address]  Phone: [phone]  Fax: [phone]  Follow Up Time:

## 2020-03-03 NOTE — CONSULT NOTE ADULT - SUBJECTIVE AND OBJECTIVE BOX
CONSULT NOTE:    HPI:  73 yo m admitted to ENT with epissaxis s/p embolization. Called to see patient for urinary retention. Per patient he has a urologist, but has not seen him in quite some time. He denies any history of BPH but states he had prostatitis once many years ago that did not require hospitalization. He believes he empties his bladder but states has to get up 3 times a night on average to urinate. Denies any urgency, frequency, dyusria or hematuria at this time.  ZDenies constipation and states he is ambulating.     Vital Signs Last 24 Hrs  T(C): 36.6 (03 Mar 2020 07:55), Max: 36.8 (02 Mar 2020 22:12)  T(F): 97.9 (03 Mar 2020 07:55), Max: 98.2 (02 Mar 2020 22:12)  HR: 70 (03 Mar 2020 11:54) (70 - 86)  BP: 149/86 (03 Mar 2020 11:54) (111/63 - 153/86)  BP(mean): 112 (03 Mar 2020 11:54) (81 - 114)  RR: 16 (03 Mar 2020 07:55) (16 - 16)  SpO2: 95% (03 Mar 2020 11:54) (92% - 96%)  I&O's Summary    02 Mar 2020 07:01  -  03 Mar 2020 07:00  --------------------------------------------------------  IN: 0 mL / OUT: 625 mL / NET: -625 mL    03 Mar 2020 07:01  -  03 Mar 2020 14:05  --------------------------------------------------------  IN: 480 mL / OUT: 75 mL / NET: 405 mL        PE:  Gen: nad  Abd: soft, nd, nt  : circumcised phallus non TTP, + mild bleeding at meatus likley secondary to straight cath attempt by nursing staff      LABS:                        12.7   10.31 )-----------( 187      ( 03 Mar 2020 05:45 )             37.7     03-03    135  |  101  |  21  ----------------------------<  110<H>  4.2   |  23  |  1.06    Ca    8.8      03 Mar 2020 05:45  Phos  3.0     03-03  Mg     1.9     03-03    TPro  7.5  /  Alb  4.4  /  TBili  1.2  /  DBili  x   /  AST  29  /  ALT  17  /  AlkPhos  71  03-01    PT/INR - ( 01 Mar 2020 21:35 )   PT: 12.5 sec;   INR: 1.09          PTT - ( 01 Mar 2020 21:35 )  PTT:31.7 sec  Cultures      A/P 73 yo m with post op retention bedside pvr 690cc  1) 18 f coude placed without difficulty drained 750cc clear urine  2) start flomax  3) can use lidocine jelly prn at meatus  4) f/u with Dr reeves as outpatient for TOV  CONSULT NOTE:    HPI:  75 yo m admitted to ENT with epissaxis s/p embolization. Called to see patient for urinary retention. Per patient he has a urologist, but has not seen him in quite some time. He denies any history of BPH but states he had prostatitis once many years ago that did not require hospitalization. He believes he empties his bladder but states has to get up 3 times a night on average to urinate. Denies any urgency, frequency, dyusria or hematuria at this time.  ZDenies constipation and states he is ambulating.     Vital Signs Last 24 Hrs  T(C): 36.6 (03 Mar 2020 07:55), Max: 36.8 (02 Mar 2020 22:12)  T(F): 97.9 (03 Mar 2020 07:55), Max: 98.2 (02 Mar 2020 22:12)  HR: 70 (03 Mar 2020 11:54) (70 - 86)  BP: 149/86 (03 Mar 2020 11:54) (111/63 - 153/86)  BP(mean): 112 (03 Mar 2020 11:54) (81 - 114)  RR: 16 (03 Mar 2020 07:55) (16 - 16)  SpO2: 95% (03 Mar 2020 11:54) (92% - 96%)  I&O's Summary    02 Mar 2020 07:01  -  03 Mar 2020 07:00  --------------------------------------------------------  IN: 0 mL / OUT: 625 mL / NET: -625 mL    03 Mar 2020 07:01  -  03 Mar 2020 14:05  --------------------------------------------------------  IN: 480 mL / OUT: 75 mL / NET: 405 mL        PE:  Gen: nad  Abd: soft, nd, nt  : circumcised phallus non TTP, + mild bleeding at meatus likley secondary to straight cath attempt by nursing staff      LABS:                        12.7   10.31 )-----------( 187      ( 03 Mar 2020 05:45 )             37.7     03-03    135  |  101  |  21  ----------------------------<  110<H>  4.2   |  23  |  1.06    Ca    8.8      03 Mar 2020 05:45  Phos  3.0     03-03  Mg     1.9     03-03    TPro  7.5  /  Alb  4.4  /  TBili  1.2  /  DBili  x   /  AST  29  /  ALT  17  /  AlkPhos  71  03-01    PT/INR - ( 01 Mar 2020 21:35 )   PT: 12.5 sec;   INR: 1.09          PTT - ( 01 Mar 2020 21:35 )  PTT:31.7 sec  Cultures      A/P 75 yo m with post op retention bedside pvr 690cc  1) 18 f coude placed without difficulty drained 750cc clear urine  2) start flomax  3) can use lidocine jelly prn at meatus  4) f/u with Dr reeves as outpatient for TOV    ADDENDUM:  -Patient can follow up at urology clinic at [place] [address] [place] [phone]

## 2020-03-03 NOTE — DISCHARGE NOTE PROVIDER - PROVIDER TOKENS
PROVIDER:[TOKEN:[[id number]]] PROVIDER:[TOKEN:[[id number]]],PROVIDER:[TOKEN:[[id number]]]

## 2020-03-03 NOTE — PROGRESS NOTE ADULT - SUBJECTIVE AND OBJECTIVE BOX
HPI:   74M know to ENT and neuroIR services. Pt with hx of congenital nasal AVMs with recurrent epistaxis, s/p multiple IR embolizations. Pt presented to [facility] ED due to persistent bleeding from left nares not controlled with decongestant and pressure at home. Pt initially planned for ambulatory IR embolization later this week, but due to persistent bleeding not controlled at home, pt will be admitted with plan for embolization, likely tomorrow 3/02/2020 with Dr. Jorgensen. Pt's left nares packed by ED with rhino rocket, subsequent control of hemostaxis, pre-operative labs drawn while pt in ED. Pt denies any dizziness, SOB, palpitations concerning for symptomatic anemia. Pt denies CP, SOB, n/v, subjective fevers/chills.     Interval:  3/3: patient underwent embolization of right sphenopallatine and right infraorbital artery yesterday with neuroIR, procedure uncomplicated, nasal packing removed, but patient complaining of discomfort on urination and constipation since the procedure. Patient did not receive a britt during procedure. Will draw UA and address constipation.     PAST MEDICAL & SURGICAL HISTORY:  Epistaxis, recurrent  Hyperlipidemia, unspecified hyperlipidemia type  Hypertension, unspecified type    Allergies    Vibramycin (Hives)    Intolerances      MEDICATIONS  (STANDING):  losartan 50 milliGRAM(s) Oral daily  metoprolol succinate ER 50 milliGRAM(s) Oral daily  polyethylene glycol 3350 17 Gram(s) Oral at bedtime  simvastatin 30 milliGRAM(s) Oral at bedtime    MEDICATIONS  (PRN):  acetaminophen   Tablet .. 650 milliGRAM(s) Oral every 6 hours PRN Temp greater or equal to 38.5C (101.3F), Mild Pain (1 - 3)  morphine  - Injectable 2 milliGRAM(s) IV Push every 4 hours PRN Severe Pain (7 - 10)  ondansetron Injectable 4 milliGRAM(s) IV Push every 8 hours PRN Nausea and/or Vomiting  oxyCODONE    IR 5 milliGRAM(s) Oral every 6 hours PRN Moderate Pain (4 - 6)        Vital Signs Last 24 Hrs  T(C): 36.8 (03 Mar 2020 05:47), Max: 36.8 (02 Mar 2020 22:12)  T(F): 98.2 (03 Mar 2020 05:47), Max: 98.2 (02 Mar 2020 22:12)  HR: 70 (03 Mar 2020 00:15) (66 - 86)  BP: 139/81 (03 Mar 2020 00:15) (111/63 - 158/81)  BP(mean): 104 (03 Mar 2020 00:15) (81 - 113)  RR: 16 (03 Mar 2020 00:15) (16 - 17)  SpO2: 92% (03 Mar 2020 00:15) (92% - 96%)    Physical Exam:  Alert and oriented, NAD   Nonlabored breathing on RA   No blood in oropharynx, no bleeding from nose     03-02-20 @ 07:01  -  03-03-20 @ 07:00  --------------------------------------------------------  IN: 0 mL / OUT: 625 mL / NET: -625 mL                              12.7   10.31 )-----------( 187      ( 03 Mar 2020 05:45 )             37.7    03-03    135  |  101  |  21  ----------------------------<  110<H>  4.2   |  23  |  1.06    Ca    8.8      03 Mar 2020 05:45  Phos  3.0     03-03  Mg     1.9     03-03    TPro  7.5  /  Alb  4.4  /  TBili  1.2  /  DBili  x   /  AST  29  /  ALT  17  /  AlkPhos  71  03-01   PT/INR - ( 01 Mar 2020 21:35 )   PT: 12.5 sec;   INR: 1.09          PTT - ( 01 Mar 2020 21:35 )  PTT:31.7 sec      A/P: 74M with congential nasal AVMs with recurrent epistaxis, hemostatic with pack in left nares  - observe to ensure no further bleeding   - fu UA   - fu BM  - continue home meds

## 2020-03-04 ENCOUNTER — TRANSCRIPTION ENCOUNTER (OUTPATIENT)
Age: 75
End: 2020-03-04

## 2020-03-04 VITALS — HEART RATE: 80 BPM | SYSTOLIC BLOOD PRESSURE: 117 MMHG | DIASTOLIC BLOOD PRESSURE: 64 MMHG | RESPIRATION RATE: 16 BRPM

## 2020-03-04 LAB
CULTURE RESULTS: NO GROWTH — SIGNIFICANT CHANGE UP
SPECIMEN SOURCE: SIGNIFICANT CHANGE UP

## 2020-03-04 RX ORDER — TAMSULOSIN HYDROCHLORIDE 0.4 MG/1
0.4 CAPSULE ORAL AT BEDTIME
Refills: 0 | Status: DISCONTINUED | OUTPATIENT
Start: 2020-03-04 | End: 2020-03-04

## 2020-03-04 RX ADMIN — HEPARIN SODIUM 5000 UNIT(S): 5000 INJECTION INTRAVENOUS; SUBCUTANEOUS at 06:55

## 2020-03-04 RX ADMIN — LOSARTAN POTASSIUM 50 MILLIGRAM(S): 100 TABLET, FILM COATED ORAL at 06:55

## 2020-03-04 RX ADMIN — Medication 50 MILLIGRAM(S): at 06:55

## 2020-03-04 NOTE — PROGRESS NOTE ADULT - SUBJECTIVE AND OBJECTIVE BOX
HPI:   74M know to ENT and neuroIR services. Pt with hx of congenital nasal AVMs with recurrent epistaxis, s/p multiple IR embolizations. Pt presented to [facility] ED due to persistent bleeding from left nares not controlled with decongestant and pressure at home. Pt initially planned for ambulatory IR embolization later this week, but due to persistent bleeding not controlled at home, pt will be admitted with plan for embolization, likely tomorrow 3/02/2020 with Dr. Jorgensen. Pt's left nares packed by ED with rhino rocket, subsequent control of hemostaxis, pre-operative labs drawn while pt in ED. Pt denies any dizziness, SOB, palpitations concerning for symptomatic anemia. Pt denies CP, SOB, n/v, subjective fevers/chills.     Interval:  3/3: patient underwent embolization of right sphenopallatine and right infraorbital artery yesterday with neuroIR, procedure uncomplicated, nasal packing removed, but patient complaining of discomfort on urination and constipation since the procedure. Patient did not receive a britt during procedure. Will draw UA and address constipation.   3/4: Yesterday had urethral discomfort and frequent small urinations. was found to be in urinary retention on bladder scan. Britt to legbag placed by  team yesterday. Plan for DC with outpatient  followup.     PAST MEDICAL & SURGICAL HISTORY:  Epistaxis, recurrent  Hyperlipidemia, unspecified hyperlipidemia type  Hypertension, unspecified type    Allergies    Vibramycin (Hives)    Intolerances    MEDICATIONS  (STANDING):  heparin  Injectable 5000 Unit(s) SubCutaneous every 8 hours  losartan 50 milliGRAM(s) Oral daily  metoprolol succinate ER 50 milliGRAM(s) Oral daily  simvastatin 30 milliGRAM(s) Oral at bedtime  tamsulosin 0.4 milliGRAM(s) Oral at bedtime    MEDICATIONS  (PRN):  acetaminophen   Tablet .. 650 milliGRAM(s) Oral every 6 hours PRN Temp greater or equal to 38.5C (101.3F), Mild Pain (1 - 3)  lidocaine 2% Gel 1 Application(s) Topical every 3 hours PRN for mild pain  morphine  - Injectable 2 milliGRAM(s) IV Push every 4 hours PRN Severe Pain (7 - 10)  ondansetron Injectable 4 milliGRAM(s) IV Push every 8 hours PRN Nausea and/or Vomiting  oxyCODONE    IR 5 milliGRAM(s) Oral every 6 hours PRN Moderate Pain (4 - 6)  polyethylene glycol 3350 17 Gram(s) Oral two times a day PRN Constipation  sodium chloride 0.65% Nasal 2 Spray(s) Both Nostrils every 2 hours PRN Nasal Congestion  zolpidem 5 milliGRAM(s) Oral at bedtime PRN Insomnia      Vital Signs Last 24 Hrs  T(C): 36.5 (04 Mar 2020 09:17), Max: 36.8 (03 Mar 2020 14:18)  T(F): 97.7 (04 Mar 2020 09:17), Max: 98.2 (03 Mar 2020 14:18)  HR: 80 (04 Mar 2020 09:25) (66 - 82)  BP: 117/64 (04 Mar 2020 09:25) (114/62 - 158/83)  BP(mean): 83 (04 Mar 2020 09:25) (81 - 113)  RR: 16 (04 Mar 2020 09:25) (16 - 18)  SpO2: 94% (04 Mar 2020 04:15) (94% - 96%)    Physical Exam:  Alert and oriented, NAD   Nonlabored breathing on RA   No blood in oropharynx, no bleeding from nose       A/P: 74M with congential nasal AVMs with recurrent epistaxis, hemostatic with pack in left nares. Found to have post procedure urinary retention   - observe to ensure no further bleeding   -britt care per  recs. Will f/u with  at [facility] this Friday  -plan for DC today

## 2020-03-04 NOTE — DISCHARGE NOTE NURSING/CASE MANAGEMENT/SOCIAL WORK - PATIENT PORTAL LINK FT
You can access the FollowMyHealth Patient Portal offered by [facility] by registering at the following website: [email or web address]. By joining [facility]’s FollowMyHealth portal, you will also be able to view your health information using other applications (apps) compatible with our system.

## 2020-03-04 NOTE — DISCHARGE NOTE NURSING/CASE MANAGEMENT/SOCIAL WORK - NSDCFUADDAPPT_GEN_ALL_CORE_FT
Call the office of Dr. Louis [phone] to schedule a follow up appointment   Call the office of Dr. Jorgensen [phone] to schedule a follow up appointment  Follow up with Urology clinic at [address] Friday 3/6 [phone]

## 2020-03-06 ENCOUNTER — OUTPATIENT (OUTPATIENT)
Dept: OUTPATIENT SERVICES | Facility: HOSPITAL | Age: 75
LOS: 1 days | End: 2020-03-06

## 2020-03-06 ENCOUNTER — APPOINTMENT (OUTPATIENT)
Dept: UROLOGY | Facility: CLINIC | Age: 75
End: 2020-03-06

## 2020-03-06 VITALS
TEMPERATURE: 99.2 F | DIASTOLIC BLOOD PRESSURE: 85 MMHG | RESPIRATION RATE: 14 BRPM | SYSTOLIC BLOOD PRESSURE: 140 MMHG | HEART RATE: 83 BPM

## 2020-03-06 DIAGNOSIS — R33.9 RETENTION OF URINE, UNSPECIFIED: ICD-10-CM

## 2020-03-08 NOTE — REVIEW OF SYSTEMS
[Nocturia] : nocturia [Fever] : no fever [Chills] : no chills [Chest Pain] : no chest pain [Shortness Of Breath] : no shortness of breath [Abdominal Pain] : no abdominal pain [Vomiting] : no vomiting [Constipation] : no constipation [Diarrhea] : no diarrhea [Dysuria] : no dysuria [Hesitancy] : no urinary hesitancy [Dizziness] : no dizziness [Erectile Dysfunction] : no erectile dysfunction

## 2020-03-08 NOTE — HISTORY OF PRESENT ILLNESS
[Urinary Retention] : urinary retention [Nocturia] : nocturia [None] : None [FreeTextEntry1] : 74M recently admitted for embolization of nasal AVM presents to clinic for TOV. Patient went into urinary retention during hospital stay. Follows with outside urologist who states prostate is 38cc. On flomax since discharge. No hematuria, f/c. Nocturia x1 prior to catheterization. No voiding complaints. Having more regular BMs since hospitalization. PSA 3.2 12/19. GEOFFREY negative 9/19. [Urinary Incontinence] : no urinary incontinence [Urinary Urgency] : no urinary urgency [Urinary Frequency] : no urinary frequency [Post-Void Dribbling] : no post-void dribbling [Dysuria] : no dysuria [Hematuria - Gross] : no gross hematuria [Abdominal Pain] : no abdominal pain [Flank Pain] : no flank pain [Fever] : no fever [Nausea] : no nausea

## 2020-03-08 NOTE — PHYSICAL EXAM
[General Appearance - Well Developed] : well developed [General Appearance - Well Nourished] : well nourished [Normal Appearance] : normal appearance [Well Groomed] : well groomed [General Appearance - In No Acute Distress] : no acute distress [Edema] : no peripheral edema [Abdomen Soft] : soft [Abdomen Tenderness] : non-tender [Costovertebral Angle Tenderness] : no ~M costovertebral angle tenderness [Urethral Meatus] : meatus normal [Penis Abnormality] : normal circumcised penis [Scrotum] : the scrotum was normal [Normal Station and Gait] : the gait and station were normal for the patient's age [] : no rash [No Focal Deficits] : no focal deficits [Oriented To Time, Place, And Person] : oriented to person, place, and time [No Palpable Adenopathy] : no palpable adenopathy [FreeTextEntry1] : GEOFFREY deferred

## 2020-03-08 NOTE — ASSESSMENT
[FreeTextEntry1] : 74M recently admitted for embolization of nasal AVM presents to clinic for TOV \par \par -Active TOV with 180cc instilled, voided 150cc with PVR 110cc, comfortable\par -Will followup with Dr. Chandler for urologic followup

## 2020-03-11 DIAGNOSIS — R33.9 RETENTION OF URINE, UNSPECIFIED: ICD-10-CM

## 2020-03-12 ENCOUNTER — APPOINTMENT (OUTPATIENT)
Dept: OTOLARYNGOLOGY | Facility: CLINIC | Age: 75
End: 2020-03-12
Payer: MEDICARE

## 2020-03-12 VITALS
TEMPERATURE: 97.5 F | DIASTOLIC BLOOD PRESSURE: 73 MMHG | HEIGHT: 70 IN | WEIGHT: 195.2 LBS | BODY MASS INDEX: 27.94 KG/M2 | HEART RATE: 82 BPM | SYSTOLIC BLOOD PRESSURE: 143 MMHG

## 2020-03-12 PROCEDURE — 99213 OFFICE O/P EST LOW 20 MIN: CPT | Mod: 25

## 2020-03-12 PROCEDURE — 31231 NASAL ENDOSCOPY DX: CPT

## 2020-03-12 NOTE — CONSULT LETTER
[Dear  ___] : Dear  [unfilled], [Courtesy Letter:] : I had the pleasure of seeing your patient, [unfilled], in my office today. [Consult Closing:] : Thank you very much for allowing me to participate in the care of this patient.  If you have any questions, please do not hesitate to contact me. [Sincerely,] : Sincerely, [DrErnst  ___] : Dr. WYATT [Jonny Louis MD] : Jonny Louis MD  [Department of Otolaryngology, Head and Neck Surgery] : Department of Otolaryngology, Head and Neck Surgery [[facility]] : [facility]

## 2020-03-12 NOTE — PROCEDURE
[FreeTextEntry3] : Nasal Endoscopy:\par small anterosuperior septal perf, stable\par R NC: mild crusting and debris removed. nasal airway widely patent, middle meatus patent w stranding old coagulum from behind the uncinate, no mucopus or coagulum\par L NC: crusting and mucus removed. synechiae between IT and septum, between superior lateral wall and septum and between MT and septum, all nonobstructive. nasal airway widely patent, middle meatus grossly patent, no mucopus, no clot or coagulum\par choana w mild clear mucus removed

## 2020-03-12 NOTE — PHYSICAL EXAM
[de-identified] : ME clear no effusion b/l [Nasal Endoscopy Performed] : nasal endoscopy was performed, see procedure section for findings [Midline] : trachea located in midline position [Normal] : no rashes

## 2020-03-12 NOTE — ASSESSMENT
[FreeTextEntry1] : 74M here in followup. 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 Most recently underwent angio/embolization of right infraorbital and sphenopalatine artery 3/2/20 in setting of new onset right sided epistaxis. He is doing well since the procedure with no further bleeding; he feels congested since the packing was placed and removed. He has undergone multiple angio/embo procedures in the past, prior to this was 1/2018. He is using the saline irrigations and has otherwise no other complaints.\par Nasal endoscopy today shows well healed, mucosalized nasal airways which are widely patent with mild crusting and debris which was removed and old coagulum standing from behind the right uncinate. He felt much better after debridement.\par He is doing well. Continue saline throughout the day. RTO 4-6 months, or sooner should bleeding restart. I again discussed the natural clinical course of his disease and likelihood of future angio/embolizations.

## 2020-03-12 NOTE — HISTORY OF PRESENT ILLNESS
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 Most recently underwent angio/embolization of right infraorbital and sphenopalatine artery 3/2/20 in setting of new onset right sided epistaxis.\par He is doing well since the procedure with no further bleeding; he feels congested since the packing was placed and removed.\par \par He has undergone multiple angio/embo procedures in the past, prior to this was 1/2018.\par He is using the saline irrigations and has otherwise no other complaints.\par \par From prior notes: \par Frequent epistaxis in 1990s s/p multiple in-office cauterizations resulting in some nasal deformity for which he underwent septal grafting? at [facility] in 2001 w no further bleeding. In 2008 was severe epistaxis and underwent angio/embolization in [facility]. He did will until 10/2017 was admitted for persistent right epistaxis and is s/p IR angio/embolization of right sphenopalatine artery and right infraorbital artery AVM. \par MRI face 11/2017: No mass lesion or regional contrast enhancement to imply hyperemia or large venous shunting\par \par Admitted to [facility] 1/27/18 as an international transfer in the setting of bilateral posterior packing for 5 days, altered mental status and fever. See prior notes for full details. Taken to OR whereupon nasal cavities were washed out and cultures taken. He was admitted for IV abx and improved progressively. He was taken to IR and underwent angio/embo of right distal facial AVM 1/31/18 and discharged 2/2/18.\par Cx: numerous klebsiella pneumoniae\par \par ROS otherwise unremarkable.

## 2020-03-13 DIAGNOSIS — I10 ESSENTIAL (PRIMARY) HYPERTENSION: ICD-10-CM

## 2020-03-13 DIAGNOSIS — E78.5 HYPERLIPIDEMIA, UNSPECIFIED: ICD-10-CM

## 2020-03-13 DIAGNOSIS — R33.9 RETENTION OF URINE, UNSPECIFIED: ICD-10-CM

## 2020-03-13 DIAGNOSIS — Q27.39 ARTERIOVENOUS MALFORMATION, OTHER SITE: ICD-10-CM

## 2020-03-13 DIAGNOSIS — K59.00 CONSTIPATION, UNSPECIFIED: ICD-10-CM

## 2020-03-17 ENCOUNTER — APPOINTMENT (OUTPATIENT)
Dept: NEUROSURGERY | Facility: CLINIC | Age: 75
End: 2020-03-17

## 2020-03-17 ENCOUNTER — APPOINTMENT (OUTPATIENT)
Dept: NEUROSURGERY | Facility: CLINIC | Age: 75
End: 2020-03-17
Payer: MEDICARE

## 2020-03-17 DIAGNOSIS — R04.0 EPISTAXIS: ICD-10-CM

## 2020-03-17 PROCEDURE — 99448 NTRPROF PH1/NTRNET/EHR 21-30: CPT

## 2020-03-18 ENCOUNTER — APPOINTMENT (OUTPATIENT)
Dept: NEUROSURGERY | Facility: CLINIC | Age: 75
End: 2020-03-18

## 2020-03-18 PROBLEM — R04.0 EPISTAXIS: Status: ACTIVE | Noted: 2017-10-12

## 2020-03-18 RX ORDER — TAMSULOSIN HYDROCHLORIDE 0.4 MG/1
0.4 CAPSULE ORAL
Refills: 0 | Status: ACTIVE | COMMUNITY

## 2020-03-18 NOTE — REASON FOR VISIT
[Post Hospitalization] : a post hospitalization visit [FreeTextEntry1] : s/p femoral craniofacial angiogram and endovascular embolization of supply from right sphenopalatine artery and the right infraorbital artery using Embospheres on March 2, 2020. \par \par Patient presented to [facility] ED on March 1 due to persistent bleeding from RIGHT nare not controlled with decongestant and pressure at home. He was initially planned for ambulatory embolization that week, but due to persistent bleeding, pt was admitted for embolization.\par \par His hospital stay was complicated by urinary retention requiring catheter placement,  consult and britt to leg bag on discharge to home. He was put on Flomax.\par \par TODAY'S PHONE CONSULTATION:\par Due to the Covid-19 pandemic, HFU visit is via phone call with Dr. Jorgensen and myself, verbally consented by the patient.\par \par Patient reports doing well with no further episodes of epistaxis. He denies angiogram site hematoma, ecchymosis, drainage or pain. No fever, chills, focal weakness. Britt catheter leg bag d/cd by Urology and patient has no urinary difficutly. He remains on Flomax.

## 2020-03-18 NOTE — HISTORY OF PRESENT ILLNESS
[FreeTextEntry1] : PMH of HLD, HTN, frequent epistaxis who has experienced multiple episodes of RIGHT side epistaxis over several decades requiring multiple cauterization and packing procedures. 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 Dr. Jorgensen performed embolization with Embopsphere and NBCA. There was a feeder to the malformation that wasn't embolized because of collateral circulation to the right orbital region. An MRI was obtained which showed small flow voids in the nasal region that may be related to the described small vessel type AV shunt.\par \par Patient developed another episode of profuse epistaxis from the RIGHT nostril again in January 2018 while performing in [place]. He underwent nasal packing with balloon and was transferred to [facility]. On arrival to [facility] there was evidence of nasal infection and he was septic. He underwent emergent removal of the packing and management for sepsis. On January 31, 2018, he underwent endovascular embolization of the small vessel type AV shunting with Embosphere and NBCA. \par \par Patient presented to [facility] ED on March 1 due to persistent bleeding from RIGHT nare not controlled with decongestant and pressure at home. He was initially planned for ambulatory embolization  on March 4, 2020  but due to persistent bleeding, pt was admitted for embolization. He underwent femoral craniofacial angiogram and endovascular embolization of supply from right sphenopalatine artery and the right infraorbital artery using Embospheres on March 2, 2020. \par \par \par Handedness: \par \par Patient Address:\par [address], [address]\[place] [address]\par \par PCP:\par Dr. Alfonso Johnson\par [address]\[place] [address]\par \par Cardiologist:\par Dr. Miles Ogden\par [phone]\par [address]\par [place]\[address]\[place] [address]\par Fax. [phone]\par [email or web address]\par \par Dr. Jonny Louis\par [address]\[place] [address] \par

## 2020-04-15 PROCEDURE — C1769: CPT

## 2020-04-15 PROCEDURE — 85730 THROMBOPLASTIN TIME PARTIAL: CPT

## 2020-04-15 PROCEDURE — C1894: CPT

## 2020-04-15 PROCEDURE — 84100 ASSAY OF PHOSPHORUS: CPT

## 2020-04-15 PROCEDURE — 81003 URINALYSIS AUTO W/O SCOPE: CPT

## 2020-04-15 PROCEDURE — 99285 EMERGENCY DEPT VISIT HI MDM: CPT | Mod: 25

## 2020-04-15 PROCEDURE — 85610 PROTHROMBIN TIME: CPT

## 2020-04-15 PROCEDURE — 85027 COMPLETE CBC AUTOMATED: CPT

## 2020-04-15 PROCEDURE — 86803 HEPATITIS C AB TEST: CPT

## 2020-04-15 PROCEDURE — 83735 ASSAY OF MAGNESIUM: CPT

## 2020-04-15 PROCEDURE — C1760: CPT

## 2020-04-15 PROCEDURE — 86850 RBC ANTIBODY SCREEN: CPT

## 2020-04-15 PROCEDURE — 85025 COMPLETE CBC W/AUTO DIFF WBC: CPT

## 2020-04-15 PROCEDURE — 36415 COLL VENOUS BLD VENIPUNCTURE: CPT

## 2020-04-15 PROCEDURE — C1889: CPT

## 2020-04-15 PROCEDURE — 87086 URINE CULTURE/COLONY COUNT: CPT

## 2020-04-15 PROCEDURE — 80053 COMPREHEN METABOLIC PANEL: CPT

## 2020-04-15 PROCEDURE — 30903 CONTROL OF NOSEBLEED: CPT | Mod: RT

## 2020-04-15 PROCEDURE — 80048 BASIC METABOLIC PNL TOTAL CA: CPT

## 2020-04-15 PROCEDURE — 86901 BLOOD TYPING SEROLOGIC RH(D): CPT

## 2020-04-15 PROCEDURE — C1887: CPT

## 2020-04-15 PROCEDURE — 93005 ELECTROCARDIOGRAM TRACING: CPT | Mod: XU

## 2020-07-01 NOTE — ED PROVIDER NOTE - INPATIENT RESIDENT/ACP NOTIFIED DATE
01-Mar-2020 22:39 Advancement-Rotation Flap Text: The defect edges were debeveled with a #15c scalpel blade.  Given the location of the defect, shape of the defect and the proximity to free margins an advancement-rotation flap was deemed most appropriate.  Using a sterile surgical marker, an appropriate flap was drawn incorporating the defect and placing the expected incisions within the relaxed skin tension lines where possible. The area thus outlined was incised deep to adipose tissue with a #15c scalpel blade.  The skin margins were undermined to an appropriate distance in all directions utilizing iris scissors.

## 2020-08-19 NOTE — ED PROVIDER NOTE - NEUROLOGICAL, MLM
Called patient to relay negative UPT, no answer and mailbox is full so unable to leave message. Will try again at later time. ./LR   Alert and oriented, no focal deficits, no motor or sensory deficits.

## 2020-12-15 NOTE — ED ADULT NURSE NOTE - CAS EDN DISCHARGE INTERVENTIONS
IV discontinued, cath removed intact Chonodrocutaneous Helical Advancement Flap Text: The defect edges were debeveled with a #15 scalpel blade.  Given the location of the defect and the proximity to free margins a chondrocutaneous helical advancement flap was deemed most appropriate.  Using a sterile surgical marker, the appropriate advancement flap was drawn incorporating the defect and placing the expected incisions within the relaxed skin tension lines where possible.    The area thus outlined was incised deep to adipose tissue with a #15 scalpel blade.  The skin margins were undermined to an appropriate distance in all directions utilizing iris scissors.

## 2021-03-02 ENCOUNTER — NON-APPOINTMENT (OUTPATIENT)
Age: 76
End: 2021-03-02

## 2022-03-19 NOTE — ED ADULT TRIAGE NOTE - AS O2 DELIVERY
PAST MEDICAL HISTORY:  Anxiety     Bipolar 1 disorder     Depression     Suicidal ideation     
room air

## 2023-03-09 NOTE — PATIENT PROFILE ADULT. - FUNCTIONAL SCREEN CURRENT LEVEL: SWALLOWING (IF SCORE 2 OR MORE FOR ANY ITEM, CONSULT REHAB SERVICES), MLM)
General


Patient Condition


Mental Status/LOC:  Same as Preop


Cardiovascular:  Satisfactory


Nausea/Vomiting:  Absent


Respiratory:  Satisfactory


Pain:  Controlled


Complications:  Absent





Post Op Complications


Complications


None





Follow Up Care/Instructions


Patient Instructions


None needed.





Anesthesia/Patient Condition


Patient Condition


Patient is doing well, no complaints, stable vital signs, no apparent adverse 

anesthesia problems.   


No complications reported per nursing.


D[place] per [facility] [place]:  Yes











CAESAR KERN CRNA            Mar 9, 2023 07:48 (0) swallows foods/liquids without difficulty

## 2023-10-31 ENCOUNTER — APPOINTMENT (OUTPATIENT)
Dept: OTOLARYNGOLOGY | Facility: CLINIC | Age: 78
End: 2023-10-31
Payer: MEDICARE

## 2023-10-31 PROCEDURE — 99213 OFFICE O/P EST LOW 20 MIN: CPT

## 2023-11-30 NOTE — ASU PATIENT PROFILE, ADULT - NSICDXPASTSURGICALHX_GEN_ALL_CORE_FT
PAST SURGICAL HISTORY:  H/O colonoscopy     H/O nose disorder     H/O shoulder surgery both shoulder

## 2023-11-30 NOTE — ASU PATIENT PROFILE, ADULT - FALL HARM RISK - HARM RISK INTERVENTIONS
Communicate Risk of Fall with Harm to all staff/Reinforce activity limits and safety measures with patient and family/Tailored Fall Risk Interventions/Visual Cue: Yellow wristband and red socks/Bed in lowest position, wheels locked, appropriate side rails in place/Call bell, personal items and telephone in reach/Instruct patient to call for assistance before getting out of bed or chair/Non-slip footwear when patient is out of bed/[place] to call system/Physically safe environment - no spills, clutter or unnecessary equipment/Purposeful Proactive Rounding/Room/bathroom lighting operational, light cord in reach Communicate Risk of Fall with Harm to all staff/Reinforce activity limits and safety measures with patient and family/Tailored Fall Risk Interventions/Visual Cue: Yellow wristband and red socks/Bed in lowest position, wheels locked, appropriate side rails in place/Call bell, personal items and telephone in reach/Instruct patient to call for assistance before getting out of bed or chair/Non-slip footwear when patient is out of bed/[place] to call system/Physically safe environment - no spills, clutter or unnecessary equipment/Purposeful Proactive Rounding/Room/bathroom lighting operational, light cord in reach Communicate Risk of Fall with Harm to all staff/Reinforce activity limits and safety measures with patient and family/Tailored Fall Risk Interventions/Visual Cue: Yellow wristband and red socks/Bed in lowest position, wheels locked, appropriate side rails in place/Call bell, personal items and telephone in reach/Instruct patient to call for assistance before getting out of bed or chair/Non-slip footwear when patient is out of bed/[place] to call system/Physically safe environment - no spills, clutter or unnecessary equipment/Purposeful Proactive Rounding/Room/bathroom lighting operational, light cord in reach

## 2023-11-30 NOTE — ASU PATIENT PROFILE, ADULT - NS PREOP UNDERSTANDS INFO
Spoke to patient to be NPO/NO solid foods after  2200 pm on Sunday night, allow to drink water or apple juice till 12MN,  dress comfortable  leave all valuable a t home,   bring Id photo and insurance cards,  escort arranged, address and telephone given to patient, ,, NOTE , patient states he Suffer urinary retention  after anesthesia ?surgery . He said needs   to use special   urinary  catheterization tube ,  , patient  very worry about this,  I patient informed  Anesthesiologist  MD will talk to you  before  surgery/yes

## 2023-11-30 NOTE — ASU PATIENT PROFILE, ADULT - NSICDXPASTMEDICALHX_GEN_ALL_CORE_FT
PAST MEDICAL HISTORY:  Epistaxis, recurrent     Hyperlipidemia, unspecified hyperlipidemia type     Hypertension, unspecified type

## 2023-12-03 ENCOUNTER — TRANSCRIPTION ENCOUNTER (OUTPATIENT)
Age: 78
End: 2023-12-03

## 2023-12-04 ENCOUNTER — OUTPATIENT (OUTPATIENT)
Dept: OUTPATIENT SERVICES | Facility: HOSPITAL | Age: 78
LOS: 1 days | Discharge: HOME | End: 2023-12-04
Payer: MEDICARE

## 2023-12-04 ENCOUNTER — TRANSCRIPTION ENCOUNTER (OUTPATIENT)
Age: 78
End: 2023-12-04

## 2023-12-04 ENCOUNTER — APPOINTMENT (OUTPATIENT)
Dept: OTOLARYNGOLOGY | Facility: HOSPITAL | Age: 78
End: 2023-12-04

## 2023-12-04 VITALS
HEART RATE: 88 BPM | OXYGEN SATURATION: 95 % | SYSTOLIC BLOOD PRESSURE: 123 MMHG | TEMPERATURE: 98 F | RESPIRATION RATE: 16 BRPM | DIASTOLIC BLOOD PRESSURE: 75 MMHG

## 2023-12-04 VITALS
OXYGEN SATURATION: 99 % | HEIGHT: 70.5 IN | TEMPERATURE: 97 F | RESPIRATION RATE: 14 BRPM | HEART RATE: 57 BPM | WEIGHT: 185.63 LBS | DIASTOLIC BLOOD PRESSURE: 76 MMHG | SYSTOLIC BLOOD PRESSURE: 130 MMHG

## 2023-12-04 DIAGNOSIS — Z86.69 PERSONAL HISTORY OF OTHER DISEASES OF THE NERVOUS SYSTEM AND SENSE ORGANS: Chronic | ICD-10-CM

## 2023-12-04 DIAGNOSIS — Z98.890 OTHER SPECIFIED POSTPROCEDURAL STATES: Chronic | ICD-10-CM

## 2023-12-04 PROCEDURE — 30915 LIGATION NASAL SINUS ARTERY: CPT | Mod: RT

## 2023-12-04 DEVICE — SURGICEL 4 X 8": Type: IMPLANTABLE DEVICE | Status: FUNCTIONAL

## 2023-12-04 DEVICE — LIGATING CLIPS SYNOVIS SUPERFINE MICROCLIP 6: Type: IMPLANTABLE DEVICE | Status: FUNCTIONAL

## 2023-12-04 DEVICE — CLIP APPLIER ETHICON LIGACLIP 9 3/8" SMALL: Type: IMPLANTABLE DEVICE | Status: FUNCTIONAL

## 2023-12-04 DEVICE — CARTRIDGE MICROCLIP 30: Type: IMPLANTABLE DEVICE | Status: FUNCTIONAL

## 2023-12-04 RX ORDER — CEPHALEXIN 500 MG
1 CAPSULE ORAL
Qty: 14 | Refills: 0
Start: 2023-12-04 | End: 2023-12-10

## 2023-12-04 RX ORDER — LOSARTAN POTASSIUM 100 MG/1
1 TABLET, FILM COATED ORAL
Qty: 0 | Refills: 0 | DISCHARGE

## 2023-12-04 RX ORDER — FENTANYL CITRATE 50 UG/ML
25 INJECTION INTRAVENOUS
Refills: 0 | Status: DISCONTINUED | OUTPATIENT
Start: 2023-12-04 | End: 2023-12-04

## 2023-12-04 RX ORDER — METOPROLOL TARTRATE 50 MG
1 TABLET ORAL
Qty: 0 | Refills: 0 | DISCHARGE

## 2023-12-04 RX ORDER — RACEPINEPHRINE HCL 2.25 %
1 SOLUTION, NON-ORAL TOPICAL ONCE
Refills: 0 | Status: DISCONTINUED | OUTPATIENT
Start: 2023-12-04 | End: 2023-12-04

## 2023-12-04 RX ORDER — ACETAMINOPHEN 500 MG
1000 TABLET ORAL ONCE
Refills: 0 | Status: COMPLETED | OUTPATIENT
Start: 2023-12-04 | End: 2023-12-04

## 2023-12-04 RX ORDER — LACOSAMIDE 50 MG/1
1 TABLET ORAL
Refills: 0 | DISCHARGE

## 2023-12-04 RX ORDER — SODIUM CHLORIDE 9 MG/ML
1000 INJECTION, SOLUTION INTRAVENOUS
Refills: 0 | Status: DISCONTINUED | OUTPATIENT
Start: 2023-12-04 | End: 2023-12-04

## 2023-12-04 RX ORDER — APREPITANT 80 MG/1
40 CAPSULE ORAL ONCE
Refills: 0 | Status: COMPLETED | OUTPATIENT
Start: 2023-12-04 | End: 2023-12-04

## 2023-12-04 RX ADMIN — APREPITANT 40 MILLIGRAM(S): 80 CAPSULE ORAL at 06:13

## 2023-12-04 RX ADMIN — Medication 1000 MILLIGRAM(S): at 12:29

## 2023-12-04 RX ADMIN — SODIUM CHLORIDE 100 MILLILITER(S): 9 INJECTION, SOLUTION INTRAVENOUS at 10:49

## 2023-12-04 RX ADMIN — Medication 1000 MILLIGRAM(S): at 06:13

## 2023-12-04 NOTE — PRE-ANESTHESIA EVALUATION ADULT - NSANTHOSAYNRD_GEN_A_CORE
No. JULIENNE screening performed.  STOP BANG Legend: 0-2 = LOW Risk; 3-4 = INTERMEDIATE Risk; 5-8 = HIGH Risk

## 2023-12-04 NOTE — ASU DISCHARGE PLAN (ADULT/PEDIATRIC) - ASU DC SPECIAL INSTRUCTIONSFT
Regular diet  Tylenol and motrin for pain  Resume home medications  No strenuous activity or heavy lifting for 2 weeks  Bacitracin ointment to incision two times a day  Keflex antibiotic - take two times a day  Follow up with Dr. Louis [phone] Regular diet  Tylenol and motrin for pain  Resume home medications  No strenuous activity or heavy lifting for 2 weeks  Bacitracin ointment to incision two times a day  Keflex antibiotic - take two times a day  Follow up with Dr. Louis [phone]

## 2023-12-04 NOTE — ASU PREOP CHECKLIST - 1.
pt has history of problems urinating postop. Has been catheterized in the past and gone home with a birtt catheter pt has history of problems urinating postop. Has been catheterized in the past and gone home with a britt catheter

## 2023-12-04 NOTE — ASU DISCHARGE PLAN (ADULT/PEDIATRIC) - NS MD DC FALL RISK RISK
For information on Fall & Injury Prevention, visit: [email or web address] OR  [email or web address] OR  https://www.cdc.gov/steadi/patient.html For information on Fall & Injury Prevention, visit: [email or web address] OR  [email or web address]-[email or web address]-[email or web address]-injury OR  https://www.cdc.gov/steadi/patient.html

## 2023-12-05 RX ORDER — METHYLPREDNISOLONE 4 MG/1
4 TABLET ORAL
Qty: 1 | Refills: 0 | Status: ACTIVE | COMMUNITY
Start: 2023-12-05 | End: 1900-01-01

## 2023-12-12 ENCOUNTER — APPOINTMENT (OUTPATIENT)
Dept: OTOLARYNGOLOGY | Facility: CLINIC | Age: 78
End: 2023-12-12
Payer: MEDICARE

## 2023-12-12 DIAGNOSIS — Q27.30 ARTERIOVENOUS MALFORMATION, SITE UNSPECIFIED: ICD-10-CM

## 2023-12-12 PROCEDURE — 31231 NASAL ENDOSCOPY DX: CPT | Mod: 58

## 2023-12-12 PROCEDURE — 31575 DIAGNOSTIC LARYNGOSCOPY: CPT | Mod: 58

## 2023-12-12 PROCEDURE — 99024 POSTOP FOLLOW-UP VISIT: CPT

## 2024-03-18 NOTE — ED ADULT NURSE NOTE - NS PRO PASSIVE SMOKE EXP
From: Adelia Munson  To: Gracie Tim MD  Sent: 3/17/2024 2:14 PM EDT  Subject: Dermatology Appt.    Dr. Tim,  I'd like to schedule an appointment to check-out some moles, etc. My last appointment had been with Melania Bullock in January of 2023.    Adelia   No

## 2024-05-17 NOTE — ED ADULT TRIAGE NOTE - NS ED NURSE BANDS TYPE
05/16/24 2119   RT Protocol   History Pulmonary Disease 0   Respiratory pattern 0   Breath sounds 0   Cough 0   Indications for Bronchodilator Therapy None   Bronchodilator Assessment Score 0        Name band;

## 2024-07-22 NOTE — DISCHARGE NOTE ADULT - REASON FOR ADMISSION
recurrent epistaxis, recurrent epistaxis, complicated by infection 66M PMHx HTN accompanied by [facility] [id number] presents to the ED requesting amlodipine 10mg. Patient states that he has not had his medication in the past two days. c/o slight headache and fogginess.

## 2025-03-25 NOTE — DISCHARGE NOTE PROVIDER - NSDCACTIVITY_GEN_ALL_CORE
I have updated chart with his new diagnosis.     
I have updated chart with his new diagnosis.      
So ellen Aldrich is doing well on his medication. It has not impacted his appetite, sleep, or growth. We talked about NOT chewing the capsules. He can open the capsule and mix the granules into yogurt or applesauce. If he is struggling after school, I have sent a short acting ritalin. He can take 5 or 10 mg as needed for after school focus. He should swallow this tablet and not chew it. Med check in 3 months or sooner if struggling.   Orders:  •  methylphenidate (RITALIN LA) 20 MG 24 hr capsule; Take 1 capsule (20 mg total) by mouth every morning Max Daily Amount: 20 mg  •  methylphenidate (RITALIN LA) 20 MG 24 hr capsule; Take 1 capsule (20 mg total) by mouth every morning Max Daily Amount: 20 mg Do not start before April 25, 2025.  •  methylphenidate (RITALIN LA) 20 MG 24 hr capsule; Take 1 capsule (20 mg total) by mouth every morning Max Daily Amount: 20 mg Do not start before May 25, 2025.  •  methylphenidate (RITALIN) 5 mg tablet; Take 5 mg by mouth after school as needed for activities  
No heavy lifting/straining

## 2025-04-11 NOTE — DISCHARGE NOTE ADULT - CARE PROVIDER_API CALL
Recommend encouraged abstaining from alcohol          Reynaldo Jorgensen), Neurology; Vascular Neurology  [address] [address]  Phone: [phone]  Fax: [phone]    Jonny Louis), Otolaryngology  [address]  [address] [place] [address]  Phone: [phone]  Fax: [phone] Reynaldo Jorgensen), Neurology; Vascular Neurology  [address] [address]  Phone: [phone]  Fax: [phone]    Jonny Louis), Otolaryngology  [address]  [address] [place] [address]  Phone: [phone]  Fax: [phone]    Bala Fitzgerald), Internal Medicine  [address]  [address] [place] [address]  Phone: [phone]  Fax: [phone]

## (undated) DEVICE — BLADE MEDTRONIC ENT QUADCUT ROTATABLE STRAIGHT 4MM X 13CM

## (undated) DEVICE — BLADE MEDTRONIC ENT RAD 60 DEGREE ROTATABLE 4MM X 11CM

## (undated) DEVICE — STAPLER SKIN VISI-STAT 35 WIDE

## (undated) DEVICE — ELCTR COLORADO 3CM

## (undated) DEVICE — MARKING PEN W RULER

## (undated) DEVICE — GLV 7 PROTEXIS (WHITE)

## (undated) DEVICE — SLV COMPRESSION KNEE MED

## (undated) DEVICE — Device

## (undated) DEVICE — SOL ANTI FOG

## (undated) DEVICE — PACK RHINOPLASTY

## (undated) DEVICE — WARMING BLANKET LOWER ADULT

## (undated) DEVICE — DRAPE MAYO STAND 23"

## (undated) DEVICE — BIPOLAR FORCEP KIRWAN JEWELERS STR 4" X 0.4MM W 12FT CORD (GREEN)

## (undated) DEVICE — DRSG TELFA 3 X 8